# Patient Record
Sex: FEMALE | Race: ASIAN | NOT HISPANIC OR LATINO
[De-identification: names, ages, dates, MRNs, and addresses within clinical notes are randomized per-mention and may not be internally consistent; named-entity substitution may affect disease eponyms.]

---

## 2017-03-09 ENCOUNTER — APPOINTMENT (OUTPATIENT)
Dept: ELECTROPHYSIOLOGY | Facility: CLINIC | Age: 82
End: 2017-03-09

## 2017-09-21 ENCOUNTER — APPOINTMENT (OUTPATIENT)
Dept: ELECTROPHYSIOLOGY | Facility: CLINIC | Age: 82
End: 2017-09-21
Payer: MEDICAID

## 2017-09-21 PROCEDURE — 93280 PM DEVICE PROGR EVAL DUAL: CPT

## 2017-09-21 RX ORDER — LEVOTHYROXINE SODIUM 0.03 MG/1
25 TABLET ORAL
Refills: 0 | Status: ACTIVE | COMMUNITY

## 2017-09-21 RX ORDER — DOCUSATE SODIUM 100 MG/1
100 CAPSULE, LIQUID FILLED ORAL 3 TIMES DAILY
Refills: 0 | Status: ACTIVE | COMMUNITY

## 2018-02-16 ENCOUNTER — INPATIENT (INPATIENT)
Facility: HOSPITAL | Age: 83
LOS: 2 days | Discharge: HOME | End: 2018-02-19
Attending: HOSPITALIST | Admitting: HOSPITALIST
Payer: MEDICAID

## 2018-02-16 VITALS
OXYGEN SATURATION: 100 % | RESPIRATION RATE: 16 BRPM | SYSTOLIC BLOOD PRESSURE: 135 MMHG | DIASTOLIC BLOOD PRESSURE: 75 MMHG | TEMPERATURE: 98 F | HEART RATE: 82 BPM

## 2018-02-16 DIAGNOSIS — I10 ESSENTIAL (PRIMARY) HYPERTENSION: ICD-10-CM

## 2018-02-16 DIAGNOSIS — Z29.9 ENCOUNTER FOR PROPHYLACTIC MEASURES, UNSPECIFIED: ICD-10-CM

## 2018-02-16 DIAGNOSIS — J10.1 INFLUENZA DUE TO OTHER IDENTIFIED INFLUENZA VIRUS WITH OTHER RESPIRATORY MANIFESTATIONS: ICD-10-CM

## 2018-02-16 DIAGNOSIS — J45.901 UNSPECIFIED ASTHMA WITH (ACUTE) EXACERBATION: ICD-10-CM

## 2018-02-16 DIAGNOSIS — Z95.0 PRESENCE OF CARDIAC PACEMAKER: ICD-10-CM

## 2018-02-16 DIAGNOSIS — S72.91XA UNSPECIFIED FRACTURE OF RIGHT FEMUR, INITIAL ENCOUNTER FOR CLOSED FRACTURE: Chronic | ICD-10-CM

## 2018-02-16 DIAGNOSIS — E87.1 HYPO-OSMOLALITY AND HYPONATREMIA: ICD-10-CM

## 2018-02-16 DIAGNOSIS — E03.9 HYPOTHYROIDISM, UNSPECIFIED: ICD-10-CM

## 2018-02-16 LAB
ALBUMIN SERPL ELPH-MCNC: 3.7 G/DL — SIGNIFICANT CHANGE UP (ref 3.3–5)
ALP SERPL-CCNC: 89 U/L — SIGNIFICANT CHANGE UP (ref 40–120)
ALT FLD-CCNC: 16 U/L — SIGNIFICANT CHANGE UP (ref 4–33)
APPEARANCE UR: CLEAR — SIGNIFICANT CHANGE UP
AST SERPL-CCNC: 36 U/L — HIGH (ref 4–32)
B PERT DNA SPEC QL NAA+PROBE: SIGNIFICANT CHANGE UP
BASE EXCESS BLDV CALC-SCNC: 0.2 MMOL/L — SIGNIFICANT CHANGE UP
BASOPHILS # BLD AUTO: 0.04 K/UL — SIGNIFICANT CHANGE UP (ref 0–0.2)
BASOPHILS NFR BLD AUTO: 0.7 % — SIGNIFICANT CHANGE UP (ref 0–2)
BILIRUB SERPL-MCNC: 0.2 MG/DL — SIGNIFICANT CHANGE UP (ref 0.2–1.2)
BILIRUB UR-MCNC: NEGATIVE — SIGNIFICANT CHANGE UP
BLOOD GAS VENOUS - CREATININE: 0.86 MG/DL — SIGNIFICANT CHANGE UP (ref 0.5–1.3)
BLOOD UR QL VISUAL: HIGH
BUN SERPL-MCNC: 12 MG/DL — SIGNIFICANT CHANGE UP (ref 7–23)
BUN SERPL-MCNC: 13 MG/DL — SIGNIFICANT CHANGE UP (ref 7–23)
C PNEUM DNA SPEC QL NAA+PROBE: NOT DETECTED — SIGNIFICANT CHANGE UP
CALCIUM SERPL-MCNC: 8.1 MG/DL — LOW (ref 8.4–10.5)
CALCIUM SERPL-MCNC: 8.6 MG/DL — SIGNIFICANT CHANGE UP (ref 8.4–10.5)
CHLORIDE BLDV-SCNC: 98 MMOL/L — SIGNIFICANT CHANGE UP (ref 96–108)
CHLORIDE SERPL-SCNC: 92 MMOL/L — LOW (ref 98–107)
CHLORIDE SERPL-SCNC: 92 MMOL/L — LOW (ref 98–107)
CK MB BLD-MCNC: 1.8 NG/ML — SIGNIFICANT CHANGE UP (ref 1–4.7)
CK MB BLD-MCNC: SIGNIFICANT CHANGE UP (ref 0–2.5)
CK SERPL-CCNC: 110 U/L — SIGNIFICANT CHANGE UP (ref 25–170)
CO2 SERPL-SCNC: 21 MMOL/L — LOW (ref 22–31)
CO2 SERPL-SCNC: 22 MMOL/L — SIGNIFICANT CHANGE UP (ref 22–31)
COLOR SPEC: YELLOW — SIGNIFICANT CHANGE UP
CREAT SERPL-MCNC: 0.98 MG/DL — SIGNIFICANT CHANGE UP (ref 0.5–1.3)
CREAT SERPL-MCNC: 0.99 MG/DL — SIGNIFICANT CHANGE UP (ref 0.5–1.3)
EOSINOPHIL # BLD AUTO: 0.01 K/UL — SIGNIFICANT CHANGE UP (ref 0–0.5)
EOSINOPHIL NFR BLD AUTO: 0.2 % — SIGNIFICANT CHANGE UP (ref 0–6)
FLUAV H1 2009 PAND RNA SPEC QL NAA+PROBE: POSITIVE — HIGH
FLUAV H1 RNA SPEC QL NAA+PROBE: NOT DETECTED — SIGNIFICANT CHANGE UP
FLUAV H3 RNA SPEC QL NAA+PROBE: NOT DETECTED — SIGNIFICANT CHANGE UP
FLUBV RNA SPEC QL NAA+PROBE: NOT DETECTED — SIGNIFICANT CHANGE UP
GAS PNL BLDV: 122 MMOL/L — LOW (ref 136–146)
GLUCOSE BLDV-MCNC: 100 — HIGH (ref 70–99)
GLUCOSE SERPL-MCNC: 96 MG/DL — SIGNIFICANT CHANGE UP (ref 70–99)
GLUCOSE SERPL-MCNC: 96 MG/DL — SIGNIFICANT CHANGE UP (ref 70–99)
GLUCOSE UR-MCNC: NEGATIVE — SIGNIFICANT CHANGE UP
HADV DNA SPEC QL NAA+PROBE: NOT DETECTED — SIGNIFICANT CHANGE UP
HCO3 BLDV-SCNC: 23 MMOL/L — SIGNIFICANT CHANGE UP (ref 20–27)
HCOV 229E RNA SPEC QL NAA+PROBE: NOT DETECTED — SIGNIFICANT CHANGE UP
HCOV HKU1 RNA SPEC QL NAA+PROBE: NOT DETECTED — SIGNIFICANT CHANGE UP
HCOV NL63 RNA SPEC QL NAA+PROBE: NOT DETECTED — SIGNIFICANT CHANGE UP
HCOV OC43 RNA SPEC QL NAA+PROBE: NOT DETECTED — SIGNIFICANT CHANGE UP
HCT VFR BLD CALC: 39 % — SIGNIFICANT CHANGE UP (ref 34.5–45)
HCT VFR BLDV CALC: 41.3 % — SIGNIFICANT CHANGE UP (ref 34.5–45)
HGB BLD-MCNC: 12.6 G/DL — SIGNIFICANT CHANGE UP (ref 11.5–15.5)
HGB BLDV-MCNC: 13.5 G/DL — SIGNIFICANT CHANGE UP (ref 11.5–15.5)
HMPV RNA SPEC QL NAA+PROBE: NOT DETECTED — SIGNIFICANT CHANGE UP
HPIV1 RNA SPEC QL NAA+PROBE: NOT DETECTED — SIGNIFICANT CHANGE UP
HPIV2 RNA SPEC QL NAA+PROBE: NOT DETECTED — SIGNIFICANT CHANGE UP
HPIV3 RNA SPEC QL NAA+PROBE: NOT DETECTED — SIGNIFICANT CHANGE UP
HPIV4 RNA SPEC QL NAA+PROBE: NOT DETECTED — SIGNIFICANT CHANGE UP
IMM GRANULOCYTES # BLD AUTO: 0.02 # — SIGNIFICANT CHANGE UP
IMM GRANULOCYTES NFR BLD AUTO: 0.4 % — SIGNIFICANT CHANGE UP (ref 0–1.5)
KETONES UR-MCNC: NEGATIVE — SIGNIFICANT CHANGE UP
LACTATE BLDV-MCNC: 1.6 MMOL/L — SIGNIFICANT CHANGE UP (ref 0.5–2)
LEUKOCYTE ESTERASE UR-ACNC: NEGATIVE — SIGNIFICANT CHANGE UP
LIDOCAIN IGE QN: 39.3 U/L — SIGNIFICANT CHANGE UP (ref 7–60)
LYMPHOCYTES # BLD AUTO: 1.91 K/UL — SIGNIFICANT CHANGE UP (ref 1–3.3)
LYMPHOCYTES # BLD AUTO: 33.7 % — SIGNIFICANT CHANGE UP (ref 13–44)
M PNEUMO DNA SPEC QL NAA+PROBE: NOT DETECTED — SIGNIFICANT CHANGE UP
MCHC RBC-ENTMCNC: 27.8 PG — SIGNIFICANT CHANGE UP (ref 27–34)
MCHC RBC-ENTMCNC: 32.3 % — SIGNIFICANT CHANGE UP (ref 32–36)
MCV RBC AUTO: 85.9 FL — SIGNIFICANT CHANGE UP (ref 80–100)
MONOCYTES # BLD AUTO: 0.83 K/UL — SIGNIFICANT CHANGE UP (ref 0–0.9)
MONOCYTES NFR BLD AUTO: 14.6 % — HIGH (ref 2–14)
MUCOUS THREADS # UR AUTO: SIGNIFICANT CHANGE UP
NEUTROPHILS # BLD AUTO: 2.86 K/UL — SIGNIFICANT CHANGE UP (ref 1.8–7.4)
NEUTROPHILS NFR BLD AUTO: 50.4 % — SIGNIFICANT CHANGE UP (ref 43–77)
NITRITE UR-MCNC: NEGATIVE — SIGNIFICANT CHANGE UP
NON-SQ EPI CELLS # UR AUTO: <1 — SIGNIFICANT CHANGE UP
NRBC # FLD: 0 — SIGNIFICANT CHANGE UP
OSMOLALITY SERPL: 271 MOSMO/KG — LOW (ref 275–295)
PCO2 BLDV: 45 MMHG — SIGNIFICANT CHANGE UP (ref 41–51)
PH BLDV: 7.36 PH — SIGNIFICANT CHANGE UP (ref 7.32–7.43)
PH UR: 6 — SIGNIFICANT CHANGE UP (ref 4.6–8)
PLATELET # BLD AUTO: 187 K/UL — SIGNIFICANT CHANGE UP (ref 150–400)
PMV BLD: 9.3 FL — SIGNIFICANT CHANGE UP (ref 7–13)
PO2 BLDV: 28 MMHG — LOW (ref 35–40)
POTASSIUM BLDV-SCNC: SIGNIFICANT CHANGE UP MMOL/L (ref 3.4–4.5)
POTASSIUM SERPL-MCNC: 4.3 MMOL/L — SIGNIFICANT CHANGE UP (ref 3.5–5.3)
POTASSIUM SERPL-MCNC: 5.4 MMOL/L — HIGH (ref 3.5–5.3)
POTASSIUM SERPL-SCNC: 4.3 MMOL/L — SIGNIFICANT CHANGE UP (ref 3.5–5.3)
POTASSIUM SERPL-SCNC: 5.4 MMOL/L — HIGH (ref 3.5–5.3)
PROT SERPL-MCNC: 7.7 G/DL — SIGNIFICANT CHANGE UP (ref 6–8.3)
PROT UR-MCNC: 30 MG/DL — HIGH
RBC # BLD: 4.54 M/UL — SIGNIFICANT CHANGE UP (ref 3.8–5.2)
RBC # FLD: 13.5 % — SIGNIFICANT CHANGE UP (ref 10.3–14.5)
RBC CASTS # UR COMP ASSIST: SIGNIFICANT CHANGE UP (ref 0–?)
RSV RNA SPEC QL NAA+PROBE: NOT DETECTED — SIGNIFICANT CHANGE UP
RV+EV RNA SPEC QL NAA+PROBE: NOT DETECTED — SIGNIFICANT CHANGE UP
SAO2 % BLDV: 55.4 % — LOW (ref 60–85)
SODIUM SERPL-SCNC: 127 MMOL/L — LOW (ref 135–145)
SODIUM SERPL-SCNC: 128 MMOL/L — LOW (ref 135–145)
SP GR SPEC: 1.02 — SIGNIFICANT CHANGE UP (ref 1–1.04)
SQUAMOUS # UR AUTO: SIGNIFICANT CHANGE UP
TROPONIN T SERPL-MCNC: < 0.06 NG/ML — SIGNIFICANT CHANGE UP (ref 0–0.06)
UROBILINOGEN FLD QL: NORMAL MG/DL — SIGNIFICANT CHANGE UP
WBC # BLD: 5.67 K/UL — SIGNIFICANT CHANGE UP (ref 3.8–10.5)
WBC # FLD AUTO: 5.67 K/UL — SIGNIFICANT CHANGE UP (ref 3.8–10.5)
WBC UR QL: SIGNIFICANT CHANGE UP (ref 0–?)

## 2018-02-16 PROCEDURE — 71045 X-RAY EXAM CHEST 1 VIEW: CPT | Mod: 26

## 2018-02-16 PROCEDURE — 99223 1ST HOSP IP/OBS HIGH 75: CPT

## 2018-02-16 RX ORDER — TIOTROPIUM BROMIDE 18 UG/1
1 CAPSULE ORAL; RESPIRATORY (INHALATION) DAILY
Qty: 0 | Refills: 0 | Status: DISCONTINUED | OUTPATIENT
Start: 2018-02-16 | End: 2018-02-19

## 2018-02-16 RX ORDER — IPRATROPIUM/ALBUTEROL SULFATE 18-103MCG
3 AEROSOL WITH ADAPTER (GRAM) INHALATION ONCE
Qty: 0 | Refills: 0 | Status: COMPLETED | OUTPATIENT
Start: 2018-02-16 | End: 2018-02-16

## 2018-02-16 RX ORDER — LEVOTHYROXINE SODIUM 125 MCG
25 TABLET ORAL DAILY
Qty: 0 | Refills: 0 | Status: DISCONTINUED | OUTPATIENT
Start: 2018-02-16 | End: 2018-02-19

## 2018-02-16 RX ORDER — IPRATROPIUM/ALBUTEROL SULFATE 18-103MCG
3 AEROSOL WITH ADAPTER (GRAM) INHALATION EVERY 6 HOURS
Qty: 0 | Refills: 0 | Status: DISCONTINUED | OUTPATIENT
Start: 2018-02-16 | End: 2018-02-19

## 2018-02-16 RX ORDER — ALBUTEROL 90 UG/1
1 AEROSOL, METERED ORAL EVERY 4 HOURS
Qty: 0 | Refills: 0 | Status: DISCONTINUED | OUTPATIENT
Start: 2018-02-16 | End: 2018-02-19

## 2018-02-16 RX ORDER — BUDESONIDE AND FORMOTEROL FUMARATE DIHYDRATE 160; 4.5 UG/1; UG/1
2 AEROSOL RESPIRATORY (INHALATION)
Qty: 0 | Refills: 0 | Status: DISCONTINUED | OUTPATIENT
Start: 2018-02-16 | End: 2018-02-19

## 2018-02-16 RX ORDER — SODIUM CHLORIDE 9 MG/ML
500 INJECTION INTRAMUSCULAR; INTRAVENOUS; SUBCUTANEOUS ONCE
Qty: 0 | Refills: 0 | Status: COMPLETED | OUTPATIENT
Start: 2018-02-16 | End: 2018-02-16

## 2018-02-16 RX ORDER — HEPARIN SODIUM 5000 [USP'U]/ML
5000 INJECTION INTRAVENOUS; SUBCUTANEOUS EVERY 8 HOURS
Qty: 0 | Refills: 0 | Status: DISCONTINUED | OUTPATIENT
Start: 2018-02-16 | End: 2018-02-19

## 2018-02-16 RX ORDER — FLUTICASONE PROPIONATE 50 MCG
1 SPRAY, SUSPENSION NASAL DAILY
Qty: 0 | Refills: 0 | Status: DISCONTINUED | OUTPATIENT
Start: 2018-02-16 | End: 2018-02-19

## 2018-02-16 RX ORDER — AMLODIPINE BESYLATE 2.5 MG/1
5 TABLET ORAL DAILY
Qty: 0 | Refills: 0 | Status: DISCONTINUED | OUTPATIENT
Start: 2018-02-16 | End: 2018-02-19

## 2018-02-16 RX ORDER — METOPROLOL TARTRATE 50 MG
50 TABLET ORAL DAILY
Qty: 0 | Refills: 0 | Status: DISCONTINUED | OUTPATIENT
Start: 2018-02-16 | End: 2018-02-19

## 2018-02-16 RX ADMIN — Medication 3 MILLILITER(S): at 19:06

## 2018-02-16 RX ADMIN — Medication 3 MILLILITER(S): at 18:48

## 2018-02-16 RX ADMIN — SODIUM CHLORIDE 500 MILLILITER(S): 9 INJECTION INTRAMUSCULAR; INTRAVENOUS; SUBCUTANEOUS at 18:48

## 2018-02-16 RX ADMIN — HEPARIN SODIUM 5000 UNIT(S): 5000 INJECTION INTRAVENOUS; SUBCUTANEOUS at 23:21

## 2018-02-16 RX ADMIN — Medication 50 MILLIGRAM(S): at 18:48

## 2018-02-16 RX ADMIN — Medication 5 MILLIGRAM(S): at 23:21

## 2018-02-16 NOTE — H&P ADULT - NSHPPHYSICALEXAM_GEN_ALL_CORE
Vital Signs Last 24 Hrs  T(C): 36.8 (16 Feb 2018 20:06), Max: 37.5 (16 Feb 2018 18:48)  T(F): 98.2 (16 Feb 2018 20:06), Max: 99.5 (16 Feb 2018 18:48)  HR: 90 (16 Feb 2018 20:06) (73 - 90)  BP: 151/80 (16 Feb 2018 20:06) (135/75 - 188/88)  BP(mean): --  RR: 18 (16 Feb 2018 20:06) (16 - 24)  SpO2: 96% (16 Feb 2018 20:06) (96% - 100%)    GENERAL: No acute distress, well-developed  HEAD:  Atraumatic, Normocephalic  ENT: EOMI, PERRLA, conjunctiva and sclera clear, Neck supple, No JVD, moist mucosa  CHEST/LUNG: Clear to auscultation bilaterally; No wheeze, equal breath sounds bilaterally   BACK: No spinal tenderness  HEART: Regular rate and rhythm; No murmurs, rubs, or gallops  ABDOMEN: Soft, Nontender, Nondistended; Bowel sounds present  EXTREMITIES:  No clubbing, cyanosis, or edema  PSYCH: Nl behavior, nl affect  NEUROLOGY: AAOx3, non-focal  SKIN: Normal color, No rashes or lesions

## 2018-02-16 NOTE — H&P ADULT - PROBLEM SELECTOR PLAN 1
- Given acute onset of her symptoms, would start her on tamiflu and observe response  - c/w supportive care as needed - Given acute onset of her symptoms, would start her on tamiflu and observe response (tamiflu 30mg BID dosed for creatinine clearance of 37)  - c/w supportive care as needed

## 2018-02-16 NOTE — ED PROVIDER NOTE - MEDICAL DECISION MAKING DETAILS
Pt is an 86 y/o F nonsmoker PMHx Asthma, HTN, Pacemaker ([employer]) p/w cough, wheezing, chills, n/v x 2 days -- pneumonia vs. viral syndrome, vs. asthma exacerbation; alarming pacemaker; no risk factors for PE, abdomen nontender; only abdominal pain with coughing, not likely emergent intra-abdominal pathology -- labs, lipase, ua, ucx, cxr, nebs, iv fluids, EP consult, possible admission

## 2018-02-16 NOTE — H&P ADULT - HISTORY OF PRESENT ILLNESS
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 As per the daughter, the patient started to initially have a severe cough last night with production of some whitish sputum, said that the patient then started to have shortness of breath that was minimally improved with her inhalers. This morning the patient was still having significant SOB and the daughter therefore brought the patient to the hospital for evaluation. The patient denies any nasal congestion, sore throat, or myalgias. Said that she is having some n/v (threw up her dinner last night) but otherwise denies any other GI complaints. The daughter said that she had noted the patient's PPM would beep when ever she had a coughing fit but that has since resolved. No f/c at home, no known sick contacts and no recent travel. No other complaints.     In the ED, her vitals were T 98.4, P 82, /75, R 16, O2 sat 100% RA. Lab work was significant for mild hyponatremia and RVP positive for influenza AH3. Her CXR did not show any acute abnormalities. She was given NS 500cc x1, duonebs x2, and prednisone 50mg PO x1. She was admitted to medicine. 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 As per the daughter, the patient started to initially have a severe cough last night with production of some whitish sputum, said that the patient then started to have shortness of breath that was minimally improved with her inhalers. This morning the patient was still having significant SOB and the daughter therefore brought the patient to the hospital for evaluation. The patient denies any nasal congestion, sore throat, or myalgias. Said that she is having some n/v (threw up her dinner last night) but otherwise denies any other GI complaints. Pts daughter does state that her appetite has been poor since her coughing start and she has not taken any PO intake since last night. The daughter said that she had noted the patient's PPM would beep when ever she had a coughing fit but that has since resolved. No f/c at home, no known sick contacts and no recent travel. No other complaints.     In the ED, her vitals were T 98.4, P 82, /75, R 16, O2 sat 100% RA. Lab work was significant for mild hyponatremia and RVP positive for influenza AH3. Her CXR did not show any acute abnormalities. She was given NS 500cc x1, duonebs x2, and prednisone 50mg PO x1. She was admitted to medicine. 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 As per the daughter, the patient started to initially have a severe cough last night with production of some whitish sputum, said that the patient then started to have shortness of breath that was minimally improved with her inhalers. This morning the patient was still having significant SOB and the daughter therefore brought the patient to the hospital for evaluation. The patient denies any nasal congestion, sore throat, or myalgias. Said that she is having some n/v (threw up her dinner last night) but otherwise denies any other GI complaints. Pts daughter does state that her appetite has been poor since her coughing start and she has not taken any PO intake since last night. The daughter said that she had noted the patient's PPM would beep when ever she had a coughing fit but that has since resolved. No f/c at home, no known sick contacts and no recent travel. No other complaints.     In the ED, her vitals were T 98.4, P 82, /75, R 16, O2 sat 100% RA. Lab work was significant for mild hyponatremia and RVP positive for influenza AH3. Her CXR did not show any acute abnormalities. She was given NS 500cc x1, duonebs x2, and prednisone 50mg PO x1. She was  seen by EP in the ED and her PPM was interrogated. She was admitted to medicine.

## 2018-02-16 NOTE — H&P ADULT - ASSESSMENT
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 Also with likely asthma exacerbation.

## 2018-02-16 NOTE — H&P ADULT - PROBLEM SELECTOR PROBLEM 5
Pt resting on cart. No distress noted. No requests at this time. Pt updated on plan. Hypothyroidism, unspecified type Essential hypertension

## 2018-02-16 NOTE — H&P ADULT - NSHPREVIEWOFSYSTEMS_GEN_ALL_CORE
REVIEW OF SYSTEMS:    CONSTITUTIONAL: No fevers or chills; +generalized weakness  EYES/ENT: No visual changes;  No dysphagia  NECK: No pain or stiffness  RESPIRATORY: +Cough with some white sputum, +SOB  CARDIOVASCULAR: No chest pain or palpitations; No lower extremity edema; +Beeping of PPM  GASTROINTESTINAL: +N/V; No abdominal or epigastric pain. No diarrhea or constipation. No melena or hematochezia.  BACK: No back pain  GENITOURINARY: No dysuria, frequency or hematuria  NEUROLOGICAL: No numbness or weakness  SKIN: No itching, burning, rashes, or lesions   All other review of systems is negative unless indicated above.

## 2018-02-16 NOTE — ED ADULT TRIAGE NOTE - CHIEF COMPLAINT QUOTE
Pt. c/o cough, wheezing, sob, and abdominal pain x 2 days. 1 episode of vomiting today. Denies diarrhea or fever. pmhx asthma, pacemaker, HTN       pmhx asthma, pacemaker

## 2018-02-16 NOTE — ED PROVIDER NOTE - ATTENDING CONTRIBUTION TO CARE
87f, pmhx asthma, htn, ppm, family translating per pt request. p/w 2 days of cough, sob and wheezing. no f/c, +n/v and +dysuria, no sore throat, body aches, h/a or rhinorrhea. no change in stools. has epigastric pain only with cough, but not when not coughing. exam, vs wnl, mildly tachypneic and wheezing, no resp distress. abdo benign. HEENT exam normal. will do rectal temp, nebs, steroids, rvp labs, cxr. likely admit.

## 2018-02-16 NOTE — H&P ADULT - NSHPSOCIALHISTORY_GEN_ALL_CORE
Lives with her daughter and daughter's family  Ambulates with cane/walker but only room to room  Denies any tobacco, EtOH, and illicit drug use

## 2018-02-16 NOTE — ED ADULT NURSE REASSESSMENT NOTE - NS ED NURSE REASSESS COMMENT FT1
pt aox3; resting comfortable in no acute distress. Family at bedside; safety maintained. Pt given mask. Will continue to monitor/assess

## 2018-02-16 NOTE — ED PROVIDER NOTE - OBJECTIVE STATEMENT
Pt is an 88 y/o F nonsmoker PMHx Asthma, HTN, Pacemaker ([employer]) p/w cough, wheezing, chills, n/v x 2 days.  Pt speaks Demario; translation and history provided by pt's daughter and grandson; pacific  phone declined.  Pt states she has had gradual onset nonproductive cough for past two days associated with wheezing and chills.  Wheezing mildly improved with inhaler.  Pt's grandson states pt felt very hot yesterday.  Pt also had one episode of nausea and NBNB vomitus.  Pt states she has bilateral lower abdominal pain which occurs only when she coughs.  Pt states she has some burning dysuria.  Pt denies any chest pain, palpitations, diarrhea, constipation, melena, brbpr, hemoptysis, recent travel history, recent hospitalizations.  Pt's daughter states that yesterday pt's pacemaker was beeping, which has resolved today.  Today, pt's daughter and grandson states pt appears more tired and lethargic than usual.  No head trauma, headaches, or use of blood thinners.

## 2018-02-16 NOTE — H&P ADULT - PROBLEM SELECTOR PLAN 2
- Currently with clear lungs and good respiratory effort  - Will place on prednisone 40 daily and duonebs  - c/w home advair

## 2018-02-16 NOTE — ED ADULT NURSE NOTE - OBJECTIVE STATEMENT
Alert and oriented x 4. Pt speaks Demario. Grandson and niece translated. Pt states: " I have cough and belly pain." As per family she has cough and belly pain for 2 days. She has lower abdominal pain with nausea and vomiting yesterday and her pacemaker went off yesterday more then twice. Pt pain to belly is 5/10, non radiating. Pt denies chest pain, dizziness, diarrhea or painful urination. IV 20g to right AC. Labs drawn. VSS. Family at bedside. Will continue to monitor. RN Facilitator.

## 2018-02-16 NOTE — H&P ADULT - PROBLEM SELECTOR PLAN 3
- Possibly 2/2 poor PO intake given her acute illness, s/p  cc in ED, will encourage PO intake  - Will check urine sodium, urine osm

## 2018-02-16 NOTE — CHART NOTE - NSCHARTNOTEFT_GEN_A_CORE
Division of Electrophysiology  Device Interrogation Report    Interrogation of: [x] Pacemaker [ ] Defibrillator    Indication for Interrogation: beeping noise    Report: normal function    : [ ] [employer] [x] [place] [ ] [employer]    Model: Pa [id number]  Dual chamber    Battery Status: 9.2 y    Lead:               Amplitude (Sense)     Threshold      Impedance   Atrial:                 2.1                           0.25 @ 0.4       430  RV:                     5.6                           0.5 @ 0.4        450  LV:    Comments / Events: normal function    Changes Made: none

## 2018-02-16 NOTE — H&P ADULT - PROBLEM SELECTOR PLAN 4
- c/w home meds with hold parameters - Pt complaining fo PPM beeping while coughing, seen by EP and PPM interrogated, normal functioning noted with no events

## 2018-02-16 NOTE — ED PROVIDER NOTE - NONTENDER LOCATION
left lower quadrant/umbilical/right costovertebral angle/suprapubic/periumbilical/left costovertebral angle/left upper quadrant/right upper quadrant/right lower quadrant

## 2018-02-17 PROBLEM — Z95.0 PRESENCE OF CARDIAC PACEMAKER: Chronic | Status: ACTIVE | Noted: 2018-02-16

## 2018-02-17 LAB
BUN SERPL-MCNC: 12 MG/DL — SIGNIFICANT CHANGE UP (ref 7–23)
CALCIUM SERPL-MCNC: 8.4 MG/DL — SIGNIFICANT CHANGE UP (ref 8.4–10.5)
CHLORIDE SERPL-SCNC: 94 MMOL/L — LOW (ref 98–107)
CO2 SERPL-SCNC: 20 MMOL/L — LOW (ref 22–31)
CREAT SERPL-MCNC: 0.83 MG/DL — SIGNIFICANT CHANGE UP (ref 0.5–1.3)
GLUCOSE SERPL-MCNC: 148 MG/DL — HIGH (ref 70–99)
HCT VFR BLD CALC: 35.4 % — SIGNIFICANT CHANGE UP (ref 34.5–45)
HGB BLD-MCNC: 11.8 G/DL — SIGNIFICANT CHANGE UP (ref 11.5–15.5)
MAGNESIUM SERPL-MCNC: 1.7 MG/DL — SIGNIFICANT CHANGE UP (ref 1.6–2.6)
MCHC RBC-ENTMCNC: 28 PG — SIGNIFICANT CHANGE UP (ref 27–34)
MCHC RBC-ENTMCNC: 33.3 % — SIGNIFICANT CHANGE UP (ref 32–36)
MCV RBC AUTO: 84.1 FL — SIGNIFICANT CHANGE UP (ref 80–100)
NRBC # FLD: 0 — SIGNIFICANT CHANGE UP
OSMOLALITY UR: 287 MOSMO/KG — SIGNIFICANT CHANGE UP (ref 50–1200)
PHOSPHATE SERPL-MCNC: 3.5 MG/DL — SIGNIFICANT CHANGE UP (ref 2.5–4.5)
PLATELET # BLD AUTO: 214 K/UL — SIGNIFICANT CHANGE UP (ref 150–400)
PMV BLD: 9.6 FL — SIGNIFICANT CHANGE UP (ref 7–13)
POTASSIUM SERPL-MCNC: 4.5 MMOL/L — SIGNIFICANT CHANGE UP (ref 3.5–5.3)
POTASSIUM SERPL-SCNC: 4.5 MMOL/L — SIGNIFICANT CHANGE UP (ref 3.5–5.3)
RBC # BLD: 4.21 M/UL — SIGNIFICANT CHANGE UP (ref 3.8–5.2)
RBC # FLD: 13.4 % — SIGNIFICANT CHANGE UP (ref 10.3–14.5)
SODIUM SERPL-SCNC: 130 MMOL/L — LOW (ref 135–145)
SODIUM UR-SCNC: 55 MMOL/L — SIGNIFICANT CHANGE UP
SPECIMEN SOURCE: SIGNIFICANT CHANGE UP
SPECIMEN SOURCE: SIGNIFICANT CHANGE UP
WBC # BLD: 4.01 K/UL — SIGNIFICANT CHANGE UP (ref 3.8–10.5)
WBC # FLD AUTO: 4.01 K/UL — SIGNIFICANT CHANGE UP (ref 3.8–10.5)

## 2018-02-17 PROCEDURE — 99233 SBSQ HOSP IP/OBS HIGH 50: CPT | Mod: GC

## 2018-02-17 RX ADMIN — HEPARIN SODIUM 5000 UNIT(S): 5000 INJECTION INTRAVENOUS; SUBCUTANEOUS at 14:49

## 2018-02-17 RX ADMIN — AMLODIPINE BESYLATE 5 MILLIGRAM(S): 2.5 TABLET ORAL at 05:50

## 2018-02-17 RX ADMIN — Medication 3 MILLILITER(S): at 03:20

## 2018-02-17 RX ADMIN — Medication 3 MILLILITER(S): at 16:31

## 2018-02-17 RX ADMIN — BUDESONIDE AND FORMOTEROL FUMARATE DIHYDRATE 2 PUFF(S): 160; 4.5 AEROSOL RESPIRATORY (INHALATION) at 22:41

## 2018-02-17 RX ADMIN — Medication 40 MILLIGRAM(S): at 05:51

## 2018-02-17 RX ADMIN — Medication 1 SPRAY(S): at 14:49

## 2018-02-17 RX ADMIN — Medication 30 MILLIGRAM(S): at 05:50

## 2018-02-17 RX ADMIN — BUDESONIDE AND FORMOTEROL FUMARATE DIHYDRATE 2 PUFF(S): 160; 4.5 AEROSOL RESPIRATORY (INHALATION) at 09:41

## 2018-02-17 RX ADMIN — HEPARIN SODIUM 5000 UNIT(S): 5000 INJECTION INTRAVENOUS; SUBCUTANEOUS at 05:51

## 2018-02-17 RX ADMIN — Medication 5 MILLIGRAM(S): at 22:40

## 2018-02-17 RX ADMIN — HEPARIN SODIUM 5000 UNIT(S): 5000 INJECTION INTRAVENOUS; SUBCUTANEOUS at 22:40

## 2018-02-17 RX ADMIN — Medication 25 MICROGRAM(S): at 05:51

## 2018-02-17 RX ADMIN — Medication 50 MILLIGRAM(S): at 05:51

## 2018-02-17 RX ADMIN — Medication 3 MILLILITER(S): at 10:33

## 2018-02-17 RX ADMIN — Medication 30 MILLIGRAM(S): at 18:25

## 2018-02-17 RX ADMIN — Medication 3 MILLILITER(S): at 21:45

## 2018-02-17 NOTE — PROGRESS NOTE ADULT - PROBLEM SELECTOR PLAN 3
- Possibly 2/2 poor PO intake given her acute illness, s/p  cc in ED, will encourage PO intake  - Patient improved without further IVF. 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

## 2018-02-17 NOTE — PROGRESS NOTE ADULT - SUBJECTIVE AND OBJECTIVE BOX
Patient is a 87y old  Female who presents with a chief complaint of SOB, chills, cough (2018 23:24)      SUBJECTIVE / OVERNIGHT EVENTS:  Patient seen and examined at bedside. No acute events overnight. Vern translated for patient, refused  phone. They report they don't know if he has changed. No other fevers, chills, SOB. They heard wheezing previously, sounds better now.     MEDICATIONS  (STANDING):  ALBUTerol    90 MICROgram(s) HFA Inhaler 1 Puff(s) Inhalation every 4 hours  ALBUTerol/ipratropium for Nebulization 3 milliLiter(s) Nebulizer every 6 hours  amLODIPine   Tablet 5 milliGRAM(s) Oral daily  bisacodyl 5 milliGRAM(s) Oral at bedtime  buDESOnide 160 MICROgram(s)/formoterol 4.5 MICROgram(s) Inhaler 2 Puff(s) Inhalation two times a day  fluticasone propionate 50 MICROgram(s)/spray Nasal Spray 1 Spray(s) Both Nostrils daily  heparin  Injectable 5000 Unit(s) SubCutaneous every 8 hours  levothyroxine 25 MICROGram(s) Oral daily  metoprolol succinate ER 50 milliGRAM(s) Oral daily  oseltamivir 30 milliGRAM(s) Oral two times a day  predniSONE   Tablet 40 milliGRAM(s) Oral daily  tiotropium 18 MICROgram(s) Capsule 1 Capsule(s) Inhalation daily    MEDICATIONS  (PRN):        CAPILLARY BLOOD GLUCOSE        I&O's Summary    Vital Signs Last 24 Hrs  T(C): 36.4 (2018 05:49), Max: 37.5 (2018 18:48)  T(F): 97.5 (2018 05:49), Max: 99.5 (2018 18:48)  HR: 84 (2018 10:33) (69 - 90)  BP: 149/90 (2018 05:49) (135/75 - 188/88)  BP(mean): --  RR: 18 (2018 05:49) (16 - 24)  SpO2: 97% (2018 10:33) (95% - 100%)    PHYSICAL EXAM:  GENERAL: NAD, well-developed  HEAD:  Atraumatic, Normocephalic  EYES: EOMI,  conjunctiva and sclera clear  NECK: Supple  CHEST/LUNG: Good air movement, minimal air wheezing  HEART: Regular rate and rhythm; No murmurs, rubs, or gallops  ABDOMEN: Soft, Nontender, Nondistended; Bowel sounds present  EXTREMITIES:  No clubbing, cyanosis, or edema  PSYCH: AAOx3  NEUROLOGY: non-focal  SKIN: No rashes or lesions    LABS:                        11.8   4.01  )-----------( 214      ( 2018 05:17 )             35.4     WBC Trend: 4.01<--, 5.67<--  02-17    130<L>  |  94<L>  |  12  ----------------------------<  148<H>  4.5   |  20<L>  |  0.83    Ca    8.4      2018 05:17  Phos  3.5       Mg     1.7         TPro  7.7  /  Alb  3.7  /  TBili  0.2  /  DBili  x   /  AST  36<H>  /  ALT  16  /  AlkPhos  89  02-16    Creatinine Trend: 0.83<--, 0.99<--, 0.98<--    CARDIAC MARKERS ( 2018 17:54 )  x     / < 0.06 ng/mL / 110 u/L / 1.80 ng/mL / x          Urinalysis Basic - ( 2018 18:30 )    Color: YELLOW / Appearance: CLEAR / S.021 / pH: 6.0  Gluc: NEGATIVE / Ketone: NEGATIVE  / Bili: NEGATIVE / Urobili: NORMAL mg/dL   Blood: TRACE / Protein: 30 mg/dL / Nitrite: NEGATIVE   Leuk Esterase: NEGATIVE / RBC: 0-2 / WBC 2-5   Sq Epi: OCC / Non Sq Epi: x / Bacteria: x          RADIOLOGY & ADDITIONAL TESTS:    Imaging Personally Reviewed:    Consultant(s) Notes Reviewed:      Care Discussed with Consultants/Other Providers: Patient is a 87y old  Female who presents with a chief complaint of SOB, chills, cough (2018 23:24)      SUBJECTIVE / OVERNIGHT EVENTS:  Patient seen and examined at bedside. No acute events overnight. Vern translated for patient, refused  phone. They report they don't know if he has changed. No other fevers, chills, SOB. They heard wheezing previously, sounds better now.     MEDICATIONS  (STANDING):  ALBUTerol    90 MICROgram(s) HFA Inhaler 1 Puff(s) Inhalation every 4 hours  ALBUTerol/ipratropium for Nebulization 3 milliLiter(s) Nebulizer every 6 hours  amLODIPine   Tablet 5 milliGRAM(s) Oral daily  bisacodyl 5 milliGRAM(s) Oral at bedtime  buDESOnide 160 MICROgram(s)/formoterol 4.5 MICROgram(s) Inhaler 2 Puff(s) Inhalation two times a day  fluticasone propionate 50 MICROgram(s)/spray Nasal Spray 1 Spray(s) Both Nostrils daily  heparin  Injectable 5000 Unit(s) SubCutaneous every 8 hours  levothyroxine 25 MICROGram(s) Oral daily  metoprolol succinate ER 50 milliGRAM(s) Oral daily  oseltamivir 30 milliGRAM(s) Oral two times a day  predniSONE   Tablet 40 milliGRAM(s) Oral daily  tiotropium 18 MICROgram(s) Capsule 1 Capsule(s) Inhalation daily    MEDICATIONS  (PRN):        CAPILLARY BLOOD GLUCOSE        I&O's Summary    Vital Signs Last 24 Hrs  T(C): 36.4 (2018 05:49), Max: 37.5 (2018 18:48)  T(F): 97.5 (2018 05:49), Max: 99.5 (2018 18:48)  HR: 84 (2018 10:33) (69 - 90)  BP: 149/90 (2018 05:49) (135/75 - 188/88)  BP(mean): --  RR: 18 (2018 05:49) (16 - 24)  SpO2: 97% (2018 10:33) (95% - 100%)    PHYSICAL EXAM:  GENERAL: NAD, well-developed  HEAD:  Atraumatic, Normocephalic  EYES: EOMI,  conjunctiva and sclera clear  NECK: Supple  CHEST/LUNG: Good air movement, minimal air wheezing  HEART: Regular rate and rhythm; No murmurs, rubs, or gallops  ABDOMEN: Soft, Nontender, Nondistended; Bowel sounds present  EXTREMITIES:  No clubbing, cyanosis, or edema  PSYCH: AAOx3  NEUROLOGY: non-focal  SKIN: No rashes or lesions    LABS:                        11.8   4.01  )-----------( 214      ( 2018 05:17 )             35.4     WBC Trend: 4.01<--, 5.67<--  02-17    130<L>  |  94<L>  |  12  ----------------------------<  148<H>  4.5   |  20<L>  |  0.83    Ca    8.4      2018 05:17  Phos  3.5       Mg     1.7         TPro  7.7  /  Alb  3.7  /  TBili  0.2  /  DBili  x   /  AST  36<H>  /  ALT  16  /  AlkPhos  89  02-16    Creatinine Trend: 0.83<--, 0.99<--, 0.98<--    CARDIAC MARKERS ( 2018 17:54 )  x     / < 0.06 ng/mL / 110 u/L / 1.80 ng/mL / x          Urinalysis Basic - ( 2018 18:30 )    Color: YELLOW / Appearance: CLEAR / S.021 / pH: 6.0  Gluc: NEGATIVE / Ketone: NEGATIVE  / Bili: NEGATIVE / Urobili: NORMAL mg/dL   Blood: TRACE / Protein: 30 mg/dL / Nitrite: NEGATIVE   Leuk Esterase: NEGATIVE / RBC: 0-2 / WBC 2-5   Sq Epi: OCC / Non Sq Epi: x / Bacteria: x          RADIOLOGY & ADDITIONAL TESTS:    Imaging Personally Reviewed: CXR read by me clear lungs

## 2018-02-18 ENCOUNTER — TRANSCRIPTION ENCOUNTER (OUTPATIENT)
Age: 83
End: 2018-02-18

## 2018-02-18 LAB
BACTERIA UR CULT: SIGNIFICANT CHANGE UP
BASOPHILS # BLD AUTO: 0.02 K/UL — SIGNIFICANT CHANGE UP (ref 0–0.2)
BASOPHILS NFR BLD AUTO: 0.3 % — SIGNIFICANT CHANGE UP (ref 0–2)
BUN SERPL-MCNC: 19 MG/DL — SIGNIFICANT CHANGE UP (ref 7–23)
CALCIUM SERPL-MCNC: 8 MG/DL — LOW (ref 8.4–10.5)
CHLORIDE SERPL-SCNC: 96 MMOL/L — LOW (ref 98–107)
CO2 SERPL-SCNC: 18 MMOL/L — LOW (ref 22–31)
CREAT SERPL-MCNC: 0.91 MG/DL — SIGNIFICANT CHANGE UP (ref 0.5–1.3)
EOSINOPHIL # BLD AUTO: 0.01 K/UL — SIGNIFICANT CHANGE UP (ref 0–0.5)
EOSINOPHIL NFR BLD AUTO: 0.1 % — SIGNIFICANT CHANGE UP (ref 0–6)
GLUCOSE SERPL-MCNC: 87 MG/DL — SIGNIFICANT CHANGE UP (ref 70–99)
HCT VFR BLD CALC: 39.9 % — SIGNIFICANT CHANGE UP (ref 34.5–45)
HGB BLD-MCNC: 12.6 G/DL — SIGNIFICANT CHANGE UP (ref 11.5–15.5)
IMM GRANULOCYTES # BLD AUTO: 0.02 # — SIGNIFICANT CHANGE UP
IMM GRANULOCYTES NFR BLD AUTO: 0.3 % — SIGNIFICANT CHANGE UP (ref 0–1.5)
LYMPHOCYTES # BLD AUTO: 2.68 K/UL — SIGNIFICANT CHANGE UP (ref 1–3.3)
LYMPHOCYTES # BLD AUTO: 39 % — SIGNIFICANT CHANGE UP (ref 13–44)
MAGNESIUM SERPL-MCNC: 1.6 MG/DL — SIGNIFICANT CHANGE UP (ref 1.6–2.6)
MCHC RBC-ENTMCNC: 28.3 PG — SIGNIFICANT CHANGE UP (ref 27–34)
MCHC RBC-ENTMCNC: 31.6 % — LOW (ref 32–36)
MCV RBC AUTO: 89.5 FL — SIGNIFICANT CHANGE UP (ref 80–100)
MONOCYTES # BLD AUTO: 0.79 K/UL — SIGNIFICANT CHANGE UP (ref 0–0.9)
MONOCYTES NFR BLD AUTO: 11.5 % — SIGNIFICANT CHANGE UP (ref 2–14)
NEUTROPHILS # BLD AUTO: 3.35 K/UL — SIGNIFICANT CHANGE UP (ref 1.8–7.4)
NEUTROPHILS NFR BLD AUTO: 48.8 % — SIGNIFICANT CHANGE UP (ref 43–77)
NRBC # FLD: 0 — SIGNIFICANT CHANGE UP
PHOSPHATE SERPL-MCNC: 3.5 MG/DL — SIGNIFICANT CHANGE UP (ref 2.5–4.5)
PLATELET # BLD AUTO: 226 K/UL — SIGNIFICANT CHANGE UP (ref 150–400)
PMV BLD: 9.9 FL — SIGNIFICANT CHANGE UP (ref 7–13)
POTASSIUM SERPL-MCNC: 3.8 MMOL/L — SIGNIFICANT CHANGE UP (ref 3.5–5.3)
POTASSIUM SERPL-SCNC: 3.8 MMOL/L — SIGNIFICANT CHANGE UP (ref 3.5–5.3)
RBC # BLD: 4.46 M/UL — SIGNIFICANT CHANGE UP (ref 3.8–5.2)
RBC # FLD: 13.9 % — SIGNIFICANT CHANGE UP (ref 10.3–14.5)
SODIUM SERPL-SCNC: 132 MMOL/L — LOW (ref 135–145)
SPECIMEN SOURCE: SIGNIFICANT CHANGE UP
URATE SERPL-MCNC: 3.8 MG/DL — SIGNIFICANT CHANGE UP (ref 2.5–7)
WBC # BLD: 6.87 K/UL — SIGNIFICANT CHANGE UP (ref 3.8–10.5)
WBC # FLD AUTO: 6.87 K/UL — SIGNIFICANT CHANGE UP (ref 3.8–10.5)

## 2018-02-18 PROCEDURE — 99232 SBSQ HOSP IP/OBS MODERATE 35: CPT | Mod: GC

## 2018-02-18 RX ADMIN — Medication 25 MICROGRAM(S): at 06:40

## 2018-02-18 RX ADMIN — HEPARIN SODIUM 5000 UNIT(S): 5000 INJECTION INTRAVENOUS; SUBCUTANEOUS at 15:13

## 2018-02-18 RX ADMIN — Medication 3 MILLILITER(S): at 16:14

## 2018-02-18 RX ADMIN — Medication 50 MILLIGRAM(S): at 06:40

## 2018-02-18 RX ADMIN — Medication 1 SPRAY(S): at 13:13

## 2018-02-18 RX ADMIN — Medication 3 MILLILITER(S): at 21:41

## 2018-02-18 RX ADMIN — AMLODIPINE BESYLATE 5 MILLIGRAM(S): 2.5 TABLET ORAL at 06:41

## 2018-02-18 RX ADMIN — Medication 3 MILLILITER(S): at 10:02

## 2018-02-18 RX ADMIN — Medication 5 MILLIGRAM(S): at 22:04

## 2018-02-18 RX ADMIN — BUDESONIDE AND FORMOTEROL FUMARATE DIHYDRATE 2 PUFF(S): 160; 4.5 AEROSOL RESPIRATORY (INHALATION) at 22:02

## 2018-02-18 RX ADMIN — Medication 40 MILLIGRAM(S): at 06:40

## 2018-02-18 RX ADMIN — HEPARIN SODIUM 5000 UNIT(S): 5000 INJECTION INTRAVENOUS; SUBCUTANEOUS at 06:41

## 2018-02-18 RX ADMIN — HEPARIN SODIUM 5000 UNIT(S): 5000 INJECTION INTRAVENOUS; SUBCUTANEOUS at 22:04

## 2018-02-18 RX ADMIN — BUDESONIDE AND FORMOTEROL FUMARATE DIHYDRATE 2 PUFF(S): 160; 4.5 AEROSOL RESPIRATORY (INHALATION) at 09:52

## 2018-02-18 RX ADMIN — Medication 30 MILLIGRAM(S): at 06:40

## 2018-02-18 RX ADMIN — Medication 3 MILLILITER(S): at 04:17

## 2018-02-18 RX ADMIN — Medication 30 MILLIGRAM(S): at 18:04

## 2018-02-18 NOTE — DISCHARGE NOTE ADULT - CARE PROVIDER_API CALL
Judie Hernandez ([email or web address]), Medicine  [address] [place] [address]  Phone: [phone]  Fax: [phone]

## 2018-02-18 NOTE — PHYSICAL THERAPY INITIAL EVALUATION ADULT - PERTINENT HX OF CURRENT PROBLEM, REHAB EVAL
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

## 2018-02-18 NOTE — DISCHARGE NOTE ADULT - PLAN OF CARE
Follow up with primary care doctor within 1 week of discharge You were admitted to the hospital for worsening cough and shortness of breath and found to have influenza. You were started on tamiflu for this as well as prednisone given the asthma exacerbation from the respiratory virus. Please continue to take the tamiflu to complete the course of 5 days as well as the prednisone and your home inhaler. Please follow up with your primary care doctor within 1 week of discharge. If you experience any increased shortness of breath, cough, sputum production, fever or chills please contact your doctor immediately. You were admitted to the hospital for worsening cough and shortness of breath and found to have influenza. This caused an asthma exacerbation requiring inhalers and oral prednisone. Please continue to take the tamiflu to complete the course of 5 days as well as the prednisone and your home inhalers. Please follow up with your primary care doctor within 1 week of discharge. If you experience any increased shortness of breath, cough, sputum production, fever or chills please contact your doctor immediately. Please continue to take your synthroid as prescribed and follow up with your primary care doctor. Please continue to take your amlodipine and metoprolol as prescribed and follow up with your primary care doctor. You were admitted to the hospital for worsening cough and shortness of breath and found to have influenza. You were started on tamiflu for this as well as prednisone given the asthma exacerbation from the respiratory virus. Please continue to take the tamiflu to complete the course of 5 days as well as the prednisone and your home inhalers. Please follow up with your primary care doctor within 1 week of discharge. If you experience any increased shortness of breath, cough, sputum production, fever or chills please contact your doctor immediately. Please continue to take your amlodipine and metoprolol as prescribed and check your blood pressure at home. Please follow up with your primary care doctor for further management.

## 2018-02-18 NOTE — PROGRESS NOTE ADULT - PROBLEM SELECTOR PLAN 3
- Possibly 2/2 poor PO intake given her acute illness, s/p  cc in ED, will encourage PO intake  - Patient improved without further IVF. 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

## 2018-02-18 NOTE — PROGRESS NOTE ADULT - SUBJECTIVE AND OBJECTIVE BOX
Patient is a 87y old  Female who presents with a chief complaint of SOB, chills, cough (2018 23:24)      SUBJECTIVE / OVERNIGHT EVENTS:    MEDICATIONS  (STANDING):  ALBUTerol    90 MICROgram(s) HFA Inhaler 1 Puff(s) Inhalation every 4 hours  ALBUTerol/ipratropium for Nebulization 3 milliLiter(s) Nebulizer every 6 hours  amLODIPine   Tablet 5 milliGRAM(s) Oral daily  bisacodyl 5 milliGRAM(s) Oral at bedtime  buDESOnide 160 MICROgram(s)/formoterol 4.5 MICROgram(s) Inhaler 2 Puff(s) Inhalation two times a day  fluticasone propionate 50 MICROgram(s)/spray Nasal Spray 1 Spray(s) Both Nostrils daily  heparin  Injectable 5000 Unit(s) SubCutaneous every 8 hours  levothyroxine 25 MICROGram(s) Oral daily  metoprolol succinate ER 50 milliGRAM(s) Oral daily  oseltamivir 30 milliGRAM(s) Oral two times a day  predniSONE   Tablet 40 milliGRAM(s) Oral daily  tiotropium 18 MICROgram(s) Capsule 1 Capsule(s) Inhalation daily    MEDICATIONS  (PRN):        CAPILLARY BLOOD GLUCOSE        I&O's Summary      PHYSICAL EXAM:  GENERAL: NAD, well-developed  HEAD:  Atraumatic, Normocephalic  EYES: EOMI, PERRLA, conjunctiva and sclera clear  NECK: Supple, No JVD  CHEST/LUNG: Clear to auscultation bilaterally; No wheeze  HEART: Regular rate and rhythm; No murmurs, rubs, or gallops  ABDOMEN: Soft, Nontender, Nondistended; Bowel sounds present  EXTREMITIES:  2+ Peripheral Pulses, No clubbing, cyanosis, or edema  PSYCH: AAOx3  NEUROLOGY: non-focal  SKIN: No rashes or lesions    LABS:  ( @ 05:45)                        12.6  6.87 )-----------( 226                 39.9    Neutrophils = 3.35 (48.8%)  Lymphocytes = 2.68 (39.0%)  Eosinophils = 0.01 (0.1%)  Basophils = 0.02 (0.3%)  Monocytes = 0.79 (11.5%)  Bands = --%    WBC Trend: 6.87<--, 4.01<--, 5.67<--  Hb Trend: 12.6<--, 11.8<--, 12.6<--  Plt Trend: 226<--, 214<--, 187<--  02-17    130<L>  |  94<L>  |  12  ----------------------------<  148<H>  4.5   |  20<L>  |  0.83    Ca    8.4      2018 05:17  Phos  3.5       Mg     1.7         TPro  7.7  /  Alb  3.7  /  TBili  0.2  /  DBili  x   /  AST  36<H>  /  ALT  16  /  AlkPhos  89      Creatinine Trend: 0.83<--, 0.99<--, 0.98<--    CARDIAC MARKERS ( 2018 17:54 )  Trop < 0.06 ng/mL /  u/L / CKMB 1.80 ng/mL       Urinalysis Basic - ( 2018 18:30 )    Color: YELLOW / Appearance: CLEAR / S.021 / pH: 6.0  Gluc: NEGATIVE / Ketone: NEGATIVE  / Bili: NEGATIVE / Urobili: NORMAL mg/dL   Blood: TRACE / Protein: 30 mg/dL / Nitrite: NEGATIVE   Leuk Esterase: NEGATIVE / RBC: 0-2 / WBC 2-5   Sq Epi: OCC / Non Sq Epi: x / Bacteria: x        Microbiology:  Urine Cx:  Blood Cx:    RADIOLOGY & ADDITIONAL TESTS:  X- Ray:  CT:  Ultrasound:  [ ] imaging personally reviewed and interpreted by me    Consultant(s) Notes Reviewed:      Care Discussed with Consultants/Other Providers: Patient is a 87y old  Female who presents with a chief complaint of SOB, chills, cough (2018 23:24)      SUBJECTIVE / OVERNIGHT EVENTS: No acute events overnight. Patient seen this morning awake laying in bed. Family at bedside translating. Patient states she is hungry. Had BM early this morning with no problems. Ambulated to bathroom with assistance. No complaints of SOB, CP, dizziness, abdominal pain, N/V. Family reports breathing sounds and cough has improved.     MEDICATIONS  (STANDING):  ALBUTerol    90 MICROgram(s) HFA Inhaler 1 Puff(s) Inhalation every 4 hours  ALBUTerol/ipratropium for Nebulization 3 milliLiter(s) Nebulizer every 6 hours  amLODIPine   Tablet 5 milliGRAM(s) Oral daily  bisacodyl 5 milliGRAM(s) Oral at bedtime  buDESOnide 160 MICROgram(s)/formoterol 4.5 MICROgram(s) Inhaler 2 Puff(s) Inhalation two times a day  fluticasone propionate 50 MICROgram(s)/spray Nasal Spray 1 Spray(s) Both Nostrils daily  heparin  Injectable 5000 Unit(s) SubCutaneous every 8 hours  levothyroxine 25 MICROGram(s) Oral daily  metoprolol succinate ER 50 milliGRAM(s) Oral daily  oseltamivir 30 milliGRAM(s) Oral two times a day  predniSONE   Tablet 40 milliGRAM(s) Oral daily  tiotropium 18 MICROgram(s) Capsule 1 Capsule(s) Inhalation daily      PHYSICAL EXAM:  Vital Signs Last 24 Hrs  T(C): 36.3 (2018 06:36), Max: 36.6 (2018 15:02)  T(F): 97.3 (2018 06:36), Max: 97.9 (2018 15:02)  HR: 87 (2018 10:02) (80 - 90)  BP: 139/86 (2018 06:36) (138/72 - 154/91)  BP(mean): --  RR: 20 (2018 06:36) (20 - 20)  SpO2: 97% (2018 10:02) (97% - 99%)    GENERAL: NAD, well-developed, elderly female  HEAD:  Atraumatic, Normocephalic  EYES: EOMI,  conjunctiva and sclera clear  NECK: Supple  CHEST/LUNG: Good air movement, no wheezing or rales on auscultation  HEART: Regular rate and rhythm; No murmurs, rubs, or gallops  ABDOMEN: Soft, Nontender, Nondistended; Bowel sounds present  EXTREMITIES:  No clubbing, cyanosis, or edema  PSYCH: AAOx3  NEUROLOGY: non-focal  SKIN: No rashes or lesions    LABS:  ( @ 05:45)                        12.6  6.87 )-----------( 226                 39.9    Neutrophils = 3.35 (48.8%)  Lymphocytes = 2.68 (39.0%)  Eosinophils = 0.01 (0.1%)  Basophils = 0.02 (0.3%)  Monocytes = 0.79 (11.5%)  Bands = --%    WBC Trend: 6.87<--, 4.01<--, 5.67<--  Hb Trend: 12.6<--, 11.8<--, 12.6<--  Plt Trend: 226<--, 214<--, 187<--  02-17    130<L>  |  94<L>  |  12  ----------------------------<  148<H>  4.5   |  20<L>  |  0.83    Ca    8.4      2018 05:17  Phos  3.5     02-  Mg     1.7         TPro  7.7  /  Alb  3.7  /  TBili  0.2  /  DBili  x   /  AST  36<H>  /  ALT  16  /  AlkPhos  89      Creatinine Trend: 0.83<--, 0.99<--, 0.98<--    CARDIAC MARKERS ( 2018 17:54 )  Trop < 0.06 ng/mL /  u/L / CKMB 1.80 ng/mL       Urinalysis Basic - ( 2018 18:30 )    Color: YELLOW / Appearance: CLEAR / S.021 / pH: 6.0  Gluc: NEGATIVE / Ketone: NEGATIVE  / Bili: NEGATIVE / Urobili: NORMAL mg/dL   Blood: TRACE / Protein: 30 mg/dL / Nitrite: NEGATIVE   Leuk Esterase: NEGATIVE / RBC: 0-2 / WBC 2-5   Sq Epi: OCC / Non Sq Epi: x / Bacteria: x        Microbiology:  Urine Cx: NGTD  Blood Cx: NGTD    RADIOLOGY & ADDITIONAL TESTS:  X- Ray: < from: Xray Chest 1 View AP/PA (18 @ 18:59) >  IMPRESSION:  Clear lungs. No pleural effusions or pneumothorax.    Stable left chest wall dual-lead pacemaker and cardiac and mediastinal   silhouettes.    Trachea midline.    Stable osteopenic osseous structures. Patient is a 87y old  Female who presents with a chief complaint of SOB, chills, cough (2018 23:24)      SUBJECTIVE / OVERNIGHT EVENTS: No acute events overnight. Patient seen this morning awake laying in bed. Family at bedside translating. Patient states she is hungry. Had BM early this morning with no problems. Ambulated to bathroom with assistance. No complaints of SOB, CP, dizziness, abdominal pain, N/V. Family reports breathing sounds and cough has improved.     MEDICATIONS  (STANDING):  ALBUTerol    90 MICROgram(s) HFA Inhaler 1 Puff(s) Inhalation every 4 hours  ALBUTerol/ipratropium for Nebulization 3 milliLiter(s) Nebulizer every 6 hours  amLODIPine   Tablet 5 milliGRAM(s) Oral daily  bisacodyl 5 milliGRAM(s) Oral at bedtime  buDESOnide 160 MICROgram(s)/formoterol 4.5 MICROgram(s) Inhaler 2 Puff(s) Inhalation two times a day  fluticasone propionate 50 MICROgram(s)/spray Nasal Spray 1 Spray(s) Both Nostrils daily  heparin  Injectable 5000 Unit(s) SubCutaneous every 8 hours  levothyroxine 25 MICROGram(s) Oral daily  metoprolol succinate ER 50 milliGRAM(s) Oral daily  oseltamivir 30 milliGRAM(s) Oral two times a day  predniSONE   Tablet 40 milliGRAM(s) Oral daily  tiotropium 18 MICROgram(s) Capsule 1 Capsule(s) Inhalation daily      PHYSICAL EXAM:  Vital Signs Last 24 Hrs  T(C): 36.3 (2018 06:36), Max: 36.6 (2018 15:02)  T(F): 97.3 (2018 06:36), Max: 97.9 (2018 15:02)  HR: 87 (2018 10:02) (80 - 90)  BP: 139/86 (2018 06:36) (138/72 - 154/91)  BP(mean): --  RR: 20 (2018 06:36) (20 - 20)  SpO2: 97% (2018 10:02) (97% - 99%)    GENERAL: NAD, well-developed, elderly female  HEAD:  Atraumatic, Normocephalic  EYES: EOMI,  conjunctiva and sclera clear  NECK: Supple  CHEST/LUNG: Good air movement, faint wheeze.  improved from yesterday  HEART: Regular rate and rhythm; No murmurs, rubs, or gallops  ABDOMEN: Soft, Nontender, Nondistended; Bowel sounds present  EXTREMITIES:  No clubbing, cyanosis, or edema  PSYCH: AAOx3  NEUROLOGY: non-focal  SKIN: No rashes or lesions    LABS:  ( @ 05:45)                        12.6  6.87 )-----------( 226                 39.9    Neutrophils = 3.35 (48.8%)  Lymphocytes = 2.68 (39.0%)  Eosinophils = 0.01 (0.1%)  Basophils = 0.02 (0.3%)  Monocytes = 0.79 (11.5%)  Bands = --%    WBC Trend: 6.87<--, 4.01<--, 5.67<--  Hb Trend: 12.6<--, 11.8<--, 12.6<--  Plt Trend: 226<--, 214<--, 187<--  -    130<L>  |  94<L>  |  12  ----------------------------<  148<H>  4.5   |  20<L>  |  0.83    Ca    8.4      2018 05:17  Phos  3.5     -  Mg     1.7         TPro  7.7  /  Alb  3.7  /  TBili  0.2  /  DBili  x   /  AST  36<H>  /  ALT  16  /  AlkPhos  89      Creatinine Trend: 0.83<--, 0.99<--, 0.98<--    CARDIAC MARKERS ( 2018 17:54 )  Trop < 0.06 ng/mL /  u/L / CKMB 1.80 ng/mL       Urinalysis Basic - ( 2018 18:30 )    Color: YELLOW / Appearance: CLEAR / S.021 / pH: 6.0  Gluc: NEGATIVE / Ketone: NEGATIVE  / Bili: NEGATIVE / Urobili: NORMAL mg/dL   Blood: TRACE / Protein: 30 mg/dL / Nitrite: NEGATIVE   Leuk Esterase: NEGATIVE / RBC: 0-2 / WBC 2-5   Sq Epi: OCC / Non Sq Epi: x / Bacteria: x        Microbiology:  Urine Cx: NGTD  Blood Cx: NGTD    RADIOLOGY & ADDITIONAL TESTS:  X- Ray: < from: Xray Chest 1 View AP/PA (18 @ 18:59) >  IMPRESSION:  Clear lungs. No pleural effusions or pneumothorax.    Stable left chest wall dual-lead pacemaker and cardiac and mediastinal   silhouettes.    Trachea midline.    Stable osteopenic osseous structures.

## 2018-02-18 NOTE — PROGRESS NOTE ADULT - PROBLEM SELECTOR PLAN 1
- Given acute onset of her symptoms, would start her on tamiflu and observe response (tamiflu 30mg BID dosed for creatinine clearance of 37)  - c/w supportive care as needed - c/w tamiflu 30mg BID dosed for creatinine clearance of 37, improvement in symptoms  - c/w supportive care as needed

## 2018-02-18 NOTE — DISCHARGE NOTE ADULT - SECONDARY DIAGNOSIS.
Exacerbation of asthma, unspecified asthma severity, unspecified whether persistent Hypothyroidism, unspecified type Essential hypertension

## 2018-02-18 NOTE — DISCHARGE NOTE ADULT - CARE PLAN
Principal Discharge DX:	Influenza A  Goal:	Follow up with primary care doctor within 1 week of discharge  Assessment and plan of treatment:	You were admitted to the hospital for worsening cough and shortness of breath and found to have influenza. You were started on tamiflu for this as well as prednisone given the asthma exacerbation from the respiratory virus. Please continue to take the tamiflu to complete the course of 5 days as well as the prednisone and your home inhaler. Please follow up with your primary care doctor within 1 week of discharge. If you experience any increased shortness of breath, cough, sputum production, fever or chills please contact your doctor immediately.  Secondary Diagnosis:	Exacerbation of asthma, unspecified asthma severity, unspecified whether persistent  Assessment and plan of treatment:	You were admitted to the hospital for worsening cough and shortness of breath and found to have influenza. This caused an asthma exacerbation requiring inhalers and oral prednisone. Please continue to take the tamiflu to complete the course of 5 days as well as the prednisone and your home inhalers. Please follow up with your primary care doctor within 1 week of discharge. If you experience any increased shortness of breath, cough, sputum production, fever or chills please contact your doctor immediately.  Secondary Diagnosis:	Hypothyroidism, unspecified type  Assessment and plan of treatment:	Please continue to take your synthroid as prescribed and follow up with your primary care doctor.  Secondary Diagnosis:	Essential hypertension  Assessment and plan of treatment:	Please continue to take your amlodipine and metoprolol as prescribed and follow up with your primary care doctor. Principal Discharge DX:	Influenza A  Goal:	Follow up with primary care doctor within 1 week of discharge  Assessment and plan of treatment:	You were admitted to the hospital for worsening cough and shortness of breath and found to have influenza. You were started on tamiflu for this as well as prednisone given the asthma exacerbation from the respiratory virus. Please continue to take the tamiflu to complete the course of 5 days as well as the prednisone and your home inhalers. Please follow up with your primary care doctor within 1 week of discharge. If you experience any increased shortness of breath, cough, sputum production, fever or chills please contact your doctor immediately.  Secondary Diagnosis:	Exacerbation of asthma, unspecified asthma severity, unspecified whether persistent  Assessment and plan of treatment:	You were admitted to the hospital for worsening cough and shortness of breath and found to have influenza. This caused an asthma exacerbation requiring inhalers and oral prednisone. Please continue to take the tamiflu to complete the course of 5 days as well as the prednisone and your home inhalers. Please follow up with your primary care doctor within 1 week of discharge. If you experience any increased shortness of breath, cough, sputum production, fever or chills please contact your doctor immediately.  Secondary Diagnosis:	Hypothyroidism, unspecified type  Assessment and plan of treatment:	Please continue to take your synthroid as prescribed and follow up with your primary care doctor.  Secondary Diagnosis:	Essential hypertension  Assessment and plan of treatment:	Please continue to take your amlodipine and metoprolol as prescribed and check your blood pressure at home. Please follow up with your primary care doctor for further management.

## 2018-02-18 NOTE — DISCHARGE NOTE ADULT - MEDICATION SUMMARY - MEDICATIONS TO TAKE
I will START or STAY ON the medications listed below when I get home from the hospital:    nebulizer machine  -- 1 application by mouth every 6 hours, As Needed -for shortness of breath and/or wheezing   -- Indication: For Asthma    blood pressure machine  -- Use machine once a day for  blood pressure monitoring  -- Indication: For Hypertension    predniSONE 20 mg oral tablet  -- 2 tab(s) by mouth once a day  -- Indication: For Asthma    oseltamivir 30 mg oral capsule  -- 1 cap(s) by mouth 2 times a day until finished  -- Indication: For Influenza A    metoprolol succinate 50 mg oral tablet, extended release  -- 1 tab(s) by mouth once a day  -- Indication: For Hypertension    albuterol 2.5 mg/3 mL (0.083%) inhalation solution  -- 3 milliliter(s) by nebulizer every 6 hours, As Needed -for shortness of breath and/or wheezing   -- For inhalation only.  It is very important that you take or use this exactly as directed.  Do not skip doses or discontinue unless directed by your doctor.  Obtain medical advice before taking any non-prescription drugs as some may affect the action of this medication.    -- Indication: For Asthma    ProAir HFA 90 mcg/inh inhalation aerosol  -- 2 puff(s) inhaled 4 times a day, As Needed  -- Indication: For Asthma    Advair Diskus 250 mcg-50 mcg inhalation powder  -- 1 puff(s) inhaled 2 times a day  -- Indication: For Asthma    amLODIPine 5 mg oral tablet  -- 1 tab(s) by mouth once a day  -- It is very important that you take or use this exactly as directed.  Do not skip doses or discontinue unless directed by your doctor.  Some non-prescription drugs may aggravate your condition.  Read all labels carefully.  If a warning appears, check with your doctor before taking.    -- Indication: For Hypertension    bisacodyl 5 mg oral delayed release tablet  -- 1 tab(s) by mouth once a day (at bedtime)  -- Indication: For Constipation    Flonase 50 mcg/inh nasal spray  -- 1 spray(s) into nose once a day  -- Indication: For Nasal Congestion    levothyroxine 25 mcg (0.025 mg) oral tablet  -- 1 tab(s) by mouth once a day  -- Indication: For Hypothyroidism, unspecified type

## 2018-02-18 NOTE — PROGRESS NOTE ADULT - PROBLEM SELECTOR PLAN 2
- Currently with clear lungs and good respiratory effort  - Will place on prednisone 40 daily and duonebs  - c/w home advair - Currently with clear lungs and good respiratory effort  - c/w prednisone 40 daily (for 5 days) and duonebs  - c/w home advair

## 2018-02-18 NOTE — DISCHARGE NOTE ADULT - PATIENT PORTAL LINK FT
You can access the [facility] Patient Portal, offered by [facility], by registering with the following website: [email or web address]

## 2018-02-19 VITALS — OXYGEN SATURATION: 99 %

## 2018-02-19 PROCEDURE — 99239 HOSP IP/OBS DSCHRG MGMT >30: CPT

## 2018-02-19 RX ORDER — METOPROLOL TARTRATE 50 MG
1 TABLET ORAL
Qty: 0 | Refills: 0 | COMMUNITY

## 2018-02-19 RX ORDER — ALBUTEROL 90 UG/1
3 AEROSOL, METERED ORAL
Qty: 42 | Refills: 0
Start: 2018-02-19 | End: 2018-03-04

## 2018-02-19 RX ORDER — METOPROLOL TARTRATE 50 MG
1 TABLET ORAL
Qty: 14 | Refills: 0
Start: 2018-02-19 | End: 2018-03-04

## 2018-02-19 RX ORDER — ACETAMINOPHEN 500 MG
2 TABLET ORAL
Qty: 0 | Refills: 0 | COMMUNITY

## 2018-02-19 RX ADMIN — HEPARIN SODIUM 5000 UNIT(S): 5000 INJECTION INTRAVENOUS; SUBCUTANEOUS at 06:44

## 2018-02-19 RX ADMIN — Medication 1 SPRAY(S): at 12:29

## 2018-02-19 RX ADMIN — Medication 30 MILLIGRAM(S): at 06:44

## 2018-02-19 RX ADMIN — Medication 25 MICROGRAM(S): at 06:44

## 2018-02-19 RX ADMIN — Medication 50 MILLIGRAM(S): at 06:44

## 2018-02-19 RX ADMIN — AMLODIPINE BESYLATE 5 MILLIGRAM(S): 2.5 TABLET ORAL at 06:44

## 2018-02-19 RX ADMIN — BUDESONIDE AND FORMOTEROL FUMARATE DIHYDRATE 2 PUFF(S): 160; 4.5 AEROSOL RESPIRATORY (INHALATION) at 10:20

## 2018-02-19 RX ADMIN — Medication 40 MILLIGRAM(S): at 06:44

## 2018-02-19 RX ADMIN — Medication 3 MILLILITER(S): at 09:11

## 2018-02-19 RX ADMIN — Medication 3 MILLILITER(S): at 04:14

## 2018-02-19 NOTE — PROGRESS NOTE ADULT - PROBLEM SELECTOR PLAN 3
- Possibly 2/2 poor PO intake given her acute illness, s/p  cc in ED, will encourage PO intake  - Continuing to improve. Patient with Ramesh of 55, may have likely component of SIADH - Possibly 2/2 poor PO intake given her acute illness, s/p  cc in ED, good PO intake at this time  - Continuing to improve. 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

## 2018-02-19 NOTE — PROGRESS NOTE ADULT - PROBLEM SELECTOR PLAN 2
- Currently with clear lungs and good respiratory effort  - c/w prednisone 40 daily (for 5 days) and duonebs  - c/w home advair

## 2018-02-19 NOTE — PROGRESS NOTE ADULT - PROBLEM SELECTOR PLAN 5
- c/w home meds with hold parameters

## 2018-02-19 NOTE — PROGRESS NOTE ADULT - ASSESSMENT
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

## 2018-02-19 NOTE — PROGRESS NOTE ADULT - ATTENDING COMMENTS
Pt seen and examined with HS on above date.  Agree with above HS note.  Plan discussed with House staff.    87 F with asthma a/w flu and asthma exacerbation.  on tamiflu, steroids, nebs.  improving.  hyponatremia improved, ? SIADH
stable for dc home. o/p pmd follow-up.  dc time 42 minutes
Pt seen and examined with HS on above date.  Agree with above HS note.  Plan discussed with House staff.    87 F with asthma a/w flu and asthma exacerbation.  on tamiflu, steroids, nebs.  improving.  hyponatremia improved, ? SIADH

## 2018-02-19 NOTE — PROGRESS NOTE ADULT - PROBLEM SELECTOR PLAN 4
- Pt complaining fo PPM beeping while coughing, seen by EP and PPM interrogated, normal functioning noted with no events

## 2018-02-19 NOTE — PROGRESS NOTE ADULT - PROBLEM SELECTOR PROBLEM 2
Moderate asthma with exacerbation, unspecified whether persistent

## 2018-02-19 NOTE — PROGRESS NOTE ADULT - PROBLEM SELECTOR PLAN 1
- c/w tamiflu 30mg BID dosed for creatinine clearance of 37, improvement in symptoms  - c/w supportive care as needed - c/w tamiflu 30mg BID dosed for creatinine clearance of 37, improvement in symptoms (end 2/21)  - c/w supportive care as needed

## 2018-02-19 NOTE — PROGRESS NOTE ADULT - PROBLEM SELECTOR PLAN 7
- HSQ for DVT ppx  - dispo: PT eval, walks with cane/walker at home - HSQ for DVT ppx  - dispo: PT eval-home with home PT

## 2018-02-19 NOTE — PROGRESS NOTE ADULT - SUBJECTIVE AND OBJECTIVE BOX
Patient is a 87y old  Female who presents with a chief complaint of SOB, chills, cough (18 Feb 2018 11:36)      SUBJECTIVE / OVERNIGHT EVENTS:    MEDICATIONS  (STANDING):  ALBUTerol    90 MICROgram(s) HFA Inhaler 1 Puff(s) Inhalation every 4 hours  ALBUTerol/ipratropium for Nebulization 3 milliLiter(s) Nebulizer every 6 hours  amLODIPine   Tablet 5 milliGRAM(s) Oral daily  bisacodyl 5 milliGRAM(s) Oral at bedtime  buDESOnide 160 MICROgram(s)/formoterol 4.5 MICROgram(s) Inhaler 2 Puff(s) Inhalation two times a day  fluticasone propionate 50 MICROgram(s)/spray Nasal Spray 1 Spray(s) Both Nostrils daily  heparin  Injectable 5000 Unit(s) SubCutaneous every 8 hours  levothyroxine 25 MICROGram(s) Oral daily  metoprolol succinate ER 50 milliGRAM(s) Oral daily  oseltamivir 30 milliGRAM(s) Oral two times a day  predniSONE   Tablet 40 milliGRAM(s) Oral daily  tiotropium 18 MICROgram(s) Capsule 1 Capsule(s) Inhalation daily    MEDICATIONS  (PRN):        CAPILLARY BLOOD GLUCOSE        I&O's Summary      PHYSICAL EXAM:  Vital Signs Last 24 Hrs  T(C): 36.7 (19 Feb 2018 06:41), Max: 36.8 (18 Feb 2018 21:50)  T(F): 98.1 (19 Feb 2018 06:41), Max: 98.2 (18 Feb 2018 21:50)  HR: 79 (19 Feb 2018 06:41) (79 - 90)  BP: 132/80 (19 Feb 2018 06:41) (132/80 - 142/82)  BP(mean): --  RR: 18 (19 Feb 2018 06:41) (18 - 20)  SpO2: 100% (19 Feb 2018 06:41) (97% - 100%)    GENERAL: NAD, well-developed, elderly female  HEAD:  Atraumatic, Normocephalic  EYES: EOMI,  conjunctiva and sclera clear  NECK: Supple  CHEST/LUNG: Good air movement, faint wheeze.  improved from yesterday  HEART: Regular rate and rhythm; No murmurs, rubs, or gallops  ABDOMEN: Soft, Nontender, Nondistended; Bowel sounds present  EXTREMITIES:  No clubbing, cyanosis, or edema  PSYCH: AAOx3  NEUROLOGY: non-focal  SKIN: No rashes or lesions    LABS:    WBC Trend: 6.87<--, 4.01<--, 5.67<--  Hb Trend: 12.6<--, 11.8<--, 12.6<--  Plt Trend: 226<--, 214<--, 187<--  02-18    132<L>  |  96<L>  |  19  ----------------------------<  87  3.8   |  18<L>  |  0.91    Ca    8.0<L>      18 Feb 2018 05:40  Phos  3.5     02-18  Mg     1.6     02-18      Creatinine Trend: 0.91<--, 0.83<--, 0.99<--, 0.98<--            Microbiology:  Urine Cx:  Blood Cx:    RADIOLOGY & ADDITIONAL TESTS:  X- Ray:  CT:  Ultrasound:  [ ] imaging personally reviewed and interpreted by me    Consultant(s) Notes Reviewed:      Care Discussed with Consultants/Other Providers: Patient is a 87y old  Female who presents with a chief complaint of SOB, chills, cough (18 Feb 2018 11:36)      SUBJECTIVE / OVERNIGHT EVENTS: No acute events overnight. Patient continuing to have cough however improved. Family at bedside, report improved breathing, wheezing and cough since admission. Denies CP, SOB, N/V. Report good PO intake.     MEDICATIONS  (STANDING):  ALBUTerol    90 MICROgram(s) HFA Inhaler 1 Puff(s) Inhalation every 4 hours  ALBUTerol/ipratropium for Nebulization 3 milliLiter(s) Nebulizer every 6 hours  amLODIPine   Tablet 5 milliGRAM(s) Oral daily  bisacodyl 5 milliGRAM(s) Oral at bedtime  buDESOnide 160 MICROgram(s)/formoterol 4.5 MICROgram(s) Inhaler 2 Puff(s) Inhalation two times a day  fluticasone propionate 50 MICROgram(s)/spray Nasal Spray 1 Spray(s) Both Nostrils daily  heparin  Injectable 5000 Unit(s) SubCutaneous every 8 hours  levothyroxine 25 MICROGram(s) Oral daily  metoprolol succinate ER 50 milliGRAM(s) Oral daily  oseltamivir 30 milliGRAM(s) Oral two times a day  predniSONE   Tablet 40 milliGRAM(s) Oral daily  tiotropium 18 MICROgram(s) Capsule 1 Capsule(s) Inhalation daily      PHYSICAL EXAM:  Vital Signs Last 24 Hrs  T(C): 36.7 (19 Feb 2018 06:41), Max: 36.8 (18 Feb 2018 21:50)  T(F): 98.1 (19 Feb 2018 06:41), Max: 98.2 (18 Feb 2018 21:50)  HR: 79 (19 Feb 2018 06:41) (79 - 90)  BP: 132/80 (19 Feb 2018 06:41) (132/80 - 142/82)  BP(mean): --  RR: 18 (19 Feb 2018 06:41) (18 - 20)  SpO2: 100% (19 Feb 2018 06:41) (97% - 100%)    GENERAL: NAD, well-developed, elderly female  HEAD:  Atraumatic, Normocephalic  EYES: EOMI,  conjunctiva and sclera clear  NECK: Supple  CHEST/LUNG: Good air movement, faint end-expiratory wheeze  HEART: Regular rate and rhythm; No murmurs, rubs, or gallops  ABDOMEN: Soft, Nontender, Nondistended; Bowel sounds present  EXTREMITIES:  No clubbing, cyanosis, or edema  PSYCH: AAOx3  NEUROLOGY: non-focal  SKIN: No rashes or lesions    LABS:    WBC Trend: 6.87<--, 4.01<--, 5.67<--  Hb Trend: 12.6<--, 11.8<--, 12.6<--  Plt Trend: 226<--, 214<--, 187<--  02-18    132<L>  |  96<L>  |  19  ----------------------------<  87  3.8   |  18<L>  |  0.91    Ca    8.0<L>      18 Feb 2018 05:40  Phos  3.5     02-18  Mg     1.6     02-18      Creatinine Trend: 0.91<--, 0.83<--, 0.99<--, 0.98<--            Microbiology:  Urine Cx: negative  Blood Cx: negative    RADIOLOGY & ADDITIONAL TESTS: no new imaging

## 2018-02-21 LAB
BACTERIA BLD CULT: SIGNIFICANT CHANGE UP
BACTERIA BLD CULT: SIGNIFICANT CHANGE UP

## 2018-03-29 ENCOUNTER — APPOINTMENT (OUTPATIENT)
Dept: ELECTROPHYSIOLOGY | Facility: CLINIC | Age: 83
End: 2018-03-29
Payer: MEDICAID

## 2018-03-29 PROBLEM — I10 ESSENTIAL (PRIMARY) HYPERTENSION: Chronic | Status: ACTIVE | Noted: 2018-02-16

## 2018-03-29 PROBLEM — J45.909 UNSPECIFIED ASTHMA, UNCOMPLICATED: Chronic | Status: ACTIVE | Noted: 2018-02-16

## 2018-03-29 PROCEDURE — 93280 PM DEVICE PROGR EVAL DUAL: CPT

## 2018-03-29 RX ORDER — HYDROCHLOROTHIAZIDE 25 MG/1
25 TABLET ORAL DAILY
Refills: 0 | Status: DISCONTINUED | COMMUNITY
End: 2018-03-29

## 2018-03-29 RX ORDER — BUDESONIDE AND FORMOTEROL FUMARATE DIHYDRATE 160; 4.5 UG/1; UG/1
160-4.5 AEROSOL RESPIRATORY (INHALATION)
Refills: 0 | Status: ACTIVE | COMMUNITY

## 2018-03-29 RX ORDER — FLUTICASONE PROPIONATE AND SALMETEROL 50; 250 UG/1; UG/1
250-50 POWDER RESPIRATORY (INHALATION)
Refills: 0 | Status: ACTIVE | COMMUNITY

## 2018-03-29 RX ORDER — FLUTICASONE PROPIONATE 50 UG/1
50 SPRAY, METERED NASAL
Refills: 0 | Status: ACTIVE | COMMUNITY

## 2018-09-27 ENCOUNTER — APPOINTMENT (OUTPATIENT)
Dept: ELECTROPHYSIOLOGY | Facility: CLINIC | Age: 83
End: 2018-09-27
Payer: MEDICAID

## 2018-09-27 PROCEDURE — 93280 PM DEVICE PROGR EVAL DUAL: CPT

## 2018-09-27 RX ORDER — LEVOTHYROXINE SODIUM 0.05 MG/1
50 TABLET ORAL
Refills: 0 | Status: ACTIVE | COMMUNITY

## 2019-01-07 ENCOUNTER — APPOINTMENT (OUTPATIENT)
Dept: ELECTROPHYSIOLOGY | Facility: CLINIC | Age: 84
End: 2019-01-07

## 2019-03-28 ENCOUNTER — APPOINTMENT (OUTPATIENT)
Dept: ELECTROPHYSIOLOGY | Facility: CLINIC | Age: 84
End: 2019-03-28
Payer: MEDICAID

## 2019-03-28 PROCEDURE — 93280 PM DEVICE PROGR EVAL DUAL: CPT

## 2019-03-28 RX ORDER — UBIDECARENONE/VIT E ACET 100MG-5
CAPSULE ORAL
Refills: 0 | Status: ACTIVE | COMMUNITY

## 2019-03-28 RX ORDER — BISACODYL 5 MG/1
5 TABLET, DELAYED RELEASE ORAL
Refills: 0 | Status: ACTIVE | COMMUNITY

## 2019-05-07 ENCOUNTER — APPOINTMENT (OUTPATIENT)
Dept: NEUROLOGY | Facility: CLINIC | Age: 84
End: 2019-05-07

## 2019-05-14 ENCOUNTER — APPOINTMENT (OUTPATIENT)
Dept: NEUROLOGY | Facility: CLINIC | Age: 84
End: 2019-05-14
Payer: MEDICAID

## 2019-05-14 VITALS — DIASTOLIC BLOOD PRESSURE: 77 MMHG | SYSTOLIC BLOOD PRESSURE: 146 MMHG | HEART RATE: 76 BPM

## 2019-05-14 DIAGNOSIS — G20 PARKINSON'S DISEASE: ICD-10-CM

## 2019-05-14 PROCEDURE — 99205 OFFICE O/P NEW HI 60 MIN: CPT

## 2019-05-14 NOTE — REASON FOR VISIT
[Initial Evaluation] : an initial evaluation [Family Member] : family member [FreeTextEntry1] : evaluate for Parkinson's disease

## 2019-05-14 NOTE — ASSESSMENT
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 Will order CTH to evaluate for size of ventricles and basal ganglia lesion. No MRI d/t pacemaker. Refer to movement clinic.\par \par \par More than 50% of time spent on counseling and educating patient on differential, workup, disease course, and treatment/management. All questions and concerns answered and addressed in detail to patient's complete satisfaction. Patient verbalized understanding and agreed to plan.\par

## 2019-05-14 NOTE — HISTORY OF PRESENT ILLNESS
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 Dez Bennett for evaluation of Parkinson's disease. Pt today is accompanied by daughter and granddaughter. History obtained from daughter and granddaughter. \par \par About two years ago, pt lost ability to walk unassisted. Family feels she has become increasingly stiff over the last 1-2 years, and this is the primary reason for impaired ability to walk. Family also noted increased bradykinesia over the last yr, along with increased confusion and decline in cognition. Although family denied gross tremor, moderate action tremor was noted in R>L hand during exam. No hallucination, language regression, or change in personality noted. Has had the need for adult diaper in the past 2 years.\par \par PSH:\par fractured RLE s/p korin placement ~1997 to 1998\par PPM implanted 3/2016

## 2019-05-14 NOTE — PHYSICAL EXAM
[FreeTextEntry1] : GENERAL PHYSICAL EXAM:\par GEN: well appearing, well nourished, no distress, normal affect, cooperative \par HEENT: NCAT, OP clear\par EYES: sclera white, conjunctiva clear, no nystagmus\par NECK: supple		\par GI: soft ABD, +BS, NT, ND\par SKIN: warm, dry, no rash or lesion on exposed skin \par \par NEUROLOGICAL EXAM:\par Mental Status\par Orientation: alert and oriented to herself only \par Language: clear and fluent in Demario, intact comprehension and repetition, intact naming and reading\par \par Cranial Nerves\par II: full visual fields intact \par III, IV, VI: PERRL, pt did not understand instruction to visual track\par V, VII: facial sensation and movement intact and symmetric \par VIII: hearing intact \par IX, X: uvula midline, soft palate elevates normally \par XI: BL shoulder shrug intact \par XII: tongue midline\par \par Motor\par 4/5 throughout\par action tremor in R>L hands\par difficult to assess for rigidity and tone because pt did not comprehend instruction to keep herself relaxed\par \par Sensation\par Intact to light touch in all 4 EXTs\par \par Reflex\par 2+ in BL biceps, triceps, brachioradiali, patella\par \par Coordination\par Normal FTN\par \par Gait\par wheelchair bound\par \par

## 2019-07-02 ENCOUNTER — APPOINTMENT (OUTPATIENT)
Dept: NEUROLOGY | Facility: CLINIC | Age: 84
End: 2019-07-02

## 2019-07-29 ENCOUNTER — APPOINTMENT (OUTPATIENT)
Dept: HEPATOLOGY | Facility: CLINIC | Age: 84
End: 2019-07-29
Payer: MEDICAID

## 2019-07-29 VITALS
RESPIRATION RATE: 16 BRPM | HEIGHT: 60 IN | DIASTOLIC BLOOD PRESSURE: 78 MMHG | BODY MASS INDEX: 26.5 KG/M2 | SYSTOLIC BLOOD PRESSURE: 135 MMHG | WEIGHT: 135 LBS | TEMPERATURE: 98.3 F | HEART RATE: 71 BPM

## 2019-07-29 DIAGNOSIS — E66.3 OVERWEIGHT: ICD-10-CM

## 2019-07-29 DIAGNOSIS — Z78.9 OTHER SPECIFIED HEALTH STATUS: ICD-10-CM

## 2019-07-29 DIAGNOSIS — N39.0 URINARY TRACT INFECTION, SITE NOT SPECIFIED: ICD-10-CM

## 2019-07-29 DIAGNOSIS — Z99.3 DEPENDENCE ON WHEELCHAIR: ICD-10-CM

## 2019-07-29 DIAGNOSIS — R74.8 ABNORMAL LEVELS OF OTHER SERUM ENZYMES: ICD-10-CM

## 2019-07-29 PROCEDURE — 99204 OFFICE O/P NEW MOD 45 MIN: CPT

## 2019-07-29 NOTE — ASSESSMENT
[FreeTextEntry1] : Ms. 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 She has UTIs frequently and took antibiotics in December.\par - Mildly overweight\par - wheelchair bound, possible Parkinson's disease (seeing neurologist)\par - daughter does not want colon cancer screening for her moths\par - old age\par \par Plan:\par - labs as below\par - return in 2 weeks with CARMINE Triplett. Pt. will bring records from her PCP, including medications taken in December. If LFTs abnormal, will order further workup.

## 2019-07-29 NOTE — HISTORY OF PRESENT ILLNESS
[Fatigue] : fatigue stable [Fever] : denies fever [Diffuse Joint Pain (Arthralgias)] : denies arthralgias [Muscle Aches, Generalized (Myalgias)] : denies myalgias [Yellow Skin Or Eyes (Jaundice)] : denies jaundice [Abdominal Pain] : abdominal pain stable [Urine Tests Nonspecific Abnormal Findings Biliuria] : denies dark urine [Light Colored Bowel Movement (Acholic Stools)] : denies pale stools [Insomnia] : denies insomnia [Skin: Rash] : denies rash [Itching (Pruritus)] : denies pruritus [Shortness Of Breath] : denies shortness of breath [Needlestick Exposure] : no needlestick exposure [Infected Sexual Partner] : no infected sexual partner [IV Drug Use] : no IV drug use [Body Piercing] : no body piercing [Tattoo] : no tattoos [Hemodialysis] : no hemodialysis [Transfusion before 1992] : no transfusion before 1992 [Transplant before 1992] : no transplant before 1992 [Incarceration] : no incarceration [Alcohol Abuse] : no alcohol abuse [Autoimmune Disorder] : no autoimmune disorder [Travel to Endemic Area] : no travel to an endemic area [Household Contact to HBV] : no household contact to HBV [Occupational Exposure] : no occupational exposure [Cocaine Use] : no cocaine use [Wt Gain ___ Lbs] : no recent weight gain [Wt Loss ___ Lbs] : no recent weight loss [de-identified] : Ms. JOSHI is a 88 year old woman, Demario speaking, with HTN, complete heart block with PPM, wheelchair bound undergoing evaluation for possible Parkinson's disease, asthma, referred because of elevated liver enzymes in December of 2018, with improvement in January.\par 12/23/18: 130/131, 152\par 1/08/19: 84/108, 184\par 1/24/19: 25/23, 118\par She had recurrent UTIs and was treated with antibiotics, including in December 2018, last time in May. Antibiotics unknown.\par She has yellowish eyes occasionally. Daughter denies itching.\par  \par \par Weight hx: 135 lbs, BMI 26 as of 7/29/19. No h/o obesity.\par Alcohol hx: does not drink.

## 2019-07-29 NOTE — PHYSICAL EXAM
[General Appearance - Alert] : alert [General Appearance - In No Acute Distress] : in no acute distress [Sclera] : the sclera and conjunctiva were normal [PERRL With Normal Accommodation] : pupils were equal in size, round, and reactive to light [Extraocular Movements] : extraocular movements were intact [Outer Ear] : the ears and nose were normal in appearance [Oropharynx] : the oropharynx was normal [Neck Appearance] : the appearance of the neck was normal [Jugular Venous Distention Increased] : there was no jugular-venous distention [Neck Cervical Mass (___cm)] : no neck mass was observed [Thyroid Diffuse Enlargement] : the thyroid was not enlarged [Thyroid Nodule] : there were no palpable thyroid nodules [Heart Rate And Rhythm] : heart rate was normal and rhythm regular [Heart Sounds] : normal S1 and S2 [Heart Sounds Gallop] : no gallops [Murmurs] : no murmurs [Heart Sounds Pericardial Friction Rub] : no pericardial rub [Full Pulse] : the pedal pulses are present [Edema] : there was no peripheral edema [Bowel Sounds] : normal bowel sounds [Abdomen Soft] : soft [Abdomen Tenderness] : non-tender [Abdomen Mass (___ Cm)] : no abdominal mass palpated [Cervical Lymph Nodes Enlarged Posterior Bilaterally] : posterior cervical [Supraclavicular Lymph Nodes Enlarged Bilaterally] : supraclavicular [Cervical Lymph Nodes Enlarged Anterior Bilaterally] : anterior cervical [Axillary Lymph Nodes Enlarged Bilaterally] : axillary [Femoral Lymph Nodes Enlarged Bilaterally] : femoral [Abnormal Walk] : normal gait [Inguinal Lymph Nodes Enlarged Bilaterally] : inguinal [Nail Clubbing] : no clubbing  or cyanosis of the fingernails [Musculoskeletal - Swelling] : no joint swelling seen [Motor Tone] : muscle strength and tone were normal [Skin Color & Pigmentation] : normal skin color and pigmentation [Skin Turgor] : normal skin turgor [] : no rash [Deep Tendon Reflexes (DTR)] : deep tendon reflexes were 2+ and symmetric [Sensation] : the sensory exam was normal to light touch and pinprick [No Focal Deficits] : no focal deficits [No CVA Tenderness] : no ~M costovertebral angle tenderness [No Spinal Tenderness] : no spinal tenderness [Scleral Icterus] : No Scleral Icterus [Abdominal  Ascites] : no ascites [Splenomegaly] : no splenomegaly [Ascites Shifting Dullness] : no shifting dullness of ascites [Liver Palpable ___ Finger Breadths Below Costal Margin] : was not palpable below costal margin [Palmar Erythema] : no Palmar Erythema [Jaundice] : No jaundice [Depression] : no depression [FreeTextEntry7] : does not answer questions [FreeTextEntry1] : unable to evaluate

## 2019-07-29 NOTE — CONSULT LETTER
[Dear  ___] : Dear  [unfilled], [Consult Letter:] : I had the pleasure of evaluating your patient, [unfilled]. [Please see my note below.] : Please see my note below. [Consult Closing:] : Thank you very much for allowing me to participate in the care of this patient.  If you have any questions, please do not hesitate to contact me. [Sincerely,] : Sincerely, [FreeTextEntry2] : DES LEAL (DANNIELLE)\par  [FreeTextEntry3] : Ankit Sanchez MD\par , [facility]\par General Hepatology and Gastroenterology\par [facility]\par [facility]\par [address] [place] [address]\[place] [address]\par [phone]\par

## 2019-07-30 LAB
ALBUMIN SERPL ELPH-MCNC: 3.9 G/DL
ALP BLD-CCNC: 123 U/L
ALT SERPL-CCNC: 9 U/L
ANION GAP SERPL CALC-SCNC: 11 MMOL/L
AST SERPL-CCNC: 15 U/L
BASOPHILS # BLD AUTO: 0.04 K/UL
BASOPHILS NFR BLD AUTO: 0.4 %
BILIRUB SERPL-MCNC: 0.4 MG/DL
BUN SERPL-MCNC: 14 MG/DL
CALCIUM SERPL-MCNC: 9.3 MG/DL
CHLORIDE SERPL-SCNC: 100 MMOL/L
CO2 SERPL-SCNC: 22 MMOL/L
CREAT SERPL-MCNC: 0.96 MG/DL
EOSINOPHIL # BLD AUTO: 0.09 K/UL
EOSINOPHIL NFR BLD AUTO: 0.9 %
GLUCOSE SERPL-MCNC: 105 MG/DL
HBV CORE IGG+IGM SER QL: NONREACTIVE
HBV SURFACE AB SER QL: REACTIVE
HBV SURFACE AG SER QL: NONREACTIVE
HCT VFR BLD CALC: 42.8 %
HCV AB SER QL: NONREACTIVE
HCV S/CO RATIO: 0.2 S/CO
HEPATITIS A IGG ANTIBODY: REACTIVE
HGB BLD-MCNC: 13.1 G/DL
IMM GRANULOCYTES NFR BLD AUTO: 0.5 %
LYMPHOCYTES # BLD AUTO: 2.32 K/UL
LYMPHOCYTES NFR BLD AUTO: 24 %
MAN DIFF?: NORMAL
MCHC RBC-ENTMCNC: 27.6 PG
MCHC RBC-ENTMCNC: 30.6 GM/DL
MCV RBC AUTO: 90.3 FL
MONOCYTES # BLD AUTO: 0.78 K/UL
MONOCYTES NFR BLD AUTO: 8.1 %
NEUTROPHILS # BLD AUTO: 6.39 K/UL
NEUTROPHILS NFR BLD AUTO: 66.1 %
PLATELET # BLD AUTO: 282 K/UL
POTASSIUM SERPL-SCNC: 5.3 MMOL/L
PROT SERPL-MCNC: 7.9 G/DL
RBC # BLD: 4.74 M/UL
RBC # FLD: 15.1 %
SODIUM SERPL-SCNC: 133 MMOL/L
WBC # FLD AUTO: 9.67 K/UL

## 2019-08-06 ENCOUNTER — APPOINTMENT (OUTPATIENT)
Dept: GASTROENTEROLOGY | Facility: CLINIC | Age: 84
End: 2019-08-06

## 2019-08-13 ENCOUNTER — APPOINTMENT (OUTPATIENT)
Dept: HEPATOLOGY | Facility: CLINIC | Age: 84
End: 2019-08-13

## 2019-09-03 ENCOUNTER — APPOINTMENT (OUTPATIENT)
Dept: UROLOGY | Facility: CLINIC | Age: 84
End: 2019-09-03
Payer: MEDICAID

## 2019-09-03 VITALS — DIASTOLIC BLOOD PRESSURE: 85 MMHG | HEART RATE: 75 BPM | SYSTOLIC BLOOD PRESSURE: 134 MMHG | RESPIRATION RATE: 16 BRPM

## 2019-09-03 PROCEDURE — 99203 OFFICE O/P NEW LOW 30 MIN: CPT

## 2019-09-03 NOTE — PHYSICAL EXAM
[General Appearance - Well Developed] : well developed [General Appearance - Well Nourished] : well nourished [Normal Appearance] : normal appearance [Well Groomed] : well groomed [General Appearance - In No Acute Distress] : no acute distress [Edema] : no peripheral edema [Respiration, Rhythm And Depth] : normal respiratory rhythm and effort [Exaggerated Use Of Accessory Muscles For Inspiration] : no accessory muscle use [Abdomen Soft] : soft [Abdomen Tenderness] : non-tender [Costovertebral Angle Tenderness] : no ~M costovertebral angle tenderness [] : no rash [No Focal Deficits] : no focal deficits [Affect] : the affect was normal [Not Anxious] : not anxious [Urethral Meatus] : the meatus of the urethra showed no abnormalities [Urinary Bladder Findings] : the bladder was normal on palpation [External Female Genitalia] : normal external genitalia [FreeTextEntry1] : alert

## 2019-09-03 NOTE — HISTORY OF PRESENT ILLNESS
[FreeTextEntry1] : Patient is a 89 yo F who presents for recurrent, chronic bacteriuria.  She is referred by her PCP for evaluation.  History obtained from family - for past 3 months she has experienced cloudy urine and mild burning sensation.  No true dysuria.  No fever/chills, no flank pain.\par She had chronic bacteruria previously at least 1 year ago with culture positive Ecoli ESBL.  Per results, last cx in May showed ESBL E coli sens to cefepime, amox/clav, carbapenem, gentamycin, tetracycline.  Resistant to all other testes abx.  Prior cxs with similar Ecoli results/sensitivities.\par She was treated with 1 wk of abx 1 month ago, helped with her symptoms but upon cessation of abx her symptoms immediately recurred.\par Reports ?US normal in Jan.\par She is wheelchair bound and unable to walk for past 2 yrs.  Per family she voids in diaper for past 2 years.  She is not incontinent - family states she usually is able to vocalize her need to void and she is dry at night time.\par

## 2019-09-03 NOTE — ASSESSMENT
[FreeTextEntry1] : Patient is a 87 yo F who presents for chronic bacteriuria, mildly symptomatic.\par \par She has mild dysuria and cloudy urine, without significant symptoms or systemic complaints.\par Counseled family that she has a multi-drug resistant bacteriuria - since at least 1 year ago from 9/2018 that appears to same bacteria from May\par Straight cathed - urine cloudy and will send for cx.  Only 60cc drained - therefore not in retention as random cath.\par D/w pt/family that would observe at this time as treating with more abx would likely lead to increased resistance.  D/w pt/family if develops systemic signs/fevers or lethargy or other concerning signs/symptoms\par Will obtain renal/bladder sono\par F/u for sono\par

## 2019-09-06 ENCOUNTER — APPOINTMENT (OUTPATIENT)
Age: 84
End: 2019-09-06

## 2019-09-06 LAB — BACTERIA UR CULT: ABNORMAL

## 2019-10-01 ENCOUNTER — APPOINTMENT (OUTPATIENT)
Dept: UROLOGY | Facility: CLINIC | Age: 84
End: 2019-10-01
Payer: MEDICAID

## 2019-10-01 ENCOUNTER — OUTPATIENT (OUTPATIENT)
Dept: OUTPATIENT SERVICES | Facility: HOSPITAL | Age: 84
LOS: 1 days | End: 2019-10-01
Payer: MEDICAID

## 2019-10-01 DIAGNOSIS — R82.71 BACTERIURIA: ICD-10-CM

## 2019-10-01 DIAGNOSIS — R35.0 FREQUENCY OF MICTURITION: ICD-10-CM

## 2019-10-01 DIAGNOSIS — S72.91XA UNSPECIFIED FRACTURE OF RIGHT FEMUR, INITIAL ENCOUNTER FOR CLOSED FRACTURE: Chronic | ICD-10-CM

## 2019-10-01 PROCEDURE — 99213 OFFICE O/P EST LOW 20 MIN: CPT | Mod: 25

## 2019-10-01 PROCEDURE — 76775 US EXAM ABDO BACK WALL LIM: CPT | Mod: 26

## 2019-10-01 PROCEDURE — 76775 US EXAM ABDO BACK WALL LIM: CPT

## 2019-10-01 NOTE — ASSESSMENT
[FreeTextEntry1] : Patient is a 89 yo F who presents for chronic bacteriuria, mildly symptomatic.\par \par She has mild dysuria and cloudy urine, without significant symptoms or systemic complaints.\par Counseled family that she has a multi-drug resistant bacteriuria - since at least 1 year ago from 9/2018 that appears to same bacteria from May\par D/w pt/family that would observe at this time as treating with more abx would likely lead to increased resistance.  D/w pt/family if develops systemic signs/fevers or lethargy or other concerning signs/symptoms would initiate immediately abx, instructed family to bring pt to ER given her urine cx shows MDRO ecoli only sensitive to IV abx\par Sono today unremarkable\par F/u 1 yr or earlier prn\par

## 2019-10-01 NOTE — HISTORY OF PRESENT ILLNESS
[FreeTextEntry1] : Patient is a 87 yo F who presents for recurrent, chronic bacteriuria.  She is referred by her PCP for evaluation.  History obtained from family - for past 3 months she has experienced cloudy urine and mild burning sensation.  No true dysuria.  No fever/chills, no flank pain.\par She had chronic bacteruria previously at least 1 year ago with culture positive Ecoli ESBL.  Per results, last cx in May showed ESBL E coli sens to cefepime, amox/clav, carbapenem, gentamycin, tetracycline.  Resistant to all other testes abx.  Prior cxs with similar Ecoli results/sensitivities.\par She was treated with 1 wk of abx 1 month ago, helped with her symptoms but upon cessation of abx her symptoms immediately recurred.\par Reports ?US normal in Jan.\par She is wheelchair bound and unable to walk for past 2 yrs.  Per family she voids in diaper for past 2 years.  She is not incontinent - family states she usually is able to vocalize her need to void and she is dry at night time.\par \par Interval hx:\par No dysuria, SP/flank pain.  No fever/chills.  Still with intermittent cloudy urine.  Urine cx previously showed MDRO Ecoli.\par Renal sono today showed no stones or hydro.

## 2019-10-01 NOTE — PHYSICAL EXAM
[General Appearance - Well Developed] : well developed [General Appearance - Well Nourished] : well nourished [Normal Appearance] : normal appearance [Well Groomed] : well groomed [General Appearance - In No Acute Distress] : no acute distress [] : no respiratory distress [Respiration, Rhythm And Depth] : normal respiratory rhythm and effort [Exaggerated Use Of Accessory Muscles For Inspiration] : no accessory muscle use [FreeTextEntry1] : wheelchair bound

## 2019-10-02 DIAGNOSIS — R82.71 BACTERIURIA: ICD-10-CM

## 2019-10-08 ENCOUNTER — APPOINTMENT (OUTPATIENT)
Dept: ELECTROPHYSIOLOGY | Facility: CLINIC | Age: 84
End: 2019-10-08
Payer: MEDICAID

## 2019-10-08 PROCEDURE — 93280 PM DEVICE PROGR EVAL DUAL: CPT

## 2020-07-07 ENCOUNTER — APPOINTMENT (OUTPATIENT)
Dept: ELECTROPHYSIOLOGY | Facility: CLINIC | Age: 85
End: 2020-07-07
Payer: MEDICAID

## 2020-07-07 PROCEDURE — 93280 PM DEVICE PROGR EVAL DUAL: CPT

## 2020-09-29 ENCOUNTER — APPOINTMENT (OUTPATIENT)
Dept: NEUROLOGY | Facility: CLINIC | Age: 85
End: 2020-09-29

## 2020-09-29 ENCOUNTER — INPATIENT (INPATIENT)
Facility: HOSPITAL | Age: 85
LOS: 5 days | Discharge: HOME HEALTH | End: 2020-10-05
Attending: HOSPITALIST | Admitting: HOSPITALIST
Payer: MEDICAID

## 2020-09-29 VITALS
DIASTOLIC BLOOD PRESSURE: 70 MMHG | SYSTOLIC BLOOD PRESSURE: 109 MMHG | OXYGEN SATURATION: 98 % | RESPIRATION RATE: 37 BRPM | TEMPERATURE: 96 F | HEART RATE: 146 BPM | HEIGHT: 60 IN

## 2020-09-29 VITALS — SYSTOLIC BLOOD PRESSURE: 148 MMHG | HEART RATE: 97 BPM | HEIGHT: 60 IN | DIASTOLIC BLOOD PRESSURE: 92 MMHG

## 2020-09-29 VITALS — TEMPERATURE: 97 F

## 2020-09-29 DIAGNOSIS — S72.91XA UNSPECIFIED FRACTURE OF RIGHT FEMUR, INITIAL ENCOUNTER FOR CLOSED FRACTURE: Chronic | ICD-10-CM

## 2020-09-29 DIAGNOSIS — J45.909 UNSPECIFIED ASTHMA, UNCOMPLICATED: ICD-10-CM

## 2020-09-29 DIAGNOSIS — A41.9 SEPSIS, UNSPECIFIED ORGANISM: ICD-10-CM

## 2020-09-29 DIAGNOSIS — I10 ESSENTIAL (PRIMARY) HYPERTENSION: ICD-10-CM

## 2020-09-29 DIAGNOSIS — R41.82 ALTERED MENTAL STATUS, UNSPECIFIED: ICD-10-CM

## 2020-09-29 DIAGNOSIS — G20 PARKINSON'S DISEASE: ICD-10-CM

## 2020-09-29 DIAGNOSIS — G93.41 METABOLIC ENCEPHALOPATHY: ICD-10-CM

## 2020-09-29 DIAGNOSIS — Z02.9 ENCOUNTER FOR ADMINISTRATIVE EXAMINATIONS, UNSPECIFIED: ICD-10-CM

## 2020-09-29 LAB
ALBUMIN SERPL ELPH-MCNC: 3.7 G/DL — SIGNIFICANT CHANGE UP (ref 3.3–5)
ALP SERPL-CCNC: 109 U/L — SIGNIFICANT CHANGE UP (ref 40–120)
ALT FLD-CCNC: 38 U/L — HIGH (ref 4–33)
ANION GAP SERPL CALC-SCNC: 15 MMO/L — HIGH (ref 7–14)
APPEARANCE UR: SIGNIFICANT CHANGE UP
APTT BLD: 25.3 SEC — LOW (ref 27–36.3)
AST SERPL-CCNC: 58 U/L — HIGH (ref 4–32)
B PERT DNA SPEC QL NAA+PROBE: SIGNIFICANT CHANGE UP
BACTERIA # UR AUTO: HIGH
BASE EXCESS BLDV CALC-SCNC: -2.4 MMOL/L — SIGNIFICANT CHANGE UP
BASOPHILS # BLD AUTO: 0.04 K/UL — SIGNIFICANT CHANGE UP (ref 0–0.2)
BASOPHILS NFR BLD AUTO: 0.5 % — SIGNIFICANT CHANGE UP (ref 0–2)
BILIRUB SERPL-MCNC: 0.4 MG/DL — SIGNIFICANT CHANGE UP (ref 0.2–1.2)
BILIRUB UR-MCNC: NEGATIVE — SIGNIFICANT CHANGE UP
BLOOD GAS VENOUS - CREATININE: 1.35 MG/DL — HIGH (ref 0.5–1.3)
BLOOD UR QL VISUAL: HIGH
BUN SERPL-MCNC: 23 MG/DL — SIGNIFICANT CHANGE UP (ref 7–23)
C PNEUM DNA SPEC QL NAA+PROBE: SIGNIFICANT CHANGE UP
CALCIUM SERPL-MCNC: 9.4 MG/DL — SIGNIFICANT CHANGE UP (ref 8.4–10.5)
CHLORIDE BLDV-SCNC: 104 MMOL/L — SIGNIFICANT CHANGE UP (ref 96–108)
CHLORIDE SERPL-SCNC: 99 MMOL/L — SIGNIFICANT CHANGE UP (ref 98–107)
CO2 SERPL-SCNC: 20 MMOL/L — LOW (ref 22–31)
COLOR SPEC: SIGNIFICANT CHANGE UP
CREAT SERPL-MCNC: 1.15 MG/DL — SIGNIFICANT CHANGE UP (ref 0.5–1.3)
EOSINOPHIL # BLD AUTO: 0.04 K/UL — SIGNIFICANT CHANGE UP (ref 0–0.5)
EOSINOPHIL NFR BLD AUTO: 0.5 % — SIGNIFICANT CHANGE UP (ref 0–6)
FLUAV H1 2009 PAND RNA SPEC QL NAA+PROBE: SIGNIFICANT CHANGE UP
FLUAV H1 RNA SPEC QL NAA+PROBE: SIGNIFICANT CHANGE UP
FLUAV H3 RNA SPEC QL NAA+PROBE: SIGNIFICANT CHANGE UP
FLUAV SUBTYP SPEC NAA+PROBE: SIGNIFICANT CHANGE UP
FLUBV RNA SPEC QL NAA+PROBE: SIGNIFICANT CHANGE UP
GAS PNL BLDV: 134 MMOL/L — LOW (ref 136–146)
GLUCOSE BLDV-MCNC: 116 MG/DL — HIGH (ref 70–99)
GLUCOSE SERPL-MCNC: 115 MG/DL — HIGH (ref 70–99)
GLUCOSE UR-MCNC: NEGATIVE — SIGNIFICANT CHANGE UP
HADV DNA SPEC QL NAA+PROBE: SIGNIFICANT CHANGE UP
HCO3 BLDV-SCNC: 22 MMOL/L — SIGNIFICANT CHANGE UP (ref 20–27)
HCOV PNL SPEC NAA+PROBE: SIGNIFICANT CHANGE UP
HCT VFR BLD CALC: 38.4 % — SIGNIFICANT CHANGE UP (ref 34.5–45)
HCT VFR BLDV CALC: 36.6 % — SIGNIFICANT CHANGE UP (ref 34.5–45)
HGB BLD-MCNC: 12.3 G/DL — SIGNIFICANT CHANGE UP (ref 11.5–15.5)
HGB BLDV-MCNC: 11.9 G/DL — SIGNIFICANT CHANGE UP (ref 11.5–15.5)
HMPV RNA SPEC QL NAA+PROBE: SIGNIFICANT CHANGE UP
HPIV1 RNA SPEC QL NAA+PROBE: SIGNIFICANT CHANGE UP
HPIV2 RNA SPEC QL NAA+PROBE: SIGNIFICANT CHANGE UP
HPIV3 RNA SPEC QL NAA+PROBE: SIGNIFICANT CHANGE UP
HPIV4 RNA SPEC QL NAA+PROBE: SIGNIFICANT CHANGE UP
HYALINE CASTS # UR AUTO: HIGH
IMM GRANULOCYTES NFR BLD AUTO: 0.7 % — SIGNIFICANT CHANGE UP (ref 0–1.5)
INR BLD: 1.15 — SIGNIFICANT CHANGE UP (ref 0.88–1.16)
KETONES UR-MCNC: NEGATIVE — SIGNIFICANT CHANGE UP
LACTATE BLDV-MCNC: 1.9 MMOL/L — SIGNIFICANT CHANGE UP (ref 0.5–2)
LEUKOCYTE ESTERASE UR-ACNC: SIGNIFICANT CHANGE UP
LYMPHOCYTES # BLD AUTO: 1.74 K/UL — SIGNIFICANT CHANGE UP (ref 1–3.3)
LYMPHOCYTES # BLD AUTO: 22.8 % — SIGNIFICANT CHANGE UP (ref 13–44)
MAGNESIUM SERPL-MCNC: 1.8 MG/DL — SIGNIFICANT CHANGE UP (ref 1.6–2.6)
MAGNESIUM SERPL-MCNC: 1.9 MG/DL — SIGNIFICANT CHANGE UP (ref 1.6–2.6)
MCHC RBC-ENTMCNC: 26.8 PG — LOW (ref 27–34)
MCHC RBC-ENTMCNC: 32 % — SIGNIFICANT CHANGE UP (ref 32–36)
MCV RBC AUTO: 83.7 FL — SIGNIFICANT CHANGE UP (ref 80–100)
MONOCYTES # BLD AUTO: 0.63 K/UL — SIGNIFICANT CHANGE UP (ref 0–0.9)
MONOCYTES NFR BLD AUTO: 8.3 % — SIGNIFICANT CHANGE UP (ref 2–14)
NEUTROPHILS # BLD AUTO: 5.12 K/UL — SIGNIFICANT CHANGE UP (ref 1.8–7.4)
NEUTROPHILS NFR BLD AUTO: 67.2 % — SIGNIFICANT CHANGE UP (ref 43–77)
NITRITE UR-MCNC: NEGATIVE — SIGNIFICANT CHANGE UP
NRBC # FLD: 0 K/UL — SIGNIFICANT CHANGE UP (ref 0–0)
PCO2 BLDV: 38 MMHG — LOW (ref 41–51)
PH BLDV: 7.38 PH — SIGNIFICANT CHANGE UP (ref 7.32–7.43)
PH UR: 6.5 — SIGNIFICANT CHANGE UP (ref 5–8)
PHOSPHATE SERPL-MCNC: 4 MG/DL — SIGNIFICANT CHANGE UP (ref 2.5–4.5)
PHOSPHATE SERPL-MCNC: 4.2 MG/DL — SIGNIFICANT CHANGE UP (ref 2.5–4.5)
PLATELET # BLD AUTO: 289 K/UL — SIGNIFICANT CHANGE UP (ref 150–400)
PMV BLD: 9 FL — SIGNIFICANT CHANGE UP (ref 7–13)
PO2 BLDV: 53 MMHG — HIGH (ref 35–40)
POTASSIUM BLDV-SCNC: 4.8 MMOL/L — HIGH (ref 3.4–4.5)
POTASSIUM SERPL-MCNC: 5.5 MMOL/L — HIGH (ref 3.5–5.3)
POTASSIUM SERPL-SCNC: 5.5 MMOL/L — HIGH (ref 3.5–5.3)
PROT SERPL-MCNC: 7 G/DL — SIGNIFICANT CHANGE UP (ref 6–8.3)
PROT UR-MCNC: 70 — SIGNIFICANT CHANGE UP
PROTHROM AB SERPL-ACNC: 13 SEC — SIGNIFICANT CHANGE UP (ref 10.6–13.6)
RAPID RVP RESULT: SIGNIFICANT CHANGE UP
RBC # BLD: 4.59 M/UL — SIGNIFICANT CHANGE UP (ref 3.8–5.2)
RBC # FLD: 18 % — HIGH (ref 10.3–14.5)
RBC CASTS # UR COMP ASSIST: HIGH (ref 0–?)
RSV RNA SPEC QL NAA+PROBE: SIGNIFICANT CHANGE UP
RV+EV RNA SPEC QL NAA+PROBE: SIGNIFICANT CHANGE UP
SAO2 % BLDV: 82.7 % — SIGNIFICANT CHANGE UP (ref 60–85)
SARS-COV-2 RNA SPEC QL NAA+PROBE: SIGNIFICANT CHANGE UP
SODIUM SERPL-SCNC: 134 MMOL/L — LOW (ref 135–145)
SP GR SPEC: 1.02 — SIGNIFICANT CHANGE UP (ref 1–1.04)
SQUAMOUS # UR AUTO: SIGNIFICANT CHANGE UP
TROPONIN T, HIGH SENSITIVITY: 48 NG/L — SIGNIFICANT CHANGE UP (ref ?–14)
TSH SERPL-MCNC: 4.17 UIU/ML — SIGNIFICANT CHANGE UP (ref 0.27–4.2)
UROBILINOGEN FLD QL: SIGNIFICANT CHANGE UP
WBC # BLD: 7.62 K/UL — SIGNIFICANT CHANGE UP (ref 3.8–10.5)
WBC # FLD AUTO: 7.62 K/UL — SIGNIFICANT CHANGE UP (ref 3.8–10.5)
WBC UR QL: >50 — HIGH (ref 0–?)

## 2020-09-29 PROCEDURE — 71045 X-RAY EXAM CHEST 1 VIEW: CPT | Mod: 26

## 2020-09-29 PROCEDURE — 99223 1ST HOSP IP/OBS HIGH 75: CPT

## 2020-09-29 PROCEDURE — 99285 EMERGENCY DEPT VISIT HI MDM: CPT

## 2020-09-29 RX ORDER — BUDESONIDE AND FORMOTEROL FUMARATE DIHYDRATE 160; 4.5 UG/1; UG/1
2 AEROSOL RESPIRATORY (INHALATION)
Refills: 0 | Status: DISCONTINUED | OUTPATIENT
Start: 2020-09-29 | End: 2020-10-02

## 2020-09-29 RX ORDER — ALBUTEROL 90 UG/1
2.5 AEROSOL, METERED ORAL EVERY 6 HOURS
Refills: 0 | Status: DISCONTINUED | OUTPATIENT
Start: 2020-09-29 | End: 2020-10-01

## 2020-09-29 RX ORDER — PIPERACILLIN AND TAZOBACTAM 4; .5 G/20ML; G/20ML
3.38 INJECTION, POWDER, LYOPHILIZED, FOR SOLUTION INTRAVENOUS ONCE
Refills: 0 | Status: COMPLETED | OUTPATIENT
Start: 2020-09-29 | End: 2020-09-29

## 2020-09-29 RX ORDER — VANCOMYCIN HCL 1 G
1000 VIAL (EA) INTRAVENOUS ONCE
Refills: 0 | Status: COMPLETED | OUTPATIENT
Start: 2020-09-29 | End: 2020-09-29

## 2020-09-29 RX ORDER — HEPARIN SODIUM 5000 [USP'U]/ML
5000 INJECTION INTRAVENOUS; SUBCUTANEOUS EVERY 12 HOURS
Refills: 0 | Status: DISCONTINUED | OUTPATIENT
Start: 2020-09-29 | End: 2020-10-01

## 2020-09-29 RX ORDER — SODIUM CHLORIDE 9 MG/ML
1000 INJECTION, SOLUTION INTRAVENOUS ONCE
Refills: 0 | Status: COMPLETED | OUTPATIENT
Start: 2020-09-29 | End: 2020-09-29

## 2020-09-29 RX ORDER — VANCOMYCIN HCL 1 G
1000 VIAL (EA) INTRAVENOUS DAILY
Refills: 0 | Status: DISCONTINUED | OUTPATIENT
Start: 2020-09-29 | End: 2020-10-01

## 2020-09-29 RX ORDER — ALBUTEROL 90 UG/1
1 AEROSOL, METERED ORAL EVERY 4 HOURS
Refills: 0 | Status: DISCONTINUED | OUTPATIENT
Start: 2020-09-29 | End: 2020-10-02

## 2020-09-29 RX ORDER — PIPERACILLIN AND TAZOBACTAM 4; .5 G/20ML; G/20ML
3.38 INJECTION, POWDER, LYOPHILIZED, FOR SOLUTION INTRAVENOUS EVERY 8 HOURS
Refills: 0 | Status: DISCONTINUED | OUTPATIENT
Start: 2020-09-30 | End: 2020-10-02

## 2020-09-29 RX ORDER — LEVOTHYROXINE SODIUM 125 MCG
25 TABLET ORAL DAILY
Refills: 0 | Status: DISCONTINUED | OUTPATIENT
Start: 2020-09-29 | End: 2020-10-01

## 2020-09-29 RX ORDER — CEFEPIME 1 G/1
1000 INJECTION, POWDER, FOR SOLUTION INTRAMUSCULAR; INTRAVENOUS ONCE
Refills: 0 | Status: COMPLETED | OUTPATIENT
Start: 2020-09-29 | End: 2020-09-29

## 2020-09-29 RX ADMIN — CEFEPIME 1000 MILLIGRAM(S): 1 INJECTION, POWDER, FOR SOLUTION INTRAMUSCULAR; INTRAVENOUS at 20:43

## 2020-09-29 RX ADMIN — PIPERACILLIN AND TAZOBACTAM 200 GRAM(S): 4; .5 INJECTION, POWDER, LYOPHILIZED, FOR SOLUTION INTRAVENOUS at 18:22

## 2020-09-29 RX ADMIN — SODIUM CHLORIDE 1000 MILLILITER(S): 9 INJECTION, SOLUTION INTRAVENOUS at 19:00

## 2020-09-29 RX ADMIN — PIPERACILLIN AND TAZOBACTAM 3.38 GRAM(S): 4; .5 INJECTION, POWDER, LYOPHILIZED, FOR SOLUTION INTRAVENOUS at 18:30

## 2020-09-29 RX ADMIN — Medication 250 MILLIGRAM(S): at 19:06

## 2020-09-29 RX ADMIN — CEFEPIME 100 MILLIGRAM(S): 1 INJECTION, POWDER, FOR SOLUTION INTRAMUSCULAR; INTRAVENOUS at 20:13

## 2020-09-29 RX ADMIN — Medication 1000 MILLIGRAM(S): at 20:06

## 2020-09-29 RX ADMIN — SODIUM CHLORIDE 1000 MILLILITER(S): 9 INJECTION, SOLUTION INTRAVENOUS at 18:22

## 2020-09-29 NOTE — ED ADULT TRIAGE NOTE - CHIEF COMPLAINT QUOTE
arrives as notification for rule out sepsis from doctor's office. pt arrives diaphoretic and with rigors. tachycardic and tachypneic. brought directly to room 5.

## 2020-09-29 NOTE — ED PROVIDER NOTE - PHYSICAL EXAMINATION
GENERAL: toxic appearing, rigoring  HEAD: atraumatic, normocephalic  EYES: vision grossly intact, no conjunctivitis or discharge  EARS: hearing grossly intact  NOSE: no nasal discharge, epistaxis   CARDIAC: tachycardic, normotensive  PULM: tachypnea, increased WOB  GI: abdomen nondistended, soft, nontender, no guarding or rebound tenderness, no palpable masses  NEURO: altered  MSK: spine appears normal, no joint swelling or erythema, no gross deformities of extremities  EXT: no peripheral edema, calf tenderness, redness or swelling  SKIN: warm, dry, and intact, no rashes  PSYCH: appropriate mood and affect

## 2020-09-29 NOTE — ED ADULT NURSE NOTE - OBJECTIVE STATEMENT
Pt. alert and verbal, Demario/Baptism speaking. Spoke with son on phone, was told pt. has had more tremors and difficulty breathing at home. Pt. placed in Exam room 5, tachypneic with RR of 28, respirations even and labored, paced on monitor with HR of 98, abdomen nontender and nondistended, skin intact. 20G IV obtained on R forearm using aseptic technique, flushing without resistance, dressing dry and intact. Pt. placed on cardiac monitoring, will medicate as ordered. Safety precautions maintained.

## 2020-09-29 NOTE — ED PROVIDER NOTE - CLINICAL SUMMARY MEDICAL DECISION MAKING FREE TEXT BOX
89F full code, hx of hypothyroidism, paroxysmal afib, recurrent UTI, PD, wheelchair bound, PPM presenting with 1 week of AMS, rigors, tachypnea. On exam, toxic appearing, tachycardic, tachpneic, normotensive, satting well on RA, abdomen soft, non-ttp. Will check sepsis labs, TSH, give antibiotics, fluids, TBA. Patient is full code.

## 2020-09-29 NOTE — ED PROVIDER NOTE - OBJECTIVE STATEMENT
89F full code, hx of hypothyroidism, paroxysmal afib, recurrent UTI, PD, wheelchair bound, PPM presenting with 1 week of AMS, rigors, tachypnea. Patient is full code. 89F full code, hx of hypothyroidism, paroxysmal afib, recurrent UTI, PD, wheelchair bound, PPM presenting with 1 week of AMS, rigors, tachypnea. Patient is full code.    PMD: Dr. Dez Bennett

## 2020-09-29 NOTE — H&P ADULT - PROBLEM SELECTOR PLAN 6
Transitions of Care Status:  1.  Name of PCP:  Dr Bennett  2.  PCP Contacted on Admission: [ ] Y    [x] N    3.  PCP contacted at Discharge: [ ] Y    [ ] N    [ ] N/A  4.  Post-Discharge Appointment Date and Location:  5.  Summary of Handoff given to PCP:

## 2020-09-29 NOTE — ED ADULT TRIAGE NOTE - BP NONINVASIVE SYSTOLIC (MM HG)
17:54= Daughter, Eun Shaffer, wants to be updated throughout surgery. She can be reached at: [phone] (C) or [phone] (H). 109

## 2020-09-29 NOTE — H&P ADULT - NSHPPHYSICALEXAM_GEN_ALL_CORE
Vital Signs Last 24 Hrs  T(C): 37.3 (29 Sep 2020 18:24), Max: 37.3 (29 Sep 2020 18:24)  T(F): 99.1 (29 Sep 2020 18:24), Max: 99.1 (29 Sep 2020 18:24)  HR: 87 (29 Sep 2020 20:45) (81 - 146)  BP: 138/92 (29 Sep 2020 20:45) (109/70 - 161/101)  BP(mean): 99 (29 Sep 2020 19:16) (99 - 99)  RR: 24 (29 Sep 2020 20:45) (23 - 37)  SpO2: 100% (29 Sep 2020 20:45) (98% - 100%)    PHYSICAL EXAM:  GENERAL: rigors  EYES: conjunctiva and sclera clear  ENMT: Moist mucous membranes   NECK: Supple  PULMONARY: mild wheeze diffusely   CARDIAC: Regular rate and rhythm; No murmurs, rubs, or gallops  GASTROINTESTINAL: Abdomen soft, Nontender, Nondistended; Bowel sounds normal  EXTREMITIES:   No clubbing, cyanosis, or pedal edema  NEUROLOGY:   - Mental status lethargic  SKIN: No rashes or lesions  MUSCULOSKELETAL: No joint swelling

## 2020-09-29 NOTE — PHARMACOTHERAPY INTERVENTION NOTE - COMMENTS
Medication history is incomplete. Unable to verify patient's medication list with two sources. Discrepancies noted in provider handoff. Please call spectra [phone] if you have any questions.

## 2020-09-29 NOTE — H&P ADULT - NSHPLABSRESULTS_GEN_ALL_CORE
134<L>  |  99  |  23  ----------------------------<  115<H>  5.5<H>   |  20<L>  |  1.15    Ca    9.4      29 Sep 2020 18:20  Phos  4.2       Mg     1.8         TPro  7.0  /  Alb  3.7  /  TBili  0.4  /  DBili  x   /  AST  58<H>  /  ALT  38<H>  /  AlkPhos  109                              12.3   7.62  )-----------( 289      ( 29 Sep 2020 17:22 )             38.4             PT/INR - ( 29 Sep 2020 17:22 )   PT: 13.0 SEC;   INR: 1.15          PTT - ( 29 Sep 2020 17:22 )  PTT:25.3 SEC    Urinalysis Basic - ( 29 Sep 2020 17:57 )    Color: DARK YELLOW / Appearance: TURBID / S.020 / pH: 6.5  Gluc: NEGATIVE / Ketone: NEGATIVE  / Bili: NEGATIVE / Urobili: TRACE   Blood: LARGE / Protein: 70 / Nitrite: NEGATIVE   Leuk Esterase: LARGE / RBC: 26-50 / WBC >50   Sq Epi: FEW / Non Sq Epi: x / Bacteria: MANY    CXR film reviewed: Clear lungs    EKG tracing reviewed: artifact 2/2 tremor, difficult to determine rhythm

## 2020-09-29 NOTE — H&P ADULT - ASSESSMENT
88 y/o F with hypothyroidism, AF, CHB s/p PPM, Parkinson disease, asthma, and recurrent UTI's p/w rigors, tachypnea, and lethargy 2/2 sepsis from UTI.

## 2020-09-29 NOTE — H&P ADULT - HISTORY OF PRESENT ILLNESS
89 y/o F with hypothyroidism, AF, CHB s/p PPM, Parkinson disease, asthma, and recurrent UTI's p/w rigors.  Pt's daughter reports that for the past 5 days, pt has had worsening lethargy, and over the past 2 days developed rigors.  Pt also reported to daughter that she was having abdominal pain.  Daughter also states that pt developed pus in urine yesterday.  Daughter states that pt had no fevers at home. Pt presented for neurology appointment today, and was sent to the ED via EMS before appointment 2/2 symptoms and toxic appearance.      In the ED, pt had hypothermia to 95.6, tachycardia to 140's and tachypnea to 30's.  She had positive UA, and negative RVP including COVID.  She was given, vancomycin, cefepime, and Zosyn, and 1L LR.

## 2020-09-29 NOTE — H&P ADULT - NSICDXFAMILYHX_GEN_ALL_CORE_FT
FAMILY HISTORY:  Sibling  Still living? Unknown  Family history of asthma, Age at diagnosis: Age Unknown

## 2020-09-29 NOTE — H&P ADULT - NSICDXPASTMEDICALHX_GEN_ALL_CORE_FT
PAST MEDICAL HISTORY:  Asthma     Hypertension     Pacemaker 2/2 3rd degree heart block, placed in 3/2016, [place] PPM

## 2020-09-29 NOTE — ED PROVIDER NOTE - ATTENDING CONTRIBUTION TO CARE
Dr. Grant:  I have personally performed a face to face bedside history and physical examination of this patient. I have discussed the history, examination, review of systems, assessment and plan of management with the resident. I have reviewed the electronic medical record and amended it to reflect my history, review of systems, physical exam, assessment and plan.    89F h/o hypothyroidism, paroxysmal afib, recurrent UTIs (MDR E coli), parkinson's disease, HTN, s/p PPM, presents with one week of episodes tremors/shaking episodes and altered mental status (decreased verbal interaction).  Unable to obtain ROS secondary to altered mental status.     Exam:  - shaking, ?rigoring  - tachy  - ctab  - abd soft     A/P  - altered, shaking/rigoring, suspect UTI vs other infectious process  - sepsis panel, cxr, urine culture

## 2020-09-30 LAB
ANION GAP SERPL CALC-SCNC: 12 MMO/L — SIGNIFICANT CHANGE UP (ref 7–14)
BASOPHILS # BLD AUTO: 0.03 K/UL — SIGNIFICANT CHANGE UP (ref 0–0.2)
BASOPHILS NFR BLD AUTO: 0.4 % — SIGNIFICANT CHANGE UP (ref 0–2)
BUN SERPL-MCNC: 17 MG/DL — SIGNIFICANT CHANGE UP (ref 7–23)
CALCIUM SERPL-MCNC: 9 MG/DL — SIGNIFICANT CHANGE UP (ref 8.4–10.5)
CHLORIDE SERPL-SCNC: 99 MMOL/L — SIGNIFICANT CHANGE UP (ref 98–107)
CO2 SERPL-SCNC: 22 MMOL/L — SIGNIFICANT CHANGE UP (ref 22–31)
CREAT SERPL-MCNC: 1.02 MG/DL — SIGNIFICANT CHANGE UP (ref 0.5–1.3)
EOSINOPHIL # BLD AUTO: 0.05 K/UL — SIGNIFICANT CHANGE UP (ref 0–0.5)
EOSINOPHIL NFR BLD AUTO: 0.7 % — SIGNIFICANT CHANGE UP (ref 0–6)
GLUCOSE SERPL-MCNC: 106 MG/DL — HIGH (ref 70–99)
HCT VFR BLD CALC: 33.1 % — LOW (ref 34.5–45)
HGB BLD-MCNC: 10.6 G/DL — LOW (ref 11.5–15.5)
IMM GRANULOCYTES NFR BLD AUTO: 0.3 % — SIGNIFICANT CHANGE UP (ref 0–1.5)
LYMPHOCYTES # BLD AUTO: 2.08 K/UL — SIGNIFICANT CHANGE UP (ref 1–3.3)
LYMPHOCYTES # BLD AUTO: 29.5 % — SIGNIFICANT CHANGE UP (ref 13–44)
MAGNESIUM SERPL-MCNC: 1.8 MG/DL — SIGNIFICANT CHANGE UP (ref 1.6–2.6)
MCHC RBC-ENTMCNC: 27.2 PG — SIGNIFICANT CHANGE UP (ref 27–34)
MCHC RBC-ENTMCNC: 32 % — SIGNIFICANT CHANGE UP (ref 32–36)
MCV RBC AUTO: 85.1 FL — SIGNIFICANT CHANGE UP (ref 80–100)
MONOCYTES # BLD AUTO: 0.66 K/UL — SIGNIFICANT CHANGE UP (ref 0–0.9)
MONOCYTES NFR BLD AUTO: 9.4 % — SIGNIFICANT CHANGE UP (ref 2–14)
NEUTROPHILS # BLD AUTO: 4.21 K/UL — SIGNIFICANT CHANGE UP (ref 1.8–7.4)
NEUTROPHILS NFR BLD AUTO: 59.7 % — SIGNIFICANT CHANGE UP (ref 43–77)
NRBC # FLD: 0 K/UL — SIGNIFICANT CHANGE UP (ref 0–0)
PHOSPHATE SERPL-MCNC: 3.4 MG/DL — SIGNIFICANT CHANGE UP (ref 2.5–4.5)
PLATELET # BLD AUTO: 246 K/UL — SIGNIFICANT CHANGE UP (ref 150–400)
PMV BLD: 9.2 FL — SIGNIFICANT CHANGE UP (ref 7–13)
POTASSIUM SERPL-MCNC: 4.3 MMOL/L — SIGNIFICANT CHANGE UP (ref 3.5–5.3)
POTASSIUM SERPL-SCNC: 4.3 MMOL/L — SIGNIFICANT CHANGE UP (ref 3.5–5.3)
RBC # BLD: 3.89 M/UL — SIGNIFICANT CHANGE UP (ref 3.8–5.2)
RBC # FLD: 17.9 % — HIGH (ref 10.3–14.5)
SODIUM SERPL-SCNC: 133 MMOL/L — LOW (ref 135–145)
WBC # BLD: 7.05 K/UL — SIGNIFICANT CHANGE UP (ref 3.8–10.5)
WBC # FLD AUTO: 7.05 K/UL — SIGNIFICANT CHANGE UP (ref 3.8–10.5)

## 2020-09-30 PROCEDURE — 99233 SBSQ HOSP IP/OBS HIGH 50: CPT | Mod: GC

## 2020-09-30 PROCEDURE — 70450 CT HEAD/BRAIN W/O DYE: CPT | Mod: 26

## 2020-09-30 RX ORDER — FLUTICASONE PROPIONATE AND SALMETEROL 50; 250 UG/1; UG/1
1 POWDER ORAL; RESPIRATORY (INHALATION)
Qty: 0 | Refills: 0 | DISCHARGE

## 2020-09-30 RX ORDER — LEVOTHYROXINE SODIUM 125 MCG
1 TABLET ORAL
Qty: 0 | Refills: 0 | DISCHARGE

## 2020-09-30 RX ORDER — FLUTICASONE PROPIONATE 50 MCG
1 SPRAY, SUSPENSION NASAL
Qty: 0 | Refills: 0 | DISCHARGE

## 2020-09-30 RX ADMIN — HEPARIN SODIUM 5000 UNIT(S): 5000 INJECTION INTRAVENOUS; SUBCUTANEOUS at 19:01

## 2020-09-30 RX ADMIN — ALBUTEROL 2.5 MILLIGRAM(S): 90 AEROSOL, METERED ORAL at 17:02

## 2020-09-30 RX ADMIN — Medication 250 MILLIGRAM(S): at 19:01

## 2020-09-30 RX ADMIN — ALBUTEROL 2.5 MILLIGRAM(S): 90 AEROSOL, METERED ORAL at 22:31

## 2020-09-30 RX ADMIN — PIPERACILLIN AND TAZOBACTAM 25 GRAM(S): 4; .5 INJECTION, POWDER, LYOPHILIZED, FOR SOLUTION INTRAVENOUS at 21:45

## 2020-09-30 RX ADMIN — PIPERACILLIN AND TAZOBACTAM 25 GRAM(S): 4; .5 INJECTION, POWDER, LYOPHILIZED, FOR SOLUTION INTRAVENOUS at 14:35

## 2020-09-30 RX ADMIN — HEPARIN SODIUM 5000 UNIT(S): 5000 INJECTION INTRAVENOUS; SUBCUTANEOUS at 05:19

## 2020-09-30 RX ADMIN — PIPERACILLIN AND TAZOBACTAM 25 GRAM(S): 4; .5 INJECTION, POWDER, LYOPHILIZED, FOR SOLUTION INTRAVENOUS at 05:19

## 2020-09-30 RX ADMIN — ALBUTEROL 2.5 MILLIGRAM(S): 90 AEROSOL, METERED ORAL at 08:58

## 2020-09-30 NOTE — SWALLOW BEDSIDE ASSESSMENT ADULT - SWALLOW EVAL: DIAGNOSIS
Patient presented with Honey Thick Liquid via Teaspoon presentation; However Patient makes no attempt to retrieve/strip from utensil despite verbal/tactile cues placed onto the lips and anterior oral cavity exacerbated by patient's current reduced mentation state. Therefore the Oral and Pharyngeal Stage could not be adequately assess to determine a safe oral diet at this time.

## 2020-09-30 NOTE — PROVIDER CONTACT NOTE (OTHER) - SITUATION
While daughter was feeding patient juice, patient observed coughing and clearing throat. Feeding stopped.

## 2020-09-30 NOTE — SWALLOW BEDSIDE ASSESSMENT ADULT - COMMENTS
H&P - 88 y/o F with hypothyroidism, AF, CHB s/p PPM, Parkinson disease, asthma, and recurrent UTI's p/w rigors, tachypnea, and lethargy 2/2 sepsis from UTI.    Patient seen at bedside. Patient is lethargic state. Patient is given verbal/tactile cues (e.g. wet cloth to the face and mouth) H&P - 88 y/o F with hypothyroidism, AF, CHB s/p PPM, Parkinson disease, asthma, and recurrent UTI's p/w rigors, tachypnea, and lethargy 2/2 sepsis from UTI.    Patient seen at bedside. Patient is lethargic state. Patient is given verbal/tactile cues (e.g. wet cloth to the face and mouth) to increase level of alertness, patient intermittently open eyes; however appears to be unable to maintain alert state to actively participate for PO Trials. H&P - 89 y/o F with hypothyroidism, AF, CHB s/p PPM, Parkinson disease, asthma, and recurrent UTI's p/w rigors, tachypnea, and lethargy 2/2 sepsis from UTI.    Patient seen at bedside. Patient is lethargic state. Patient is given verbal/tactile cues (e.g. wet cloth to the face and mouth) to increase level of alertness, patient intermittently open eyes.

## 2020-09-30 NOTE — PHARMACOTHERAPY INTERVENTION NOTE - COMMENTS
Medication history is complete. Medication list updated in Outpatient Medication Record ([facility]). Please call spectra [phone] if you have any questions.

## 2020-09-30 NOTE — PROGRESS NOTE ADULT - SUBJECTIVE AND OBJECTIVE BOX
BETHANY JOSHI  89y  Female    Melania Najera MD PGY1 [phone]    Subjective: Pt seen and examined at bedside in AM. lethargic, arousable, following commands. c/o abdominal pain. Updated daughter-in-law at bedside in afternoon. Per daughter-in-law, this current mental status is her baseline, however her lethargy and sleepiness is not.    O/N Events: Admitted overnight    REVIEW OF SYSTEMS: unable to obtain due to AMS    MEDICATIONS  (STANDING):  ALBUTerol    0.083% 2.5 milliGRAM(s) Nebulizer every 6 hours  ALBUTerol    90 MICROgram(s) HFA Inhaler 1 Puff(s) Inhalation every 4 hours  budesonide 160 MICROgram(s)/formoterol 4.5 MICROgram(s) Inhaler 2 Puff(s) Inhalation two times a day  heparin   Injectable 5000 Unit(s) SubCutaneous every 12 hours  levothyroxine 25 MICROGram(s) Oral daily  piperacillin/tazobactam IVPB.. 3.375 Gram(s) IV Intermittent every 8 hours  vancomycin  IVPB 1000 milliGRAM(s) IV Intermittent daily    MEDICATIONS  (PRN):    Vital Signs Last 24 Hrs  T(C): 36.8 (30 Sep 2020 14:54), Max: 37.3 (29 Sep 2020 18:24)  T(F): 98.3 (30 Sep 2020 14:54), Max: 99.1 (29 Sep 2020 18:24)  HR: 86 (30 Sep 2020 17:02) (72 - 90)  BP: 149/79 (30 Sep 2020 14:54) (118/94 - 161/101)  BP(mean): 99 (29 Sep 2020 19:16) (99 - 99)  RR: 19 (30 Sep 2020 14:54) (19 - 24)  SpO2: 100% (30 Sep 2020 17:02) (97% - 100%)    PHYSICAL EXAM:   GENERAL: NAD, well-developed  HEENT - NC/AT, pupils equal and reactive to light,  ; Moist mucous membranes, Good dentition, No lesions  NECK: Supple, No JVD  CHEST/LUNG: diffuse wheezing b/l  HEART: Regular rate and rhythm; No murmurs, rubs, or gallops  ABDOMEN: Soft, Nontender (no wincing or guarding), Nondistended; Bowel sounds present  EXTREMITIES:  No clubbing, cyanosis, Pt LUE is swollen in AM but swelling is reduced in afternoon  NEURO:  AAOx1, moving all extremities      Consultant(s) Notes Reviewed:  [x ] YES  [ ] NO  Care Discussed with Consultants/Other Providers [ x] YES  [ ] NO          133<L>  |  99  |  17  ----------------------------<  106<H>  4.3   |  22  |  1.02      134<L>  |  99  |  23  ----------------------------<  115<H>  5.5<H>   |  20<L>  |  1.15    Ca    9.0      30 Sep 2020 09:40  Ca    9.4      29 Sep 2020 18:20  Phos  3.4       Mg     1.8         TPro  7.0  /  Alb  3.7  /  TBili  0.4  /  DBili  x   /  AST  58<H>  /  ALT  38<H>  /  AlkPhos  109      Magnesium, Serum: 1.8 mg/dL (20 @ 09:40)  Magnesium, Serum: 1.8 mg/dL (20 @ 18:20)  Magnesium, Serum: 1.9 mg/dL (20 @ 17:23)    Phosphorus Level, Serum: 3.4 mg/dL (20 @ 09:40)  Phosphorus Level, Serum: 4.2 mg/dL (20 @ 18:20)  Phosphorus Level, Serum: 4.0 mg/dL (20 @ 17:23)      PT/INR - ( 29 Sep 2020 17:22 )   PT: 13.0 SEC;   INR: 1.15          PTT - ( 29 Sep 2020 17:22 )  PTT:25.3 SEC              Urinalysis Basic - ( 29 Sep 2020 17:57 )    Color: DARK YELLOW / Appearance: TURBID / S.020 / pH: 6.5  Gluc: NEGATIVE / Ketone: NEGATIVE  / Bili: NEGATIVE / Urobili: TRACE   Blood: LARGE / Protein: 70 / Nitrite: NEGATIVE   Leuk Esterase: LARGE / RBC: 26-50 / WBC >50   Sq Epi: FEW / Non Sq Epi: x / Bacteria: MANY                              10.6   7.05  )-----------( 246      ( 30 Sep 2020 09:40 )             33.1                         12.3   7.62  )-----------( 289      ( 29 Sep 2020 17:22 )             38.4       CAPILLARY BLOOD GLUCOSE                Microbiology    Culture - Urine (collected 20 @ 22:17)  Source: .Urine Clean Catch (Midstream)  Preliminary Report (20 @ 17:44):    >100,000 CFU/ml Gram Negative Rods

## 2020-09-30 NOTE — PATIENT PROFILE ADULT - LANGUAGE ASSISTANCE NEEDED
No-Patient/Caregiver offered and refused free interpretation services./pt not able to use  phone at this time

## 2020-09-30 NOTE — PROGRESS NOTE ADULT - ASSESSMENT
89 y/o F with hypothyroidism, AF, CHB s/p PPM, Parkinson disease, asthma, and recurrent UTI's p/w rigors, tachypnea, and lethargy 2/2 sepsis from UTI.

## 2020-09-30 NOTE — SWALLOW BEDSIDE ASSESSMENT ADULT - ADDITIONAL RECOMMENDATIONS
Reconsult as patient continues to improve and able to maintain level of alert state to reassess for a safe oral diet program.

## 2020-10-01 LAB
-  AMIKACIN: SIGNIFICANT CHANGE UP
-  AMOXICILLIN/CLAVULANIC ACID: SIGNIFICANT CHANGE UP
-  AMPICILLIN/SULBACTAM: SIGNIFICANT CHANGE UP
-  AMPICILLIN: SIGNIFICANT CHANGE UP
-  AZTREONAM: SIGNIFICANT CHANGE UP
-  CEFAZOLIN: SIGNIFICANT CHANGE UP
-  CEFEPIME: SIGNIFICANT CHANGE UP
-  CEFOXITIN: SIGNIFICANT CHANGE UP
-  CEFTRIAXONE: SIGNIFICANT CHANGE UP
-  CIPROFLOXACIN: SIGNIFICANT CHANGE UP
-  ERTAPENEM: SIGNIFICANT CHANGE UP
-  GENTAMICIN: SIGNIFICANT CHANGE UP
-  IMIPENEM: SIGNIFICANT CHANGE UP
-  LEVOFLOXACIN: SIGNIFICANT CHANGE UP
-  MEROPENEM: SIGNIFICANT CHANGE UP
-  NITROFURANTOIN: SIGNIFICANT CHANGE UP
-  PIPERACILLIN/TAZOBACTAM: SIGNIFICANT CHANGE UP
-  TIGECYCLINE: SIGNIFICANT CHANGE UP
-  TOBRAMYCIN: SIGNIFICANT CHANGE UP
-  TRIMETHOPRIM/SULFAMETHOXAZOLE: SIGNIFICANT CHANGE UP
ANION GAP SERPL CALC-SCNC: 10 MMO/L — SIGNIFICANT CHANGE UP (ref 7–14)
BUN SERPL-MCNC: 10 MG/DL — SIGNIFICANT CHANGE UP (ref 7–23)
CALCIUM SERPL-MCNC: 9.1 MG/DL — SIGNIFICANT CHANGE UP (ref 8.4–10.5)
CHLORIDE SERPL-SCNC: 100 MMOL/L — SIGNIFICANT CHANGE UP (ref 98–107)
CO2 SERPL-SCNC: 24 MMOL/L — SIGNIFICANT CHANGE UP (ref 22–31)
CREAT SERPL-MCNC: 0.93 MG/DL — SIGNIFICANT CHANGE UP (ref 0.5–1.3)
CULTURE RESULTS: SIGNIFICANT CHANGE UP
GLUCOSE SERPL-MCNC: 85 MG/DL — SIGNIFICANT CHANGE UP (ref 70–99)
HCT VFR BLD CALC: 37 % — SIGNIFICANT CHANGE UP (ref 34.5–45)
HGB BLD-MCNC: 11.7 G/DL — SIGNIFICANT CHANGE UP (ref 11.5–15.5)
MAGNESIUM SERPL-MCNC: 1.8 MG/DL — SIGNIFICANT CHANGE UP (ref 1.6–2.6)
MCHC RBC-ENTMCNC: 27.7 PG — SIGNIFICANT CHANGE UP (ref 27–34)
MCHC RBC-ENTMCNC: 31.6 % — LOW (ref 32–36)
MCV RBC AUTO: 87.5 FL — SIGNIFICANT CHANGE UP (ref 80–100)
METHOD TYPE: SIGNIFICANT CHANGE UP
NRBC # FLD: 0 K/UL — SIGNIFICANT CHANGE UP (ref 0–0)
ORGANISM # SPEC MICROSCOPIC CNT: SIGNIFICANT CHANGE UP
ORGANISM # SPEC MICROSCOPIC CNT: SIGNIFICANT CHANGE UP
PHOSPHATE SERPL-MCNC: 3.3 MG/DL — SIGNIFICANT CHANGE UP (ref 2.5–4.5)
PLATELET # BLD AUTO: 252 K/UL — SIGNIFICANT CHANGE UP (ref 150–400)
PMV BLD: 10.2 FL — SIGNIFICANT CHANGE UP (ref 7–13)
POTASSIUM SERPL-MCNC: 3.9 MMOL/L — SIGNIFICANT CHANGE UP (ref 3.5–5.3)
POTASSIUM SERPL-SCNC: 3.9 MMOL/L — SIGNIFICANT CHANGE UP (ref 3.5–5.3)
RBC # BLD: 4.23 M/UL — SIGNIFICANT CHANGE UP (ref 3.8–5.2)
RBC # FLD: 17.5 % — HIGH (ref 10.3–14.5)
SODIUM SERPL-SCNC: 134 MMOL/L — LOW (ref 135–145)
SPECIMEN SOURCE: SIGNIFICANT CHANGE UP
WBC # BLD: 6.54 K/UL — SIGNIFICANT CHANGE UP (ref 3.8–10.5)
WBC # FLD AUTO: 6.54 K/UL — SIGNIFICANT CHANGE UP (ref 3.8–10.5)

## 2020-10-01 PROCEDURE — 99233 SBSQ HOSP IP/OBS HIGH 50: CPT

## 2020-10-01 RX ORDER — LEVOTHYROXINE SODIUM 125 MCG
19 TABLET ORAL
Refills: 0 | Status: DISCONTINUED | OUTPATIENT
Start: 2020-10-01 | End: 2020-10-05

## 2020-10-01 RX ORDER — LEVOTHYROXINE SODIUM 125 MCG
38 TABLET ORAL
Refills: 0 | Status: DISCONTINUED | OUTPATIENT
Start: 2020-10-01 | End: 2020-10-05

## 2020-10-01 RX ORDER — LEVOTHYROXINE SODIUM 125 MCG
12.5 TABLET ORAL AT BEDTIME
Refills: 0 | Status: DISCONTINUED | OUTPATIENT
Start: 2020-10-01 | End: 2020-10-01

## 2020-10-01 RX ORDER — HEPARIN SODIUM 5000 [USP'U]/ML
5000 INJECTION INTRAVENOUS; SUBCUTANEOUS EVERY 12 HOURS
Refills: 0 | Status: DISCONTINUED | OUTPATIENT
Start: 2020-10-01 | End: 2020-10-05

## 2020-10-01 RX ORDER — SODIUM CHLORIDE 9 MG/ML
1000 INJECTION, SOLUTION INTRAVENOUS
Refills: 0 | Status: DISCONTINUED | OUTPATIENT
Start: 2020-10-01 | End: 2020-10-03

## 2020-10-01 RX ADMIN — PIPERACILLIN AND TAZOBACTAM 25 GRAM(S): 4; .5 INJECTION, POWDER, LYOPHILIZED, FOR SOLUTION INTRAVENOUS at 14:36

## 2020-10-01 RX ADMIN — Medication 38 MICROGRAM(S): at 13:07

## 2020-10-01 RX ADMIN — PIPERACILLIN AND TAZOBACTAM 25 GRAM(S): 4; .5 INJECTION, POWDER, LYOPHILIZED, FOR SOLUTION INTRAVENOUS at 22:00

## 2020-10-01 RX ADMIN — HEPARIN SODIUM 5000 UNIT(S): 5000 INJECTION INTRAVENOUS; SUBCUTANEOUS at 06:17

## 2020-10-01 RX ADMIN — ALBUTEROL 2.5 MILLIGRAM(S): 90 AEROSOL, METERED ORAL at 04:00

## 2020-10-01 RX ADMIN — SODIUM CHLORIDE 50 MILLILITER(S): 9 INJECTION, SOLUTION INTRAVENOUS at 13:07

## 2020-10-01 RX ADMIN — PIPERACILLIN AND TAZOBACTAM 25 GRAM(S): 4; .5 INJECTION, POWDER, LYOPHILIZED, FOR SOLUTION INTRAVENOUS at 05:45

## 2020-10-01 RX ADMIN — HEPARIN SODIUM 5000 UNIT(S): 5000 INJECTION INTRAVENOUS; SUBCUTANEOUS at 18:11

## 2020-10-01 NOTE — PROGRESS NOTE ADULT - SUBJECTIVE AND OBJECTIVE BOX
Patient is a 89y old  Female who presents with a chief complaint of Rigors (30 Sep 2020 17:55)      SUBJECTIVE / OVERNIGHT EVENTS:          MEDICATIONS  (STANDING):  ALBUTerol    0.083% 2.5 milliGRAM(s) Nebulizer every 6 hours  ALBUTerol    90 MICROgram(s) HFA Inhaler 1 Puff(s) Inhalation every 4 hours  budesonide 160 MICROgram(s)/formoterol 4.5 MICROgram(s) Inhaler 2 Puff(s) Inhalation two times a day  heparin   Injectable 5000 Unit(s) SubCutaneous every 12 hours  levothyroxine 25 MICROGram(s) Oral daily  piperacillin/tazobactam IVPB.. 3.375 Gram(s) IV Intermittent every 8 hours  vancomycin  IVPB 1000 milliGRAM(s) IV Intermittent daily    MEDICATIONS  (PRN):      Vital Signs Last 24 Hrs  T(C): 37 (01 Oct 2020 06:12), Max: 37 (01 Oct 2020 06:12)  T(F): 98.6 (01 Oct 2020 06:12), Max: 98.6 (01 Oct 2020 06:12)  HR: 87 (01 Oct 2020 06:12) (72 - 106)  BP: 111/61 (01 Oct 2020 06:12) (111/61 - 149/79)  BP(mean): --  RR: 18 (01 Oct 2020 06:12) (18 - 19)  SpO2: 100% (01 Oct 2020 06:12) (97% - 100%)      PHYSICAL EXAM  GENERAL: NAD, well-developed  HEAD:  Atraumatic, Normocephalic  EYES: EOMI, PERRLA, conjunctiva and sclera clear  NECK: Supple, No JVD  CHEST/LUNG: Clear to auscultation bilaterally; No wheeze  HEART: Regular rate and rhythm; No murmurs, rubs, or gallops  ABDOMEN: Soft, Nontender, Nondistended; Bowel sounds present  EXTREMITIES:  2+ Peripheral Pulses, No clubbing, cyanosis, or edema  PSYCH: AAOx3  SKIN: No rashes or lesions    CAPILLARY BLOOD GLUCOSE      POCT Blood Glucose.: 88 mg/dL (01 Oct 2020 05:13)    I&O's Summary    30 Sep 2020 07:01  -  01 Oct 2020 07:00  --------------------------------------------------------  IN: 0 mL / OUT: 300 mL / NET: -300 mL        LABS:                        10.6   7.05  )-----------( 246      ( 30 Sep 2020 09:40 )             33.1     09-30    133<L>  |  99  |  17  ----------------------------<  106<H>  4.3   |  22  |  1.02    Ca    9.0      30 Sep 2020 09:40  Phos  3.4     -30  Mg     1.8     30    TPro  7.0  /  Alb  3.7  /  TBili  0.4  /  DBili  x   /  AST  58<H>  /  ALT  38<H>  /  AlkPhos  109  -29    PT/INR - ( 29 Sep 2020 17:22 )   PT: 13.0 SEC;   INR: 1.15          PTT - ( 29 Sep 2020 17:22 )  PTT:25.3 SEC      Urinalysis Basic - ( 29 Sep 2020 17:57 )    Color: DARK YELLOW / Appearance: TURBID / S.020 / pH: 6.5  Gluc: NEGATIVE / Ketone: NEGATIVE  / Bili: NEGATIVE / Urobili: TRACE   Blood: LARGE / Protein: 70 / Nitrite: NEGATIVE   Leuk Esterase: LARGE / RBC: 26-50 / WBC >50   Sq Epi: FEW / Non Sq Epi: x / Bacteria: MANY        RADIOLOGY & ADDITIONAL TESTS:     MICROBIOLOGY:    ANTIMICROBIALS:    CONSULTS: Patient is a 89y old  Female who presents with a chief complaint of Rigors (30 Sep 2020 17:55)      SUBJECTIVE / OVERNIGHT EVENTS:    No acute events overnight. Patient is currently AAOX1 and responds to verbal commands; follows some commands. She looked comfortable w/o any acute distress. Unable to assess ROS.       MEDICATIONS  (STANDING):  ALBUTerol    0.083% 2.5 milliGRAM(s) Nebulizer every 6 hours  ALBUTerol    90 MICROgram(s) HFA Inhaler 1 Puff(s) Inhalation every 4 hours  budesonide 160 MICROgram(s)/formoterol 4.5 MICROgram(s) Inhaler 2 Puff(s) Inhalation two times a day  heparin   Injectable 5000 Unit(s) SubCutaneous every 12 hours  levothyroxine 25 MICROGram(s) Oral daily  piperacillin/tazobactam IVPB.. 3.375 Gram(s) IV Intermittent every 8 hours  vancomycin  IVPB 1000 milliGRAM(s) IV Intermittent daily    MEDICATIONS  (PRN):      Vital Signs Last 24 Hrs  T(C): 37 (01 Oct 2020 06:12), Max: 37 (01 Oct 2020 06:12)  T(F): 98.6 (01 Oct 2020 06:12), Max: 98.6 (01 Oct 2020 06:12)  HR: 87 (01 Oct 2020 06:12) (72 - 106)  BP: 111/61 (01 Oct 2020 06:12) (111/61 - 149/79)  BP(mean): --  RR: 18 (01 Oct 2020 06:12) (18 - 19)  SpO2: 100% (01 Oct 2020 06:12) (97% - 100%)      PHYSICAL EXAM  GENERAL: Cachectic, in no acute distress, non-toxic appearing   HEAD:  Atraumatic, Normocephalic  EYES: Conjunctiva and sclera clear  CHEST/LUNG: Good breath sounds on the L, decreased on the R. No wheezing   HEART: Regular rate and rhythm; No murmurs, rubs, or gallops  ABDOMEN: Soft, Nontender, Nondistended; Bowel sounds present  EXTREMITIES:  2+ Peripheral Pulses, No clubbing, cyanosis, or edema      CAPILLARY BLOOD GLUCOSE      POCT Blood Glucose.: 88 mg/dL (01 Oct 2020 05:13)    I&O's Summary    30 Sep 2020 07:01  -  01 Oct 2020 07:00  --------------------------------------------------------  IN: 0 mL / OUT: 300 mL / NET: -300 mL        LABS:                        10.6   7.05  )-----------( 246      ( 30 Sep 2020 09:40 )             33.1     09-30    133<L>  |  99  |  17  ----------------------------<  106<H>  4.3   |  22  |  1.02    Ca    9.0      30 Sep 2020 09:40  Phos  3.4     -30  Mg     1.8     30    TPro  7.0  /  Alb  3.7  /  TBili  0.4  /  DBili  x   /  AST  58<H>  /  ALT  38<H>  /  AlkPhos  109  09-29    PT/INR - ( 29 Sep 2020 17:22 )   PT: 13.0 SEC;   INR: 1.15          PTT - ( 29 Sep 2020 17:22 )  PTT:25.3 SEC      Urinalysis Basic - ( 29 Sep 2020 17:57 )    Color: DARK YELLOW / Appearance: TURBID / S.020 / pH: 6.5  Gluc: NEGATIVE / Ketone: NEGATIVE  / Bili: NEGATIVE / Urobili: TRACE   Blood: LARGE / Protein: 70 / Nitrite: NEGATIVE   Leuk Esterase: LARGE / RBC: 26-50 / WBC >50   Sq Epi: FEW / Non Sq Epi: x / Bacteria: MANY

## 2020-10-01 NOTE — PROGRESS NOTE ADULT - PROBLEM SELECTOR PLAN 2
- treatment of UTI as above  - check CT head -Currently AAOX 1 and responding to verbal stimuli   -As per family patient is at baseline   -Most likely due to infectious/metabolic etiology   -CT head showed no acute pathology

## 2020-10-01 NOTE — PHYSICAL THERAPY INITIAL EVALUATION ADULT - PLANNED THERAPY INTERVENTIONS, PT EVAL
gait training/balance training/transfer training/strengthening/bed mobility training/stretching/postural re-education/ROM

## 2020-10-01 NOTE — PHYSICAL THERAPY INITIAL EVALUATION ADULT - GENERAL OBSERVATIONS, REHAB EVAL
Pt encountered in semisupine position, no distress, AxOx0, lethargic, with +IV, +primafit, and +2L of O2 through nasal cannula.

## 2020-10-01 NOTE — CHART NOTE - NSCHARTNOTEFT_GEN_A_CORE
Called to pt's bedside by family members, including the grandson who is the healthcare proxy, regarding the patient's nutritional status.     An extensive discussion was had as to why PO intake is contraindicated. Discussed w/ the pt and family the speech pathologist's findings of "Oral phase characterized by reduced oral aperture, reduced utensil stripping accepting small amounts at a time, slow, repetitive lingual movements for bolus manipulation, significantly delayed anterior to posterior transport holding bolus in oral cavity for longer than one minute at times despite verbal and tactile cues.  Pharyngeal phase characterized by suspect delayed initiation of the pharyngeal swallow, hyolaryngeal elevation and excursion noted upon palpation." in layman's terms.    The risk of aspiration was also explained to the family and noted pt may silently aspirate, which can lead to pneumonia or worsening respiratory status. Other options introduced to the family include PEG tube placement as well as continued IVF w/ re-assessment by S&S.    Pt's family members, including the HCP, understood the risks of PO feeding and would like to proceed w/ pleasure feeds. Pt will be placed on dysphagia diet as explained to family and advised to discontinue feeds if pt continues to have difficulty swallowing or has any worsening of her resp status.    Plan discussed w/ nurse at bedside.    Dhiraj العراقي MD  Internal Medicine, PGY-2  Pager: [phone] (NS) / [phone] (BRANDIN)

## 2020-10-01 NOTE — PROGRESS NOTE ADULT - PROBLEM SELECTOR PLAN 1
2/2 UTI  - will continue vancomycin and Zosyn  - f/u culture Initially with sepsis with positive urine culture for E.coli   -Bcx negative. Urine culture sensitivity is pending   -D/c vancomycin. C/w zosyn for now (9/29-10/3). De-escalate to oral abx and treat for 5 days.

## 2020-10-01 NOTE — CHART NOTE - NSCHARTNOTEFT_GEN_A_CORE
SLP spoke with HS7, in agreement with swallow eval f/u to assess for candidacy of PO diet.  SLP received patient sitting upright in bed, lethargic but intermittently opens eyes to auditory stimuli and receptive to PO trials, no evidence of pain present.  Patient seen on 9/30/20 for swallow evaluation, please refer to note for full results and recommendations.      SLP provided trials of puree and honey thick liquids, patient presented with severe oropharyngeal dysphagia. Oral phase characterized by reduced oral aperture, reduced utensil stripping accepting small amounts at a time, slow, repetitive lingual movements for bolus manipulation, significantly delayed anterior to posterior transport holding bolus in oral cavity for longer than one minute at times despite verbal and tactile cues.  Pharyngeal phase characterized by suspect delayed initiation of the pharyngeal swallow, hyolaryngeal elevation and excursion noted upon palpation. No clinically overt signs or symptoms of aspiration across trials. However, patient at high risk for aspiration given severity of aforementioned deficits and therefore PO remains contraindicated.      RECOMMENDATIONS:   1. PO remains contraindicated, consideration for short term alternate means of nutrition/hydration/medication   2. Aspiration precautions   3. Oral care to prevent the colonization of oral cavity bacteria  4. Consider reconsulting this service as patient's mentation improves/is medically optimized, this service will follow up as schedule permits    SLP spoke with RN regarding results and recommendations, called Team 7 spectra, updated provider handoff.

## 2020-10-01 NOTE — PROGRESS NOTE ADULT - PROBLEM SELECTOR PLAN 6
Transitions of Care Status:  1.  Name of PCP:  Dr Bennett  2.  PCP Contacted on Admission: [ ] Y    [x] N    3.  PCP contacted at Discharge: [ ] Y    [ ] N    [ ] N/A  4.  Post-Discharge Appointment Date and Location:  5.  Summary of Handoff given to PCP: -C/w albuterol PRN Spoke to Grandson who claims to be the HCP. I explained to him about patient's current predicament. Patient failed S+S evaluation and physical therapy referred rehab. However, grandson is not sure what he wants to do and wants to talk to family first. I explained to him about the options of NG tube and PEG tube placement but he is not sure if he wants his grandmother to undergo such aggressive measures. I also clarified that he wants his grandmother to be full code despite having advanced age and multiple comorbidities. I explained that she might not survive heroic and aggressive measures such as CPR and intubation in cases of emergency. He understood and verbalized his understanding. He wants to talk to his family and then make a decision.

## 2020-10-01 NOTE — PHYSICAL THERAPY INITIAL EVALUATION ADULT - LIVES WITH, PROFILE
CC: Establish care    HPI:     Mary Ceballos is a 62 y.o. male, new patient to the clinic, presents to [facility]. Pt has the following concerns:     1. Type 2 diabetes mellitus without complication, without long-term current use of insulin (HCC)  Chronic, currently taking metformin 500 mg twice daily, tolerating metformin well, denies any side effects.  Denies any visual changes, concerning lesion on his feet, symptoms of polyneuropathy.    2. Pure hypercholesterolemia  Chronic, taking Zocor 40 mg daily    3. Essential hypertension  Chronic, taking lisinopril 10 mg daily as directed, denies any side effects.  Denies chest pain, shortness of breath, dyspnea on exertion, unusual edema.    4. Essential tremor  Chronic, well controlled with propranolol    Patient is , lives with his wife, has no children.  He denies history of drugs, alcohol, tobacco abuse.  Patient also take vitamin D daily.    Current medicines (including changes today)  Current Outpatient Prescriptions   Medication Sig Dispense Refill   • propranolol (INDERAL) 20 MG Tab      • Cholecalciferol (VITAMIN D3) 2000 UNIT Cap Take  by mouth.     • lisinopril (PRINIVIL) 10 MG Tab      • metFORMIN (GLUCOPHAGE) 500 MG Tab      • simvastatin (ZOCOR) 40 MG Tab        No current facility-administered medications for this visit.      He  has no past medical history on file.  He  has a past surgical history that includes open reduction (Left, ) and other.  Social History   Substance Use Topics   • Smoking status: Never Smoker   • Smokeless tobacco: Never Used   • Alcohol use Yes      Comment: socially     Social History     Social History Narrative   • No narrative on file     Family History   Problem Relation Age of Onset   • Heart Disease Father    • Diabetes Sister      Family Status   Relation Status   • Mo Alive   • Fa    • Sis    • MGMo    • MGFa    • PGMo    • PGFa        I personally reviewed  "patient's problem list, allergies, medications, family hx, social hx with patient and update EPIC.     REVIEW OF SYSTEMS:  CONSTITUTIONAL:  Denies night sweats, fatigue, malaise, lethargy, fever or chills.  RESPIRATORY:  Denies cough, wheeze, hemoptysis, or shortness of breath.  CARDIOVASCULAR:  Denies chest pains, palpitations, pedal edema  GASTROINTESTINAL:  Denies abdominal pain, nausea or vomiting, diarrhea, constipation, hematemesis, hematochezia, melena.  GENITOURINARY:  Denies urinary urgency, frequency, dysuria, or hematuria.  No obstructive symptoms.  Denies unusual discharge.    All other systems reviewed and are negative     Objective:     Blood pressure 116/80, pulse 68, temperature 35.8 °C (96.5 °F), temperature source Temporal, height 1.753 m (5' 9\"), weight 101.3 kg (223 lb 6.4 oz), SpO2 95 %. Body mass index is 32.99 kg/m².  Physical Exam:    Constitutional: Awake, alert, in no apparent distress, obese.  Skin: Warm, dry, good turgor, no rashes/jaundice in visible areas.  Eye: PERRL, intact EOM, conjunctiva clear, lids normal.  Neck: Trachea midline, no masses, no thyromegaly. No cervical or supraclavicular lymphadenopathy.  Respiratory: Unlabored respiratory effort, lungs clear to auscultation, no wheezes, no rales.  Cardiovascular: Normal S1, S2, no murmur, no rubs, no gallops, no pedal edema.  Abdomen: Soft, active bowel sounds, non-tender to palpation, no masses, no hepatosplenomegaly, no rebound or guarding.  Psych: Alert and oriented x3, affect and mood wnl, intact judgement and insight.     Monofilament testing with a 10 gram force: sensation intact: intact bilaterally  Visual Inspection: Feet without maceration, ulcers, fissures.  Pedal pulses: intact bilaterally      Assessment and Plan:   The following treatment plan was discussed    1. Type 2 diabetes mellitus without complication, without long-term current use of insulin (HCC)  Chronic, well controlled, A1c was 6.7 today.  " Plans:  -Continue metformin 500 mg twice daily  - Discussed dietary modification, exercise, weight loss  - Annual eye exam  - Regular foot exam  - Side effects of all medications discussed with patient  - Follow up in 3 months.     2. Pure hypercholesterolemia  -Continue Zocor 40 mg daily    3. Essential hypertension  -Continue lisinopril 10 mg daily  - Pt was counseled on dietary modification, weight loss, smoking cessation, and avoidance of excessive alcohol consumption.    - Recommended moderate intensity exercise at least 30 minutes per day x 5 days per week.     4. Essential tremor  Chronic, stable, responding well to propranolol, will continue    5. Obesity (BMI 30-39.9)  - Patient identified as having weight management issue.  Appropriate orders and counseling given.    Ana Maria Santamaria M.D.    Records requested.  Followup: Return in about 6 months (around 4/10/2019) for Multiple issues.    Please note that this dictation was created using voice recognition software. I have made every reasonable attempt to correct obvious errors, but I expect that there are errors of grammar and possibly content that I did not discover before finalizing the note.            spouse/children

## 2020-10-01 NOTE — PHYSICAL THERAPY INITIAL EVALUATION ADULT - ADDITIONAL COMMENTS
Pt is a poor historian. As per Care Coordinator Assessment, pt lives in a private house with her  and children with 2 steps to enter. Prior to hospital admission pt ambulate with the assistance of a rolling walker Pt also owns DME of a cane and wheelchair. Pt had CDPAP for 70 hours for 7days/week which is her daughter and granddaughter.     Pt left comfortable in bed, NAD, all lines intact, all precautions maintained, with call bell in reach, bed alarm on, and RN aware of PT evaluation.

## 2020-10-01 NOTE — PHYSICAL THERAPY INITIAL EVALUATION ADULT - PATIENT PROFILE REVIEW, REHAB EVAL
ACTIVITY: Ambulate with Assistance; spoke with RN Janee prior to PT evaluation--> Pt OK for PT consult/OOB activity./yes

## 2020-10-01 NOTE — PHYSICAL THERAPY INITIAL EVALUATION ADULT - PASSIVE RANGE OF MOTION EXAMINATION, REHAB EVAL
Teresita bilateral lower extremity Passive ROM was WFL (within functional limits)/Bilateral shoulder flexion ~75 degrees, bilateral elbows/hands/wrist WFL

## 2020-10-01 NOTE — PHYSICAL THERAPY INITIAL EVALUATION ADULT - DIAGNOSIS, PT EVAL
Pt admitted for Sepsis 2/2 +UTI; pt presents with decreased strength, decreased balance, and decreased functional mobility.

## 2020-10-01 NOTE — PROGRESS NOTE ADULT - PROBLEM SELECTOR PLAN 4
- hold amlodipine for now -PT referred rehab   -However, family wants to take patient home Hold off on anti-hypertensive medications

## 2020-10-02 ENCOUNTER — TRANSCRIPTION ENCOUNTER (OUTPATIENT)
Age: 85
End: 2020-10-02

## 2020-10-02 PROCEDURE — 99233 SBSQ HOSP IP/OBS HIGH 50: CPT

## 2020-10-02 RX ORDER — ERTAPENEM SODIUM 1 G/1
1000 INJECTION, POWDER, LYOPHILIZED, FOR SOLUTION INTRAMUSCULAR; INTRAVENOUS EVERY 24 HOURS
Refills: 0 | Status: COMPLETED | OUTPATIENT
Start: 2020-10-02 | End: 2020-10-04

## 2020-10-02 RX ORDER — ALBUTEROL 90 UG/1
2.5 AEROSOL, METERED ORAL EVERY 6 HOURS
Refills: 0 | Status: DISCONTINUED | OUTPATIENT
Start: 2020-10-02 | End: 2020-10-05

## 2020-10-02 RX ADMIN — HEPARIN SODIUM 5000 UNIT(S): 5000 INJECTION INTRAVENOUS; SUBCUTANEOUS at 18:42

## 2020-10-02 RX ADMIN — ERTAPENEM SODIUM 120 MILLIGRAM(S): 1 INJECTION, POWDER, LYOPHILIZED, FOR SOLUTION INTRAMUSCULAR; INTRAVENOUS at 12:43

## 2020-10-02 RX ADMIN — ALBUTEROL 2.5 MILLIGRAM(S): 90 AEROSOL, METERED ORAL at 15:40

## 2020-10-02 RX ADMIN — ALBUTEROL 2.5 MILLIGRAM(S): 90 AEROSOL, METERED ORAL at 22:08

## 2020-10-02 RX ADMIN — HEPARIN SODIUM 5000 UNIT(S): 5000 INJECTION INTRAVENOUS; SUBCUTANEOUS at 06:02

## 2020-10-02 RX ADMIN — PIPERACILLIN AND TAZOBACTAM 25 GRAM(S): 4; .5 INJECTION, POWDER, LYOPHILIZED, FOR SOLUTION INTRAVENOUS at 06:02

## 2020-10-02 RX ADMIN — Medication 19 MICROGRAM(S): at 06:01

## 2020-10-02 NOTE — PROGRESS NOTE ADULT - ASSESSMENT
88 y/o F with hypothyroidism, AF, CHB s/p PPM, Parkinson disease, asthma, and recurrent UTI's p/w rigors, tachypnea, and lethargy 2/2 sepsis from UTI. 88 y/o F with hypothyroidism, AF, CHB s/p PPM, Parkinson disease, asthma, and recurrent UTI's p/w rigors, tachypnea, and lethargy 2/2 sepsis from Escherichia coli ESBL UTI.

## 2020-10-02 NOTE — PROGRESS NOTE ADULT - PROBLEM SELECTOR PLAN 1
Initially with sepsis with positive urine culture for E.coli   -Bcx negative. Urine culture sensitivity is pending   -D/c vancomycin. C/w zosyn for now (9/29-10/3). De-escalate to oral abx and treat for 5 days. Initially with sepsis with positive urine culture for E.coli   -Bcx negative. Urine culture sensitivity is pending   -D/c vancomycin. On zosyn but changing to ertapenem given urine cx results.

## 2020-10-02 NOTE — DISCHARGE NOTE PROVIDER - HOSPITAL COURSE
88 y/o F with hypothyroidism, AF, CHB s/p PPM, Parkinson disease, asthma, and recurrent UTI's p/w rigors.  Pt's daughter reports that for the past 5 days, pt has had worsening lethargy, and over the past 2 days developed rigors.  Pt also reported to daughter that she was having abdominal pain.  Daughter also states that pt developed pus in urine yesterday.  Daughter states that pt had no fevers at home. Pt presented for neurology appointment today, and was sent to the ED via EMS before appointment 2/2 symptoms and toxic appearance.      In the ED, pt had hypothermia to 95.6, tachycardia to 140's and tachypnea to 30's.  She had positive UA, and negative RVP including COVID.  She was given, vancomycin, cefepime, and Zosyn, and 1L LR.       HOSPITAL COURSE 88 y/o F with hypothyroidism, AF, CHB s/p PPM, Parkinson disease, asthma, and recurrent UTI's p/w rigors.  Pt's daughter reports that for the past 5 days, pt has had worsening lethargy, and over the past 2 days developed rigors.  Pt also reported to daughter that she was having abdominal pain.  Daughter also states that pt developed pus in urine yesterday.  Daughter states that pt had no fevers at home. Pt presented for neurology appointment today, and was sent to the ED via EMS before appointment 2/2 symptoms and toxic appearance.      In the ED, pt had hypothermia to 95.6, tachycardia to 140's and tachypnea to 30's.  She had positive UA, and negative RVP including COVID.  She was given, vancomycin, cefepime, and Zosyn, and 1L LR.       HOSPITAL COURSE  Blood and urine cultures were drawn and patient was continued on vancomycin and zosyn. CT Head showed chronic lacunar infarcts, microvascular changes and diffuse volume loss consistent with patient's age. Collateral was obtained and family stated that Pt's mental status is at baseline, however the lethargy and rigors are new. Cultures came back positive for ESBL E. coli and patient was started on 3 day course of ertapenem (10/2-10/4). Pt's temperature has remained within normal limits through her stay, rigors have decreased (she still has arm tremors that are likely due to Parkinson's.) Pt is hemodynamically stable and can go home. Of note, patient had speech and swallow eval with recommendation to remain NPO due o aspiration risk. Family has been counseled on risk of PO feeds but wish to continue pleasure feeds. Pt was given IV medications while here (Synthroid) but can restart PO meds outpatient. 89 y/o F with hypothyroidism, AF, CHB s/p PPM, Parkinson disease, asthma, and recurrent UTI's p/w rigors.  Pt's daughter reports that for the past 5 days, pt has had worsening lethargy, and over the past 2 days developed rigors, abdominal pain, and pus in urine.  In the ED, pt had hypothermia to 95.6, tachycardia to 140's and tachypnea to 30's.  She had positive UA, and negative RVP including COVID.  She was given, vancomycin, cefepime, and Zosyn, and 1L LR.     HOSPITAL COURSE  Blood and urine cultures were drawn and patient was continued on vancomycin and zosyn. CT Head showed chronic lacunar infarcts, microvascular changes and diffuse volume loss consistent with patient's age. Collateral was obtained and family stated that Pt's mental status is at baseline, however the lethargy and rigors are new. Cultures came back positive for ESBL E. coli and patient was started on 3 day course of ertapenem (10/2-10/4). Pt's temperature has remained within normal limits through her stay, rigors have decreased (she still has arm tremors that are likely due to Parkinson's.) Pt is hemodynamically stable and can go home. Of note, patient had speech and swallow eval with recommendation to remain NPO due o aspiration risk. Family has been counseled on risk of PO feeds but wish to continue pleasure feeds. Pt was given IV medications while here (Synthroid) but can restart PO meds outpatient.   On October 5th, patient was optimized for discharge. All questions answered. 89 y/o F with hypothyroidism, AF, CHB s/p PPM, Parkinson disease, asthma, and recurrent UTI's p/w rigors.  Pt's daughter reports that for the past 5 days, pt has had worsening lethargy, and over the past 2 days developed rigors, abdominal pain, and pus in urine.  In the ED, pt had hypothermia to 95.6, tachycardia to 140's and tachypnea to 30's.  She had positive UA, and negative RVP including COVID.  She was given, vancomycin, cefepime, and Zosyn, and 1L LR.     HOSPITAL COURSE  Blood and urine cultures were drawn and patient was continued on vancomycin and zosyn. CT Head showed chronic lacunar infarcts, microvascular changes and diffuse volume loss consistent with patient's age. Collateral was obtained and family stated that Pt's mental status is at baseline, however the lethargy and rigors are new. Cultures came back positive for ESBL E. coli and patient was started on 3 day course of ertapenem (10/2-10/4). Pt's temperature has remained within normal limits through her stay, rigors have decreased (she still has arm tremors that are likely due to Parkinson's.) Pt is hemodynamically stable and can go home. Of note, patient had speech and swallow eval with recommendation to remain NPO due o aspiration risk. Family has been counseled on risk of PO feeds but wish to continue pleasure feeds. Pt was given IV medications while here (Synthroid) but can restart PO meds outpatient.   On October 5th, patient was optimized for discharge. All questions answered..

## 2020-10-02 NOTE — DISCHARGE NOTE PROVIDER - NSDCQMPCI_CARD_ALL_CORE
Home  Back  SP  FR  PL  RU  TA  VI  CH    Croup  Your child's barking cough is a sign of croup, which is caused by a virus that affects the air passages just below the vocal cords. It is a common illness and should improve within 5-7 days. Antibiotics will not kill a virus and are generally not prescribed for this illness. An attack of croup usually begins at night and may awaken your child from sleep. The first night is usually the worst, with milder attacks occurring on the second and third night.  Home Care:  1) You can keep your child more comfortable by preventing drying of the upper air passages, as follows:  · Do not keep the room too warm since this dries out the air your child breathes.  · Use a humidifier or vaporizer at the bedside, or leave a window slightly open in the evening to allow the moist night air into the room.  2) During an acute attack, try to keep the child calm since fear and crying make croup worse. The best treatment is to increase the moisture in the air that your child breathes. If you have a cool mist humidifier (ultrasonic), let your child breathe directly from the mist as it comes out of the machine. Otherwise, take the child into a closed bathroom that has been made steamy by running a hot shower for a few minutes. If there is no improvement after 10 minutes, dress the child warmly and go outside into the night air for another 10 minutes.  3) FLUIDS: Fever increases water loss from the body. For infants under 1 year old, continue regular feedings (breast or formula). Between feedings give oral rehydration solution (such as Pedialyte, Infalyte, or Rehydralyte, which are available from grocery and drug stores without a prescription). For children over 1 year old, give plenty of  fluids like water, juice, Jell-O water, 7-Up, ginger-shanique, lemonade, Filipe-Aid or popsicles.  4) ACTIVITY: Croup is contagious to other children. Keep children with fever at home resting or playing  quietly. Encourage frequent naps. Keep your child home from  or school for the first three days of the illness. Your child may return to day care or school when the fever is gone and he (she) is eating well and feeling better.  5) COUGH: Coughing is a normal part of this illness. A cool mist humidifier at the bedside may be helpful. Over-the-counter cough and cold medicines have not been proven to be any more helpful than a placebo (sweet syrup with no medicine in it). However, they can produce serious side effects, especially in infants under 2 years of age. Therefore, do not give over-the-counter cough and cold medicines to children under 6 years unless your doctor has specifically advised you to do so. Also, don’t expose your child to cigarette smoke. It can make the cough worse.   6) FEVER: Use Tylenol (acetaminophen) for fever, fussiness or discomfort, unless another medicine was prescribed. In infants over six months of age, you may use ibuprofen (Children’s Motrin) instead of Tylenol. [NOTE: If your child has chronic liver or kidney disease or has ever had a stomach ulcer or GI bleeding, talk with your doctor before using these medicines.] (Aspirin should never be used in anyone under 18 years of age who is ill with a fever. It may cause severe liver damage.)     Follow Up  with your doctor or as advised within the next seven days or sooner if not improving.  Get Prompt Medical Attention  if any of the following occur:  · Another attack of croup not relieved by the above measures  · Difficulty swallowing or excessive drooling or severe throat pain  · Fever of 100.4°F (38°C) oral or 101.4°F (38.5°C) rectal or higher, not better with fever medication  · Fast breathing (birth to 6 wks: over 60 breaths/min; 6 wk - 2 yr: over 45 breaths/min; 3-6 yr: over 35 breaths/min; 7-10 yrs: over 30 breaths/min; more than 10 yrs old: over 25 breaths/min)  · Unusual fussiness, sleepiness or confusion  © 4810-4947 [employer], [address] [place] [address], [place] [address]. All rights reserved. This information is not intended as a substitute for professional medical care. Always follow your healthcare professional's instructions.       No

## 2020-10-02 NOTE — DISCHARGE NOTE PROVIDER - NSDCCPTREATMENT_GEN_ALL_CORE_FT
PRINCIPAL PROCEDURE  Procedure: CT head wo con  Findings and Treatment:   EXAM:  CT BRAIN    PROCEDURE DATE:  Sep 30 2020   INTERPRETATION:  .  CLINICAL INFORMATION: parkinsons, recurrent UTI's  TECHNIQUE: Multiple axial CT images of the head were obtained without contrast. Sagittal and coronal reconstructed images were acquired from the source data.  COMPARISON: No prior CT studies of the brain are available for comparison at this institution.  FINDINGS: There is no acute intracranial hemorrhage, mass effect, shift of the midline structures, herniation, extra-axial fluid collection, or hydrocephalus.  Chronic lacunar infarcts are seen within the bilateral basal ganglia and right thalamus.  There is diffuse cerebral volume loss with prominence of the sulci, fissures, and cisternal spaces which is normal for the patient's age. 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  The paranasal sinuses and mastoid air cells are clear. The calvarium is intact. There is evidence of bilateral cataract removal.  IMPRESSION: No acute intracranial hemorrhage, mass effect, or shift of the midline structures.

## 2020-10-02 NOTE — DISCHARGE NOTE PROVIDER - CARE PROVIDER_API CALL
Dez Bennett.  Internal Medicine  [address] [place] [address]  Phone: [phone]  Fax: [phone]  Established Patient  Follow Up Time: 1 week

## 2020-10-02 NOTE — PROGRESS NOTE ADULT - PROBLEM SELECTOR PLAN 2
-Currently AAOX 1 and responding to verbal stimuli   -As per family patient is at baseline   -Most likely due to infectious/metabolic etiology   -CT head showed no acute pathology -Currently AAOX0 and responding to verbal stimuli   -As per family patient is at baseline   -Most likely due to infectious/metabolic etiology   -CT head showed no acute pathology

## 2020-10-02 NOTE — DISCHARGE NOTE PROVIDER - NSDCMRMEDTOKEN_GEN_ALL_CORE_FT
albuterol 2.5 mg/3 mL (0.083%) inhalation solution: 3 milliliter(s) inhaled every 4 to 6 hours, As Needed  amLODIPine 5 mg oral tablet: 1 tab(s) orally once a day  docusate sodium 100 mg oral capsule: 1 cap(s) orally 2 times a day, As Needed  levothyroxine 25 mcg (0.025 mg) oral tablet: 1 tab(s) orally every other day  Alternating with 50mcg tablet  levothyroxine 50 mcg (0.05 mg) oral tablet: 1 tab(s) orally every other day  Alternating with 25mcg tablet  Metoprolol Succinate ER 50 mg oral tablet, extended release: 1 tab(s) orally once a day  Multiple Vitamins oral tablet: 1 tab(s) orally once a day  ProAir HFA 90 mcg/inh inhalation aerosol: 2 puff(s) inhaled 4 times a day, As Needed  Symbicort 160 mcg-4.5 mcg/inh inhalation aerosol: 2 puff(s) inhaled 2 times a day  Vitamin D3 1000 intl units (25 mcg) oral tablet: 1 tab(s) orally once a day

## 2020-10-02 NOTE — PROGRESS NOTE ADULT - SUBJECTIVE AND OBJECTIVE BOX
JOSHIBETHANY SIMMONS  89y  Female    Melania Najera MD PGY1 [phone]    Subjective: Pt continues to be lethargic, responds only occasionally (responded "Yes, I'm fine" when asked how she was but did not respond to any ROS questions). Baseline is AAox1 and following commands.     O/N Events: No acute events, Pt is on pleasure feeds now. GOC discussions ongoing.    REVIEW OF SYSTEMS:  CONSTITUTIONAL: No weakness, fevers or chills  EYES/ENT: No visual changes;  No vertigo or throat pain   NECK: No pain or stiffness  RESPIRATORY: No cough, wheezing, hemoptysis; No shortness of breath  CARDIOVASCULAR: No chest pain or palpitations  GASTROINTESTINAL: No abdominal or epigastric pain. No nausea, vomiting, or hematemesis; No diarrhea or constipation. No melena or hematochezia.  GENITOURINARY: No dysuria, frequency or hematuria  NEUROLOGICAL: No numbness or weakness  SKIN: No itching, rashes      MEDICATIONS  (STANDING):  ALBUTerol    90 MICROgram(s) HFA Inhaler 1 Puff(s) Inhalation every 4 hours  budesonide 160 MICROgram(s)/formoterol 4.5 MICROgram(s) Inhaler 2 Puff(s) Inhalation two times a day  dextrose 5% + sodium chloride 0.45%. 1000 milliLiter(s) (50 mL/Hr) IV Continuous <Continuous>  heparin   Injectable 5000 Unit(s) SubCutaneous every 12 hours  levothyroxine Injectable 19 MICROGram(s) IV Push <User Schedule>  levothyroxine Injectable 38 MICROGram(s) IV Push <User Schedule>  piperacillin/tazobactam IVPB.. 3.375 Gram(s) IV Intermittent every 8 hours    Vital Signs Last 24 Hrs  T(C): 36.1 (02 Oct 2020 06:00), Max: 36.6 (01 Oct 2020 14:52)  T(F): 97 (02 Oct 2020 06:00), Max: 97.9 (01 Oct 2020 14:52)  HR: 86 (02 Oct 2020 06:00) (71 - 86)  BP: 117/67 (02 Oct 2020 06:00) (116/59 - 138/63)  BP(mean): --  RR: 18 (02 Oct 2020 06:00) (18 - 20)  SpO2: 100% (02 Oct 2020 06:00) (100% - 100%)    PHYSICAL EXAM: incomplete  GENERAL: NAD, well-groomed, well-developed  HEENT - NC/AT, pupils equal and reactive to light,  ; Moist mucous membranes,Poor dentition, No lesions  CHEST/LUNG: Diffuse, high-pitched, audible wheeze. On 3L NC Satting 100%  HEART: Regular rate and rhythm; No murmurs, rubs, or gallops  ABDOMEN: Soft, Nontender, Nondistended; Bowel sounds present  EXTREMITIES:  well-perfused, No clubbing, cyanosis, + b/l hand swelling  NEURO:  AAOx0, arouses to name, responds to some questions, lethargic, sensory and motor intact  : has primafit    Consultant(s) Notes Reviewed:  [x ] YES  [ ] NO  Care Discussed with Consultants/Other Providers [ x] YES  [ ] NO      10-01    134<L>  |  100  |  10  ----------------------------<  85  3.9   |  24  |  0.93  09-30    133<L>  |  99  |  17  ----------------------------<  106<H>  4.3   |  22  |  1.02  09-29    134<L>  |  99  |  23  ----------------------------<  115<H>  5.5<H>   |  20<L>  |  1.15    Ca    9.1      01 Oct 2020 05:00  Ca    9.0      30 Sep 2020 09:40  Ca    9.4      29 Sep 2020 18:20  Phos  3.3     10-01  Mg     1.8     10-01    TPro  7.0  /  Alb  3.7  /  TBili  0.4  /  DBili  x   /  AST  58<H>  /  ALT  38<H>  /  AlkPhos  109  09-29    Magnesium, Serum: 1.8 mg/dL (10-01-20 @ 05:00)  Magnesium, Serum: 1.8 mg/dL (09-30-20 @ 09:40)  Magnesium, Serum: 1.8 mg/dL (09-29-20 @ 18:20)  Magnesium, Serum: 1.9 mg/dL (09-29-20 @ 17:23)    Phosphorus Level, Serum: 3.3 mg/dL (10-01-20 @ 05:00)  Phosphorus Level, Serum: 3.4 mg/dL (09-30-20 @ 09:40)  Phosphorus Level, Serum: 4.2 mg/dL (09-29-20 @ 18:20)  Phosphorus Level, Serum: 4.0 mg/dL (09-29-20 @ 17:23)                                              11.7   6.54  )-----------( 252      ( 01 Oct 2020 05:00 )             37.0                         10.6   7.05  )-----------( 246      ( 30 Sep 2020 09:40 )             33.1                         12.3   7.62  )-----------( 289      ( 29 Sep 2020 17:22 )             38.4       CAPILLARY BLOOD GLUCOSE                Microbiology    Culture - Blood (collected 09-29-20 @ 20:06)  Source: .Blood Blood-Peripheral  Preliminary Report (09-30-20 @ 21:02):    No growth to date.    Culture - Blood (collected 09-29-20 @ 20:05)  Source: .Blood Blood-Peripheral  Preliminary Report (09-30-20 @ 21:02):    No growth to date.    Culture - Urine (collected 09-29-20 @ 19:52)  Source: .Urine Clean Catch (Midstream)  Final Report (10-01-20 @ 17:16):    >100,000 CFU/ml Escherichia coli ESBL  Organism: Escherichia coli ESBL (10-01-20 @ 17:16)  Organism: Escherichia coli ESBL (10-01-20 @ 17:16)      -  Amikacin: S <=16      -  Amoxicillin/Clavulanic Acid: S <=8/4      -  Ampicillin: R >16 These ampicillin results predict results for amoxicillin      -  Ampicillin/Sulbactam: S <=4/2 Enterobacter, Citrobacter, and Serratia may develop resistance during prolonged therapy (3-4 days)      -  Aztreonam: R <=4      -  Cefazolin: R >16 (MIC_CL_COM_ENTERIC_CEFAZU) For uncomplicated UTI with K. pneumoniae, E. coli, or P. mirablis: SOFIA <=16 is sensitive and SOFIA >=32 is resistant. This also predicts results for oral agents cefaclor, cefdinir, cefpodoxime, cefprozil, cefuroxime axetil, cephalexin and locarbef for uncomplicated UTI. Note that some isolates may be susceptible to these agents while testing resistant to cefazolin.      -  Cefepime: R <=2      -  Cefoxitin: S <=8      -  Ceftriaxone: R >32 Enterobacter, Citrobacter, and Serratia may develop resistance during prolonged therapy      -  Ciprofloxacin: R >2      -  Ertapenem: S <=0.5      -  Gentamicin: S <=2      -  Imipenem: S <=1      -  Levofloxacin: R 4      -  Meropenem: S <=1      -  Nitrofurantoin: S <=32 Should not be used to treat pyelonephritis      -  Piperacillin/Tazobactam: S <=8      -  Tigecycline: S <=2      -  Tobramycin: S <=2      -  Trimethoprim/Sulfamethoxazole: R >2/38      Method Type: SOFIA                           JOSHIBETHANY  89y  Female    Melania Najera MD PGY1 [phone]    Subjective: Pt continues to be lethargic, responds only occasionally (responded "Yes, I'm fine" when asked how she was but did not respond to any ROS questions). Baseline is AAox1 and following commands.     O/N Events: No acute events, Pt is on pleasure feeds now. GOC discussions ongoing.    MEDICATIONS  (STANDING):  ALBUTerol    90 MICROgram(s) HFA Inhaler 1 Puff(s) Inhalation every 4 hours  budesonide 160 MICROgram(s)/formoterol 4.5 MICROgram(s) Inhaler 2 Puff(s) Inhalation two times a day  dextrose 5% + sodium chloride 0.45%. 1000 milliLiter(s) (50 mL/Hr) IV Continuous <Continuous>  heparin   Injectable 5000 Unit(s) SubCutaneous every 12 hours  levothyroxine Injectable 19 MICROGram(s) IV Push <User Schedule>  levothyroxine Injectable 38 MICROGram(s) IV Push <User Schedule>  piperacillin/tazobactam IVPB.. 3.375 Gram(s) IV Intermittent every 8 hours    Vital Signs Last 24 Hrs  T(C): 36.1 (02 Oct 2020 06:00), Max: 36.6 (01 Oct 2020 14:52)  T(F): 97 (02 Oct 2020 06:00), Max: 97.9 (01 Oct 2020 14:52)  HR: 86 (02 Oct 2020 06:00) (71 - 86)  BP: 117/67 (02 Oct 2020 06:00) (116/59 - 138/63)  BP(mean): --  RR: 18 (02 Oct 2020 06:00) (18 - 20)  SpO2: 100% (02 Oct 2020 06:00) (100% - 100%)    PHYSICAL EXAM: incomplete  GENERAL: NAD, well-groomed, well-developed  HEENT - NC/AT, pupils equal and reactive to light,  ; Moist mucous membranes,Poor dentition, No lesions  CHEST/LUNG: Diffuse, high-pitched, audible wheeze. On 3L NC Satting 100%  HEART: Regular rate and rhythm; No murmurs, rubs, or gallops  ABDOMEN: Soft, Nontender, Nondistended; Bowel sounds present  EXTREMITIES:  well-perfused, No clubbing, cyanosis, + b/l hand swelling  NEURO:  AAOx0, arouses to name, responds to some questions, lethargic, sensory and motor intact  : has primafit    Consultant(s) Notes Reviewed:  [x ] YES  [ ] NO  Care Discussed with Consultants/Other Providers [ x] YES  [ ] NO      10-01    134<L>  |  100  |  10  ----------------------------<  85  3.9   |  24  |  0.93  09-30    133<L>  |  99  |  17  ----------------------------<  106<H>  4.3   |  22  |  1.02  09-29    134<L>  |  99  |  23  ----------------------------<  115<H>  5.5<H>   |  20<L>  |  1.15    Ca    9.1      01 Oct 2020 05:00  Ca    9.0      30 Sep 2020 09:40  Ca    9.4      29 Sep 2020 18:20  Phos  3.3     10-01  Mg     1.8     10-01    TPro  7.0  /  Alb  3.7  /  TBili  0.4  /  DBili  x   /  AST  58<H>  /  ALT  38<H>  /  AlkPhos  109  09-29    Magnesium, Serum: 1.8 mg/dL (10-01-20 @ 05:00)  Magnesium, Serum: 1.8 mg/dL (09-30-20 @ 09:40)  Magnesium, Serum: 1.8 mg/dL (09-29-20 @ 18:20)  Magnesium, Serum: 1.9 mg/dL (09-29-20 @ 17:23)    Phosphorus Level, Serum: 3.3 mg/dL (10-01-20 @ 05:00)  Phosphorus Level, Serum: 3.4 mg/dL (09-30-20 @ 09:40)  Phosphorus Level, Serum: 4.2 mg/dL (09-29-20 @ 18:20)  Phosphorus Level, Serum: 4.0 mg/dL (09-29-20 @ 17:23)                                              11.7   6.54  )-----------( 252      ( 01 Oct 2020 05:00 )             37.0                         10.6   7.05  )-----------( 246      ( 30 Sep 2020 09:40 )             33.1                         12.3   7.62  )-----------( 289      ( 29 Sep 2020 17:22 )             38.4       CAPILLARY BLOOD GLUCOSE                Microbiology    Culture - Blood (collected 09-29-20 @ 20:06)  Source: .Blood Blood-Peripheral  Preliminary Report (09-30-20 @ 21:02):    No growth to date.    Culture - Blood (collected 09-29-20 @ 20:05)  Source: .Blood Blood-Peripheral  Preliminary Report (09-30-20 @ 21:02):    No growth to date.    Culture - Urine (collected 09-29-20 @ 19:52)  Source: .Urine Clean Catch (Midstream)  Final Report (10-01-20 @ 17:16):    >100,000 CFU/ml Escherichia coli ESBL  Organism: Escherichia coli ESBL (10-01-20 @ 17:16)  Organism: Escherichia coli ESBL (10-01-20 @ 17:16)      -  Amikacin: S <=16      -  Amoxicillin/Clavulanic Acid: S <=8/4      -  Ampicillin: R >16 These ampicillin results predict results for amoxicillin      -  Ampicillin/Sulbactam: S <=4/2 Enterobacter, Citrobacter, and Serratia may develop resistance during prolonged therapy (3-4 days)      -  Aztreonam: R <=4      -  Cefazolin: R >16 (MIC_CL_COM_ENTERIC_CEFAZU) For uncomplicated UTI with K. pneumoniae, E. coli, or P. mirablis: SOFIA <=16 is sensitive and SOFIA >=32 is resistant. This also predicts results for oral agents cefaclor, cefdinir, cefpodoxime, cefprozil, cefuroxime axetil, cephalexin and locarbef for uncomplicated UTI. Note that some isolates may be susceptible to these agents while testing resistant to cefazolin.      -  Cefepime: R <=2      -  Cefoxitin: S <=8      -  Ceftriaxone: R >32 Enterobacter, Citrobacter, and Serratia may develop resistance during prolonged therapy      -  Ciprofloxacin: R >2      -  Ertapenem: S <=0.5      -  Gentamicin: S <=2      -  Imipenem: S <=1      -  Levofloxacin: R 4      -  Meropenem: S <=1      -  Nitrofurantoin: S <=32 Should not be used to treat pyelonephritis      -  Piperacillin/Tazobactam: S <=8      -  Tigecycline: S <=2      -  Tobramycin: S <=2      -  Trimethoprim/Sulfamethoxazole: R >2/38      Method Type: SOFIA

## 2020-10-02 NOTE — PROGRESS NOTE ADULT - PROBLEM SELECTOR PLAN 6
Spoke to Grandson who claims to be the HCP. I explained to him about patient's current predicament. Patient failed S+S evaluation and physical therapy referred rehab. However, grandson is not sure what he wants to do and wants to talk to family first. I explained to him about the options of NG tube and PEG tube placement but he is not sure if he wants his grandmother to undergo such aggressive measures. I also clarified that he wants his grandmother to be full code despite having advanced age and multiple comorbidities. I explained that she might not survive heroic and aggressive measures such as CPR and intubation in cases of emergency. He understood and verbalized his understanding. He wants to talk to his family and then make a decision. See previous GOC note, Pt full code at this time

## 2020-10-02 NOTE — CHART NOTE - NSCHARTNOTEFT_GEN_A_CORE
Pt's HCP Preet Potts (Grandson, HCP form in chart) was called, updated about Pt's condition over the phone. Reiterated prior discussions regarding speech and swallow evaluation. Pt's grandson stated understanding of risks of pleasure feeds. Plan to continue pleasure feeds. Discussed with Mr. Potts the risks and benefits of aggressive measures such as CPR and intubation/mechanical ventilation given patient's underlying comorbidities, especially asthma, and current and future aspiration risk. Pt states he will start GOC discussions within the family. These discussions also held with daughter-in-law of Pt at bedside, who is primary caretaker during the day for the past several years. She also stated her understanding. Both persons requesting full code at the moment. Pt remains full code.

## 2020-10-02 NOTE — DISCHARGE NOTE PROVIDER - NSDCCPCAREPLAN_GEN_ALL_CORE_FT
PRINCIPAL DISCHARGE DIAGNOSIS  Diagnosis: Sepsis due to Escherichia coli  Assessment and Plan of Treatment: Sepsis is a serious medical condition that is caused by an infection, in your case due to the bacteria E. coli. A urinary tract infection, or UTI, is a general term for an infection anywhere between the kidneys and the urethra (where urine comes out). Most UTIs are bladder infections. They often cause pain or burning when you urinate. UTIs are caused by bacteria and can be cured with antibiotics. You were initially given vancomycin and piperacillian tazobactam, then once your urine culture came in you were given three days of ertapenem.   How can you care for yourself at home?  Avoid drinks that are carbonated or have caffeine. They can irritate the bladder.  Urinate often. Try to empty your bladder each time.  To relieve pain, take a hot bath or lay a heating pad set on low over your lower belly or genital area. Never go to sleep with a heating pad in place.  To prevent UTIs  Drink plenty of water each day. This helps you urinate often, which clears bacteria from your system. (If you have kidney, heart, or liver disease and have to limit fluids, talk with your doctor before you increase your fluid intake.)  Urinate when you need to.  Change sanitary pads often.  Avoid douches, bubble baths, feminine hygiene sprays, and other feminine hygiene products that have deodorants.  After you use the toilet, wipe from front to back.  When should you call for help?  Call your doctor or nurse call line now or seek immediate medical care if:  Symptoms such as fever, chills, nausea, or vomiting get worse or appear for the first time.  You have new pain in your back just below your rib cage. This is called flank pain.  There is new blood or pus in your urine.

## 2020-10-03 PROCEDURE — 99233 SBSQ HOSP IP/OBS HIGH 50: CPT

## 2020-10-03 RX ORDER — SODIUM CHLORIDE 9 MG/ML
1000 INJECTION, SOLUTION INTRAVENOUS
Refills: 0 | Status: DISCONTINUED | OUTPATIENT
Start: 2020-10-03 | End: 2020-10-05

## 2020-10-03 RX ORDER — LANOLIN ALCOHOL/MO/W.PET/CERES
3 CREAM (GRAM) TOPICAL AT BEDTIME
Refills: 0 | Status: DISCONTINUED | OUTPATIENT
Start: 2020-10-03 | End: 2020-10-05

## 2020-10-03 RX ADMIN — Medication 3 MILLIGRAM(S): at 22:05

## 2020-10-03 RX ADMIN — ERTAPENEM SODIUM 120 MILLIGRAM(S): 1 INJECTION, POWDER, LYOPHILIZED, FOR SOLUTION INTRAMUSCULAR; INTRAVENOUS at 12:30

## 2020-10-03 RX ADMIN — ALBUTEROL 2.5 MILLIGRAM(S): 90 AEROSOL, METERED ORAL at 22:40

## 2020-10-03 RX ADMIN — ALBUTEROL 2.5 MILLIGRAM(S): 90 AEROSOL, METERED ORAL at 04:02

## 2020-10-03 RX ADMIN — SODIUM CHLORIDE 40 MILLILITER(S): 9 INJECTION, SOLUTION INTRAVENOUS at 22:05

## 2020-10-03 RX ADMIN — ALBUTEROL 2.5 MILLIGRAM(S): 90 AEROSOL, METERED ORAL at 15:40

## 2020-10-03 RX ADMIN — ALBUTEROL 2.5 MILLIGRAM(S): 90 AEROSOL, METERED ORAL at 09:37

## 2020-10-03 RX ADMIN — HEPARIN SODIUM 5000 UNIT(S): 5000 INJECTION INTRAVENOUS; SUBCUTANEOUS at 17:23

## 2020-10-03 RX ADMIN — HEPARIN SODIUM 5000 UNIT(S): 5000 INJECTION INTRAVENOUS; SUBCUTANEOUS at 06:31

## 2020-10-03 RX ADMIN — Medication 38 MICROGRAM(S): at 06:30

## 2020-10-03 RX ADMIN — SODIUM CHLORIDE 40 MILLILITER(S): 9 INJECTION, SOLUTION INTRAVENOUS at 17:00

## 2020-10-03 NOTE — PROGRESS NOTE ADULT - PROBLEM SELECTOR PLAN 1
Initially with sepsis with positive urine culture for E.coli   -Bcx negative. Urine culture sensitivity - changed zosyn to ertapenem   -D/c vancomycin, d/c zosyn, c/w ertapenem 3 days (10/2-10/4)

## 2020-10-03 NOTE — PROGRESS NOTE ADULT - SUBJECTIVE AND OBJECTIVE BOX
BETHANY OJSHI  89y  Female    Melania Najera MD PGY1 [phone]    Subjective: Pt seen an examined at bedside. Asleep    O/N Events: No acute events overnight.     ROS - unable to obtain      MEDICATIONS  (STANDING):  ALBUTerol    0.083% 2.5 milliGRAM(s) Nebulizer every 6 hours  dextrose 5% + sodium chloride 0.45%. 1000 milliLiter(s) (50 mL/Hr) IV Continuous <Continuous>  ertapenem  IVPB 1000 milliGRAM(s) IV Intermittent every 24 hours  heparin   Injectable 5000 Unit(s) SubCutaneous every 12 hours  levothyroxine Injectable 19 MICROGram(s) IV Push <User Schedule>  levothyroxine Injectable 38 MICROGram(s) IV Push <User Schedule>    MEDICATIONS  (PRN):      Vital Signs Last 24 Hrs  T(C): 36.8 (03 Oct 2020 06:29), Max: 37 (03 Oct 2020 01:57)  T(F): 98.3 (03 Oct 2020 06:29), Max: 98.6 (03 Oct 2020 01:57)  HR: 94 (03 Oct 2020 06:29) (79 - 100)  BP: 149/75 (03 Oct 2020 06:29) (126/75 - 149/75)  BP(mean): --  RR: 19 (03 Oct 2020 06:29) (18 - 20)  SpO2: 99% (03 Oct 2020 06:29) (97% - 100%)    PHYSICAL EXAM:   GENERAL: NAD, well-groomed, well-developed  CHEST/LUNG: Wheeze resolved, lungs CTAB, On RA Satting 100%  HEART: Regular rate and rhythm; No murmurs, rubs, or gallops  ABDOMEN: Soft, Nontender, Nondistended; Bowel sounds present  EXTREMITIES:  well-perfused, No clubbing, cyanosis, + b/l hand swelling  NEURO:  AAOx0, arouses to name, responds to some questions, lethargic, sensory and motor intact  : has primafit, incontinent, urinating    Consultant(s) Notes Reviewed:  [ ] YES  [ ] NO  Care Discussed with Consultants/Other Providers [ x] YES  [ ] NO      10-01    134<L>  |  100  |  10  ----------------------------<  85  3.9   |  24  |  0.93  09-30    133<L>  |  99  |  17  ----------------------------<  106<H>  4.3   |  22  |  1.02    Ca    9.1      01 Oct 2020 05:00  Ca    9.0      30 Sep 2020 09:40      Magnesium, Serum: 1.8 mg/dL (10-01-20 @ 05:00)  Magnesium, Serum: 1.8 mg/dL (09-30-20 @ 09:40)    Phosphorus Level, Serum: 3.3 mg/dL (10-01-20 @ 05:00)  Phosphorus Level, Serum: 3.4 mg/dL (09-30-20 @ 09:40)                                              11.7   6.54  )-----------( 252      ( 01 Oct 2020 05:00 )             37.0                         10.6   7.05  )-----------( 246      ( 30 Sep 2020 09:40 )             33.1       CAPILLARY BLOOD GLUCOSE                Microbiology    Culture - Blood (collected 09-29-20 @ 20:06)  Source: .Blood Blood-Peripheral  Preliminary Report (09-30-20 @ 21:02):    No growth to date.    Culture - Blood (collected 09-29-20 @ 20:05)  Source: .Blood Blood-Peripheral  Preliminary Report (09-30-20 @ 21:02):    No growth to date.    Culture - Urine (collected 09-29-20 @ 19:52)  Source: .Urine Clean Catch (Midstream)  Final Report (10-01-20 @ 17:16):    >100,000 CFU/ml Escherichia coli ESBL  Organism: Escherichia coli ESBL (10-01-20 @ 17:16)  Organism: Escherichia coli ESBL (10-01-20 @ 17:16)      -  Amikacin: S <=16      -  Amoxicillin/Clavulanic Acid: S <=8/4      -  Ampicillin: R >16 These ampicillin results predict results for amoxicillin      -  Ampicillin/Sulbactam: S <=4/2 Enterobacter, Citrobacter, and Serratia may develop resistance during prolonged therapy (3-4 days)      -  Aztreonam: R <=4      -  Cefazolin: R >16 (MIC_CL_COM_ENTERIC_CEFAZU) For uncomplicated UTI with K. pneumoniae, E. coli, or P. mirablis: SOFIA <=16 is sensitive and SOFIA >=32 is resistant. This also predicts results for oral agents cefaclor, cefdinir, cefpodoxime, cefprozil, cefuroxime axetil, cephalexin and locarbef for uncomplicated UTI. Note that some isolates may be susceptible to these agents while testing resistant to cefazolin.      -  Cefepime: R <=2      -  Cefoxitin: S <=8      -  Ceftriaxone: R >32 Enterobacter, Citrobacter, and Serratia may develop resistance during prolonged therapy      -  Ciprofloxacin: R >2      -  Ertapenem: S <=0.5      -  Gentamicin: S <=2      -  Imipenem: S <=1      -  Levofloxacin: R 4      -  Meropenem: S <=1      -  Nitrofurantoin: S <=32 Should not be used to treat pyelonephritis      -  Piperacillin/Tazobactam: S <=8      -  Tigecycline: S <=2      -  Tobramycin: S <=2      -  Trimethoprim/Sulfamethoxazole: R >2/38      Method Type: SOFIA                           BETHANY JOSHI  89y  Female    Melania Najera MD PGY1 [phone]    Subjective: Pt seen an examined at bedside. Asleep. In Afternoon, spoke to family - grandson and daughter-in-law - at bedside.    O/N Events: No acute events overnight.     ROS - unable to obtain      MEDICATIONS  (STANDING):  ALBUTerol    0.083% 2.5 milliGRAM(s) Nebulizer every 6 hours  dextrose 5% + sodium chloride 0.45%. 1000 milliLiter(s) (50 mL/Hr) IV Continuous <Continuous>  ertapenem  IVPB 1000 milliGRAM(s) IV Intermittent every 24 hours  heparin   Injectable 5000 Unit(s) SubCutaneous every 12 hours  levothyroxine Injectable 19 MICROGram(s) IV Push <User Schedule>  levothyroxine Injectable 38 MICROGram(s) IV Push <User Schedule>    MEDICATIONS  (PRN):      Vital Signs Last 24 Hrs  T(C): 36.8 (03 Oct 2020 06:29), Max: 37 (03 Oct 2020 01:57)  T(F): 98.3 (03 Oct 2020 06:29), Max: 98.6 (03 Oct 2020 01:57)  HR: 94 (03 Oct 2020 06:29) (79 - 100)  BP: 149/75 (03 Oct 2020 06:29) (126/75 - 149/75)  BP(mean): --  RR: 19 (03 Oct 2020 06:29) (18 - 20)  SpO2: 99% (03 Oct 2020 06:29) (97% - 100%)    PHYSICAL EXAM:   GENERAL: NAD, well-groomed, well-developed  CHEST/LUNG: Wheeze resolved, lungs CTAB, On RA Satting 100%  HEART: Regular rate and rhythm; No murmurs, rubs, or gallops  ABDOMEN: Soft, Nontender, Nondistended; Bowel sounds present  EXTREMITIES:  well-perfused, No clubbing, cyanosis, + b/l hand swelling  NEURO:  AAOx0, arouses to name, responds to some questions, lethargic, sensory and motor intact  : has primafit, incontinent, urinating    Consultant(s) Notes Reviewed:  [ ] YES  [ ] NO  Care Discussed with Consultants/Other Providers [ x] YES  [ ] NO      10-01    134<L>  |  100  |  10  ----------------------------<  85  3.9   |  24  |  0.93  09-30    133<L>  |  99  |  17  ----------------------------<  106<H>  4.3   |  22  |  1.02    Ca    9.1      01 Oct 2020 05:00  Ca    9.0      30 Sep 2020 09:40      Magnesium, Serum: 1.8 mg/dL (10-01-20 @ 05:00)  Magnesium, Serum: 1.8 mg/dL (09-30-20 @ 09:40)    Phosphorus Level, Serum: 3.3 mg/dL (10-01-20 @ 05:00)  Phosphorus Level, Serum: 3.4 mg/dL (09-30-20 @ 09:40)                                              11.7   6.54  )-----------( 252      ( 01 Oct 2020 05:00 )             37.0                         10.6   7.05  )-----------( 246      ( 30 Sep 2020 09:40 )             33.1       CAPILLARY BLOOD GLUCOSE                Microbiology    Culture - Blood (collected 09-29-20 @ 20:06)  Source: .Blood Blood-Peripheral  Preliminary Report (09-30-20 @ 21:02):    No growth to date.    Culture - Blood (collected 09-29-20 @ 20:05)  Source: .Blood Blood-Peripheral  Preliminary Report (09-30-20 @ 21:02):    No growth to date.    Culture - Urine (collected 09-29-20 @ 19:52)  Source: .Urine Clean Catch (Midstream)  Final Report (10-01-20 @ 17:16):    >100,000 CFU/ml Escherichia coli ESBL  Organism: Escherichia coli ESBL (10-01-20 @ 17:16)  Organism: Escherichia coli ESBL (10-01-20 @ 17:16)      -  Amikacin: S <=16      -  Amoxicillin/Clavulanic Acid: S <=8/4      -  Ampicillin: R >16 These ampicillin results predict results for amoxicillin      -  Ampicillin/Sulbactam: S <=4/2 Enterobacter, Citrobacter, and Serratia may develop resistance during prolonged therapy (3-4 days)      -  Aztreonam: R <=4      -  Cefazolin: R >16 (MIC_CL_COM_ENTERIC_CEFAZU) For uncomplicated UTI with K. pneumoniae, E. coli, or P. mirablis: SOFIA <=16 is sensitive and SOFIA >=32 is resistant. This also predicts results for oral agents cefaclor, cefdinir, cefpodoxime, cefprozil, cefuroxime axetil, cephalexin and locarbef for uncomplicated UTI. Note that some isolates may be susceptible to these agents while testing resistant to cefazolin.      -  Cefepime: R <=2      -  Cefoxitin: S <=8      -  Ceftriaxone: R >32 Enterobacter, Citrobacter, and Serratia may develop resistance during prolonged therapy      -  Ciprofloxacin: R >2      -  Ertapenem: S <=0.5      -  Gentamicin: S <=2      -  Imipenem: S <=1      -  Levofloxacin: R 4      -  Meropenem: S <=1      -  Nitrofurantoin: S <=32 Should not be used to treat pyelonephritis      -  Piperacillin/Tazobactam: S <=8      -  Tigecycline: S <=2      -  Tobramycin: S <=2      -  Trimethoprim/Sulfamethoxazole: R >2/38      Method Type: SOFIA

## 2020-10-03 NOTE — PROGRESS NOTE ADULT - PROBLEM SELECTOR PLAN 6
See previous GOC note, Pt full code at this time See previous GOC note, Pt full code at this time  Pt placed on 1L D5 NS over 24 hours.   Plan for d/c on Monday back home, Pt has home care

## 2020-10-03 NOTE — PROGRESS NOTE ADULT - PROBLEM SELECTOR PLAN 5
d/c budesonide, restarted albuterol neb as pt unable to use inhaler d/c budesonide, restarted albuterol neb as pt unable to use inhaler  Pt was on 3L NC but downtitrated to 2L, now satting well on RA

## 2020-10-03 NOTE — PROGRESS NOTE ADULT - PROBLEM SELECTOR PLAN 2
-Currently AAOX0 and responding to verbal stimuli   -As per family patient is at baseline   -Most likely due to infectious/metabolic etiology   -CT head showed no acute pathology, chronic volume loss and microvascular changes only.

## 2020-10-03 NOTE — PROGRESS NOTE ADULT - ASSESSMENT
88 y/o F with hypothyroidism, AF, CHB s/p PPM, Parkinson disease, asthma, and recurrent UTI's p/w rigors, tachypnea, and lethargy 2/2 sepsis from Escherichia coli ESBL UTI.

## 2020-10-04 LAB
ANION GAP SERPL CALC-SCNC: 10 MMO/L — SIGNIFICANT CHANGE UP (ref 7–14)
BUN SERPL-MCNC: 9 MG/DL — SIGNIFICANT CHANGE UP (ref 7–23)
CALCIUM SERPL-MCNC: 8.9 MG/DL — SIGNIFICANT CHANGE UP (ref 8.4–10.5)
CHLORIDE SERPL-SCNC: 103 MMOL/L — SIGNIFICANT CHANGE UP (ref 98–107)
CO2 SERPL-SCNC: 25 MMOL/L — SIGNIFICANT CHANGE UP (ref 22–31)
CREAT SERPL-MCNC: 0.72 MG/DL — SIGNIFICANT CHANGE UP (ref 0.5–1.3)
CULTURE RESULTS: SIGNIFICANT CHANGE UP
CULTURE RESULTS: SIGNIFICANT CHANGE UP
GLUCOSE SERPL-MCNC: 112 MG/DL — HIGH (ref 70–99)
HCT VFR BLD CALC: 36.1 % — SIGNIFICANT CHANGE UP (ref 34.5–45)
HGB BLD-MCNC: 11.7 G/DL — SIGNIFICANT CHANGE UP (ref 11.5–15.5)
MAGNESIUM SERPL-MCNC: 1.6 MG/DL — SIGNIFICANT CHANGE UP (ref 1.6–2.6)
MCHC RBC-ENTMCNC: 27.3 PG — SIGNIFICANT CHANGE UP (ref 27–34)
MCHC RBC-ENTMCNC: 32.4 % — SIGNIFICANT CHANGE UP (ref 32–36)
MCV RBC AUTO: 84.1 FL — SIGNIFICANT CHANGE UP (ref 80–100)
NRBC # FLD: 0 K/UL — SIGNIFICANT CHANGE UP (ref 0–0)
PHOSPHATE SERPL-MCNC: 2.6 MG/DL — SIGNIFICANT CHANGE UP (ref 2.5–4.5)
PLATELET # BLD AUTO: 265 K/UL — SIGNIFICANT CHANGE UP (ref 150–400)
PMV BLD: 9 FL — SIGNIFICANT CHANGE UP (ref 7–13)
POTASSIUM SERPL-MCNC: 3.6 MMOL/L — SIGNIFICANT CHANGE UP (ref 3.5–5.3)
POTASSIUM SERPL-SCNC: 3.6 MMOL/L — SIGNIFICANT CHANGE UP (ref 3.5–5.3)
RBC # BLD: 4.29 M/UL — SIGNIFICANT CHANGE UP (ref 3.8–5.2)
RBC # FLD: 17.4 % — HIGH (ref 10.3–14.5)
SODIUM SERPL-SCNC: 138 MMOL/L — SIGNIFICANT CHANGE UP (ref 135–145)
SPECIMEN SOURCE: SIGNIFICANT CHANGE UP
SPECIMEN SOURCE: SIGNIFICANT CHANGE UP
WBC # BLD: 6.17 K/UL — SIGNIFICANT CHANGE UP (ref 3.8–10.5)
WBC # FLD AUTO: 6.17 K/UL — SIGNIFICANT CHANGE UP (ref 3.8–10.5)

## 2020-10-04 PROCEDURE — 99232 SBSQ HOSP IP/OBS MODERATE 35: CPT

## 2020-10-04 RX ADMIN — ALBUTEROL 2.5 MILLIGRAM(S): 90 AEROSOL, METERED ORAL at 10:30

## 2020-10-04 RX ADMIN — HEPARIN SODIUM 5000 UNIT(S): 5000 INJECTION INTRAVENOUS; SUBCUTANEOUS at 07:23

## 2020-10-04 RX ADMIN — ALBUTEROL 2.5 MILLIGRAM(S): 90 AEROSOL, METERED ORAL at 22:54

## 2020-10-04 RX ADMIN — ALBUTEROL 2.5 MILLIGRAM(S): 90 AEROSOL, METERED ORAL at 16:13

## 2020-10-04 RX ADMIN — HEPARIN SODIUM 5000 UNIT(S): 5000 INJECTION INTRAVENOUS; SUBCUTANEOUS at 18:57

## 2020-10-04 RX ADMIN — ERTAPENEM SODIUM 120 MILLIGRAM(S): 1 INJECTION, POWDER, LYOPHILIZED, FOR SOLUTION INTRAMUSCULAR; INTRAVENOUS at 12:07

## 2020-10-04 RX ADMIN — Medication 3 MILLIGRAM(S): at 22:28

## 2020-10-04 RX ADMIN — ALBUTEROL 2.5 MILLIGRAM(S): 90 AEROSOL, METERED ORAL at 04:10

## 2020-10-04 NOTE — PROGRESS NOTE ADULT - SUBJECTIVE AND OBJECTIVE BOX
JOSHIBETHANY SIMMONS  89y  Female    Melania Najera MD PGY1 [phone]    Subjective: Pt AAox1 today, responds "I don't know" in [facility] to most questions.     O/N Events: No acute events overnight.    REVIEW OF SYSTEMS: unable to obtain      MEDICATIONS  (STANDING):  ALBUTerol    0.083% 2.5 milliGRAM(s) Nebulizer every 6 hours  dextrose 5% + sodium chloride 0.9%. 1000 milliLiter(s) (40 mL/Hr) IV Continuous <Continuous>  ertapenem  IVPB 1000 milliGRAM(s) IV Intermittent every 24 hours  heparin   Injectable 5000 Unit(s) SubCutaneous every 12 hours  levothyroxine Injectable 19 MICROGram(s) IV Push <User Schedule>  levothyroxine Injectable 38 MICROGram(s) IV Push <User Schedule>  melatonin 3 milliGRAM(s) Oral at bedtime    MEDICATIONS  (PRN):      Vital Signs Last 24 Hrs  T(C): 36.2 (04 Oct 2020 06:06), Max: 36.8 (03 Oct 2020 21:21)  T(F): 97.1 (04 Oct 2020 06:06), Max: 98.3 (03 Oct 2020 21:21)  HR: 95 (04 Oct 2020 06:06) (86 - 97)  BP: 110/67 (04 Oct 2020 06:06) (110/67 - 146/87)  BP(mean): --  RR: 17 (04 Oct 2020 06:06) (17 - 19)  SpO2: 99% (04 Oct 2020 06:06) (97% - 100%)    PHYSICAL EXAM:   GENERAL: NAD, elderly lady, lays curled up most of the time, well-developed  HEENT - NC/AT, pupils equal and reactive to light,  ; Moist mucous membranes, No lesions  CHEST/LUNG: Clear to auscultation bilaterally; wheezing has resolved  HEART: Regular rate and rhythm; No murmurs, rubs, or gallops  ABDOMEN: Soft, Nontender, Nondistended; Bowel sounds present  EXTREMITIES:  well-perfusedNo clubbing, cyanosis, or edema  NEURO:  No Focal deficits, sensory and motor intact, dementia, AAox1, follows commands      Consultant(s) Notes Reviewed:  [x ] YES  [ ] NO  Care Discussed with Consultants/Other Providers [ x] YES  [ ] NO

## 2020-10-04 NOTE — PROGRESS NOTE ADULT - ASSESSMENT
89 y/o F with hypothyroidism, AF, CHB s/p PPM, Parkinson disease, asthma, and recurrent UTI's p/w rigors, tachypnea, and lethargy 2/2 sepsis from Escherichia coli ESBL UTI.

## 2020-10-04 NOTE — PROGRESS NOTE ADULT - PROBLEM SELECTOR PLAN 6
See previous GOC note, Pt full code at this time  Pt placed on 1L D5 NS over 24 hours.   Plan for d/c on Monday back home, Pt has home care

## 2020-10-04 NOTE — PROGRESS NOTE ADULT - PROBLEM SELECTOR PLAN 5
d/c budesonide, restarted albuterol neb as pt unable to use inhaler  Pt was on 3L NC but downtitrated to 2L, now satting well on RA

## 2020-10-05 ENCOUNTER — TRANSCRIPTION ENCOUNTER (OUTPATIENT)
Age: 85
End: 2020-10-05

## 2020-10-05 VITALS
DIASTOLIC BLOOD PRESSURE: 78 MMHG | HEART RATE: 98 BPM | RESPIRATION RATE: 18 BRPM | SYSTOLIC BLOOD PRESSURE: 145 MMHG | OXYGEN SATURATION: 97 % | TEMPERATURE: 98 F

## 2020-10-05 PROCEDURE — 99239 HOSP IP/OBS DSCHRG MGMT >30: CPT | Mod: GC

## 2020-10-05 RX ADMIN — Medication 19 MICROGRAM(S): at 09:51

## 2020-10-05 RX ADMIN — HEPARIN SODIUM 5000 UNIT(S): 5000 INJECTION INTRAVENOUS; SUBCUTANEOUS at 07:30

## 2020-10-05 RX ADMIN — ALBUTEROL 2.5 MILLIGRAM(S): 90 AEROSOL, METERED ORAL at 04:10

## 2020-10-05 RX ADMIN — ALBUTEROL 2.5 MILLIGRAM(S): 90 AEROSOL, METERED ORAL at 09:45

## 2020-10-05 NOTE — PROGRESS NOTE ADULT - PROBLEM SELECTOR PROBLEM 4
Essential (primary) hypertension

## 2020-10-05 NOTE — PROGRESS NOTE ADULT - PROBLEM SELECTOR PLAN 1
Initially with sepsis with positive urine culture for E.coli   -Bcx negative. Urine culture sensitivity - changed zosyn to ertapenem   -D/c vancomycin, d/c zosyn, s/p ertapenem 3 days (10/2-10/4)

## 2020-10-05 NOTE — PROGRESS NOTE ADULT - PROBLEM SELECTOR PLAN 2
-Currently AAOX0-1 and responding to verbal stimuli in [facility]   -As per family patient is at baseline   -Most likely due to dementia, volume loss and chronic ischemic microvascular changes.   -CT head showed no acute pathology, chronic volume loss and microvascular changes only.

## 2020-10-05 NOTE — DISCHARGE NOTE NURSING/CASE MANAGEMENT/SOCIAL WORK - PATIENT PORTAL LINK FT
You can access the FollowMyHealth Patient Portal offered by [facility] by registering at the following website: [email or web address]. By joining [employer]s FollowMyHealth portal, you will also be able to view your health information using other applications (apps) compatible with our system.

## 2020-10-05 NOTE — PROGRESS NOTE ADULT - ATTENDING COMMENTS
Patient is a 89 y/o woman with hypothyroidism, AF, CHB s/p PPM, Parkinson disease (wheelchair bound), asthma, and recurrent UTI's p/w metabolic encephalopathy in setting of sepsis 2/2 ESBL UTI. CTH neg. Patient for three day course of ertapenem last day 10/4. Course complicated by failed S&S eval. Family discussion had during the week and desire pleasure feeds despite risk of aspiration. GOC discussion had and want patient full code. Family wants patient home not rehab. Plan d'c home pending medical stability and completion of abx, 10/4-10/5.   - ertapenem through 10/4   - convert synthroid to oral on discharge   - nebs prn for asthma; respiratory status stable on RA
88 y/o F w/ PMHx as stated above a/w metabolic encephalopathy in setting of sepsis suspected 2/2 UTI. CTH neg.     -c/w vanc/zosyn. Was started on vanc given hx of PM. Will dc vanc if blood cx neg. Likely will deescalate zosyn as well.    -f/u blood cx and urine cx speciation and sensitivities  -monitor hemodynamics.   -Monitor MS. CTH unremarkable.   -S/p SandS. NPO for now. Will start maintenance ivfs     Dispo- pending further medical optimization
89 y.o. Female w/ hx Parkinson's disease, asthma, AF, CHB s/p PPM, recurrent UTI a/w metabolic encephalopathy due to sepsis from ESBL UTI now resolved s/p Ertapenem. D/c planning to home since family refused rehab. d/c 40 minutes.
Patient is a 89 y/o woman with hypothyroidism, AF, CHB s/p PPM, Parkinson disease (wheelchair bound), asthma, and recurrent UTI's p/w metabolic encephalopathy in setting of sepsis 2/2 ESBL UTI. CTH neg. Patient for three day course of ertapenem last day 10/4. Course complicated by failed S&S eval. Family discussion had during the week and desire pleasure feeds despite risk of aspiration. GOC discussion had and want patient full code. Family wants patient home not rehab. Plan d'c home pending medical stability and completion of abx, 1  - ertapenem through 10/4   - convert synthroid to oral on discharge   - nebs prn for asthma; respiratory status stable on RA.   - d'c primafit patient having discomfort w/ primafit  - d'c tomorrow, plan discussed with patient daughter
88 y/o F w/ PMHx as stated above a/w metabolic encephalopathy in setting of sepsis suspected 2/2 E.coli UTI. CTH neg. Blood cx NTD.     -Continue to Monitor MS. S/p CTH that was unremarkable. Per family discussion pt appears to be near baseline but w/ decreased alertness.   -Was on vanc/zosyn. Vanc now dcd given blood cx NTD. Pt w/ recurrent UTIs and no prior urine cx available.  Urine cx now + for ESBL - notes sensitivity to zosyn however SOFIA of 8. This was d/w pharmacy Raj Duval who recommends against zosyn. Will start ertapenem 1g qd for 3 days.  -S/p SandS but failed w/ recs for NPO. Maintenance ivfs while NPO. Case d/w family in re to pts NPO status. Family currently wants pleasure feeds knowing risk of aspiration. They would not want PEG at this time. Pt currently full code however. Family/HCP coming in later today for further advanced care discussion.     Dispo- Pending further medical optimization. Anticipated dc for Monday.
89 y/o F w/ PMHx as stated above a/w metabolic encephalopathy in setting of sepsis suspected 2/2 E.coli UTI. CTH neg. Blood cx NTD.     -Continue to Monitor MS. S/p CTH that was unremarkable. Per family discussion pt appears to be near baseline but w/ decreased alertness.   -Was on vanc/zosyn. Vanc now dcd given blood cx NTD. Pt w/ recurrent UTIs and no prior urine cx available. C/w zosyn for now pending sensitivities of urine cx.   -F/u urine cx sensitivities  -monitor hemodynamics.   -S/p SandS but failed. NPO for now. Maintenance ivfs while NPO. Would convert all necessary oral meds it IV. Will f/u w/ family in re: next steps if PO ability does not improve significantly.     Dispo- pending further medical optimization

## 2020-10-05 NOTE — PROGRESS NOTE ADULT - PROBLEM SELECTOR PROBLEM 1
Sepsis, due to unspecified organism, unspecified whether acute organ dysfunction present

## 2020-10-05 NOTE — PROGRESS NOTE ADULT - PROBLEM SELECTOR PLAN 6
See previous GOC note, Pt full code at this time  Pt placed on 1L D5 NS over 24 hours, now D/C'd.  Plan for d/c on Monday back home, Pt has home care

## 2020-10-05 NOTE — PROGRESS NOTE ADULT - SUBJECTIVE AND OBJECTIVE BOX
BETHANY JOSHI  89y  Female    Melania Najera MD PGY1 [phone]    Subjective: Pt seen an examined at bedside.     O/N Events: No acute events overnight.     ROS - unable to obtain      MEDICATIONS  (STANDING):  ALBUTerol    0.083% 2.5 milliGRAM(s) Nebulizer every 6 hours  dextrose 5% + sodium chloride 0.45%. 1000 milliLiter(s) (50 mL/Hr) IV Continuous <Continuous>  ertapenem  IVPB 1000 milliGRAM(s) IV Intermittent every 24 hours  heparin   Injectable 5000 Unit(s) SubCutaneous every 12 hours  levothyroxine Injectable 19 MICROGram(s) IV Push <User Schedule>  levothyroxine Injectable 38 MICROGram(s) IV Push <User Schedule>    MEDICATIONS  (PRN):      Vital Signs Last 24 Hrs  T(C): 36.8 (03 Oct 2020 06:29), Max: 37 (03 Oct 2020 01:57)  T(F): 98.3 (03 Oct 2020 06:29), Max: 98.6 (03 Oct 2020 01:57)  HR: 94 (03 Oct 2020 06:29) (79 - 100)  BP: 149/75 (03 Oct 2020 06:29) (126/75 - 149/75)  BP(mean): --  RR: 19 (03 Oct 2020 06:29) (18 - 20)  SpO2: 99% (03 Oct 2020 06:29) (97% - 100%)    PHYSICAL EXAM:   GENERAL: NAD, well-groomed, well-developed  CHEST/LUNG: Wheeze resolved, lungs CTAB, On RA Satting 100%  HEART: Regular rate and rhythm; No murmurs, rubs, or gallops  ABDOMEN: Soft, Nontender, Nondistended; Bowel sounds present  EXTREMITIES:  well-perfused, No clubbing, cyanosis, + b/l hand swelling  NEURO:  AAOx0, arouses to name, responds to some questions, lethargic, sensory and motor intact  : no primafit, incontinent, urinating    Consultant(s) Notes Reviewed:  [ ] YES  [ ] NO  Care Discussed with Consultants/Other Providers [ x] YES  [ ] NO    10-04    138  |  103  |  9   ----------------------------<  112<H>  3.6   |  25  |  0.72    Ca    8.9      04 Oct 2020 11:37  Phos  2.6     10-04  Mg     1.6     10-04      Magnesium, Serum: 1.6 mg/dL (10-04-20 @ 11:37)    Phosphorus Level, Serum: 2.6 mg/dL (10-04-20 @ 11:37)                          11.7   6.17  )-----------( 265      ( 04 Oct 2020 11:37 )             36.1

## 2021-03-03 ENCOUNTER — APPOINTMENT (OUTPATIENT)
Dept: ELECTROPHYSIOLOGY | Facility: CLINIC | Age: 86
End: 2021-03-03
Payer: MEDICAID

## 2021-03-03 VITALS — DIASTOLIC BLOOD PRESSURE: 82 MMHG | SYSTOLIC BLOOD PRESSURE: 136 MMHG

## 2021-03-03 PROCEDURE — 93280 PM DEVICE PROGR EVAL DUAL: CPT

## 2021-03-03 PROCEDURE — 99072 ADDL SUPL MATRL&STAF TM PHE: CPT

## 2021-09-03 ENCOUNTER — APPOINTMENT (OUTPATIENT)
Dept: ELECTROPHYSIOLOGY | Facility: CLINIC | Age: 86
End: 2021-09-03

## 2021-09-14 ENCOUNTER — APPOINTMENT (OUTPATIENT)
Dept: ELECTROPHYSIOLOGY | Facility: CLINIC | Age: 86
End: 2021-09-14
Payer: MEDICAID

## 2021-09-14 VITALS — DIASTOLIC BLOOD PRESSURE: 70 MMHG | HEART RATE: 82 BPM | SYSTOLIC BLOOD PRESSURE: 126 MMHG

## 2021-09-14 PROCEDURE — 93280 PM DEVICE PROGR EVAL DUAL: CPT

## 2021-09-14 RX ORDER — AMLODIPINE BESYLATE 5 MG/1
5 TABLET ORAL DAILY
Refills: 0 | Status: ACTIVE | COMMUNITY

## 2021-09-14 RX ORDER — METOPROLOL SUCCINATE 50 MG/1
50 TABLET, EXTENDED RELEASE ORAL DAILY
Refills: 0 | Status: ACTIVE | COMMUNITY

## 2021-11-24 NOTE — ED PROVIDER NOTE - ENMT NEGATIVE STATEMENT, MLM
Ears: no ear pain and no hearing problems.Nose: no nasal congestion and no nasal drainage.Mouth/Throat: no dysphagia, no hoarseness and no throat pain.Neck: no lumps, no pain, no stiffness and no swollen glands. H Plasty Text: Given the location of the defect, shape of the defect and the proximity to free margins a H-plasty was deemed most appropriate for repair.  Using a sterile surgical marker, the appropriate advancement arms of the H-plasty were drawn incorporating the defect and placing the expected incisions within the relaxed skin tension lines where possible. The area thus outlined was incised deep to adipose tissue with a #15 scalpel blade. The skin margins were undermined to an appropriate distance in all directions utilizing iris scissors.  The opposing advancement arms were then advanced into place in opposite direction and anchored with interrupted buried subcutaneous sutures.

## 2022-04-15 ENCOUNTER — APPOINTMENT (OUTPATIENT)
Dept: ELECTROPHYSIOLOGY | Facility: CLINIC | Age: 87
End: 2022-04-15
Payer: MEDICAID

## 2022-04-15 VITALS — SYSTOLIC BLOOD PRESSURE: 108 MMHG | DIASTOLIC BLOOD PRESSURE: 73 MMHG | RESPIRATION RATE: 14 BRPM | HEART RATE: 76 BPM

## 2022-04-15 PROCEDURE — 93280 PM DEVICE PROGR EVAL DUAL: CPT

## 2022-10-14 ENCOUNTER — APPOINTMENT (OUTPATIENT)
Dept: ELECTROPHYSIOLOGY | Facility: CLINIC | Age: 87
End: 2022-10-14

## 2022-10-14 VITALS — HEART RATE: 86 BPM | DIASTOLIC BLOOD PRESSURE: 88 MMHG | SYSTOLIC BLOOD PRESSURE: 174 MMHG | RESPIRATION RATE: 14 BRPM

## 2022-10-14 PROCEDURE — 93280 PM DEVICE PROGR EVAL DUAL: CPT

## 2022-12-04 ENCOUNTER — INPATIENT (INPATIENT)
Facility: HOSPITAL | Age: 87
LOS: 4 days | Discharge: HOSPICE | End: 2022-12-09
Attending: INTERNAL MEDICINE | Admitting: INTERNAL MEDICINE

## 2022-12-04 VITALS
SYSTOLIC BLOOD PRESSURE: 138 MMHG | OXYGEN SATURATION: 84 % | HEART RATE: 98 BPM | DIASTOLIC BLOOD PRESSURE: 116 MMHG | RESPIRATION RATE: 30 BRPM

## 2022-12-04 DIAGNOSIS — A41.9 SEPSIS, UNSPECIFIED ORGANISM: ICD-10-CM

## 2022-12-04 DIAGNOSIS — S72.91XA UNSPECIFIED FRACTURE OF RIGHT FEMUR, INITIAL ENCOUNTER FOR CLOSED FRACTURE: Chronic | ICD-10-CM

## 2022-12-04 LAB
ALBUMIN SERPL ELPH-MCNC: 3.6 G/DL — SIGNIFICANT CHANGE UP (ref 3.3–5)
ALP SERPL-CCNC: 211 U/L — HIGH (ref 40–120)
ALT FLD-CCNC: 52 U/L — HIGH (ref 4–33)
ANION GAP SERPL CALC-SCNC: 13 MMOL/L — SIGNIFICANT CHANGE UP (ref 7–14)
APPEARANCE UR: CLEAR — SIGNIFICANT CHANGE UP
APTT BLD: 27.1 SEC — SIGNIFICANT CHANGE UP (ref 27–36.3)
AST SERPL-CCNC: 34 U/L — HIGH (ref 4–32)
B PERT DNA SPEC QL NAA+PROBE: SIGNIFICANT CHANGE UP
B PERT+PARAPERT DNA PNL SPEC NAA+PROBE: SIGNIFICANT CHANGE UP
BACTERIA # UR AUTO: NEGATIVE — SIGNIFICANT CHANGE UP
BASE EXCESS BLDV CALC-SCNC: -1.5 MMOL/L — SIGNIFICANT CHANGE UP (ref -2–3)
BASE EXCESS BLDV CALC-SCNC: 0.8 MMOL/L — SIGNIFICANT CHANGE UP (ref -2–3)
BASOPHILS # BLD AUTO: 0.04 K/UL — SIGNIFICANT CHANGE UP (ref 0–0.2)
BASOPHILS NFR BLD AUTO: 0.4 % — SIGNIFICANT CHANGE UP (ref 0–2)
BILIRUB SERPL-MCNC: 0.6 MG/DL — SIGNIFICANT CHANGE UP (ref 0.2–1.2)
BILIRUB UR-MCNC: NEGATIVE — SIGNIFICANT CHANGE UP
BLOOD GAS VENOUS COMPREHENSIVE RESULT: SIGNIFICANT CHANGE UP
BLOOD GAS VENOUS COMPREHENSIVE RESULT: SIGNIFICANT CHANGE UP
BORDETELLA PARAPERTUSSIS (RAPRVP): SIGNIFICANT CHANGE UP
BUN SERPL-MCNC: 20 MG/DL — SIGNIFICANT CHANGE UP (ref 7–23)
C PNEUM DNA SPEC QL NAA+PROBE: SIGNIFICANT CHANGE UP
CALCIUM SERPL-MCNC: 9.7 MG/DL — SIGNIFICANT CHANGE UP (ref 8.4–10.5)
CHLORIDE BLDV-SCNC: 102 MMOL/L — SIGNIFICANT CHANGE UP (ref 96–108)
CHLORIDE BLDV-SCNC: 105 MMOL/L — SIGNIFICANT CHANGE UP (ref 96–108)
CHLORIDE SERPL-SCNC: 100 MMOL/L — SIGNIFICANT CHANGE UP (ref 98–107)
CO2 BLDV-SCNC: 25 MMOL/L — SIGNIFICANT CHANGE UP (ref 22–26)
CO2 BLDV-SCNC: 28 MMOL/L — HIGH (ref 22–26)
CO2 SERPL-SCNC: 24 MMOL/L — SIGNIFICANT CHANGE UP (ref 22–31)
COLOR SPEC: YELLOW — SIGNIFICANT CHANGE UP
CREAT SERPL-MCNC: 0.87 MG/DL — SIGNIFICANT CHANGE UP (ref 0.5–1.3)
DIFF PNL FLD: ABNORMAL
EGFR: 63 ML/MIN/1.73M2 — SIGNIFICANT CHANGE UP
EOSINOPHIL # BLD AUTO: 0 K/UL — SIGNIFICANT CHANGE UP (ref 0–0.5)
EOSINOPHIL NFR BLD AUTO: 0 % — SIGNIFICANT CHANGE UP (ref 0–6)
FLUAV SUBTYP SPEC NAA+PROBE: SIGNIFICANT CHANGE UP
FLUBV RNA SPEC QL NAA+PROBE: SIGNIFICANT CHANGE UP
GAS PNL BLDV: 131 MMOL/L — LOW (ref 136–145)
GAS PNL BLDV: 133 MMOL/L — LOW (ref 136–145)
GLUCOSE BLDV-MCNC: 105 MG/DL — HIGH (ref 70–99)
GLUCOSE BLDV-MCNC: 152 MG/DL — HIGH (ref 70–99)
GLUCOSE SERPL-MCNC: 136 MG/DL — HIGH (ref 70–99)
GLUCOSE UR QL: NEGATIVE — SIGNIFICANT CHANGE UP
HADV DNA SPEC QL NAA+PROBE: SIGNIFICANT CHANGE UP
HCO3 BLDV-SCNC: 24 MMOL/L — SIGNIFICANT CHANGE UP (ref 22–29)
HCO3 BLDV-SCNC: 27 MMOL/L — SIGNIFICANT CHANGE UP (ref 22–29)
HCOV 229E RNA SPEC QL NAA+PROBE: SIGNIFICANT CHANGE UP
HCOV HKU1 RNA SPEC QL NAA+PROBE: SIGNIFICANT CHANGE UP
HCOV NL63 RNA SPEC QL NAA+PROBE: SIGNIFICANT CHANGE UP
HCOV OC43 RNA SPEC QL NAA+PROBE: SIGNIFICANT CHANGE UP
HCT VFR BLD CALC: 42.8 % — SIGNIFICANT CHANGE UP (ref 34.5–45)
HCT VFR BLDA CALC: 35 % — SIGNIFICANT CHANGE UP (ref 34.5–46.5)
HCT VFR BLDA CALC: 42 % — SIGNIFICANT CHANGE UP (ref 34.5–46.5)
HGB BLD CALC-MCNC: 11.7 G/DL — SIGNIFICANT CHANGE UP (ref 11.5–15.5)
HGB BLD CALC-MCNC: 13.9 G/DL — SIGNIFICANT CHANGE UP (ref 11.5–15.5)
HGB BLD-MCNC: 13.9 G/DL — SIGNIFICANT CHANGE UP (ref 11.5–15.5)
HMPV RNA SPEC QL NAA+PROBE: SIGNIFICANT CHANGE UP
HPIV1 RNA SPEC QL NAA+PROBE: SIGNIFICANT CHANGE UP
HPIV2 RNA SPEC QL NAA+PROBE: SIGNIFICANT CHANGE UP
HPIV3 RNA SPEC QL NAA+PROBE: SIGNIFICANT CHANGE UP
HPIV4 RNA SPEC QL NAA+PROBE: SIGNIFICANT CHANGE UP
IANC: 8.72 K/UL — HIGH (ref 1.8–7.4)
IMM GRANULOCYTES NFR BLD AUTO: 0.5 % — SIGNIFICANT CHANGE UP (ref 0–0.9)
INR BLD: 1.16 RATIO — SIGNIFICANT CHANGE UP (ref 0.88–1.16)
KETONES UR-MCNC: NEGATIVE — SIGNIFICANT CHANGE UP
LACTATE BLDV-MCNC: 1.3 MMOL/L — SIGNIFICANT CHANGE UP (ref 0.5–2)
LACTATE BLDV-MCNC: 3.3 MMOL/L — HIGH (ref 0.5–2)
LACTATE SERPL-SCNC: 1.3 MMOL/L — SIGNIFICANT CHANGE UP (ref 0.5–2)
LEUKOCYTE ESTERASE UR-ACNC: NEGATIVE — SIGNIFICANT CHANGE UP
LYMPHOCYTES # BLD AUTO: 1.47 K/UL — SIGNIFICANT CHANGE UP (ref 1–3.3)
LYMPHOCYTES # BLD AUTO: 13.3 % — SIGNIFICANT CHANGE UP (ref 13–44)
M PNEUMO DNA SPEC QL NAA+PROBE: SIGNIFICANT CHANGE UP
MCHC RBC-ENTMCNC: 31.2 PG — SIGNIFICANT CHANGE UP (ref 27–34)
MCHC RBC-ENTMCNC: 32.5 GM/DL — SIGNIFICANT CHANGE UP (ref 32–36)
MCV RBC AUTO: 96 FL — SIGNIFICANT CHANGE UP (ref 80–100)
MONOCYTES # BLD AUTO: 0.77 K/UL — SIGNIFICANT CHANGE UP (ref 0–0.9)
MONOCYTES NFR BLD AUTO: 7 % — SIGNIFICANT CHANGE UP (ref 2–14)
NEUTROPHILS # BLD AUTO: 8.72 K/UL — HIGH (ref 1.8–7.4)
NEUTROPHILS NFR BLD AUTO: 78.8 % — HIGH (ref 43–77)
NITRITE UR-MCNC: NEGATIVE — SIGNIFICANT CHANGE UP
NRBC # BLD: 0 /100 WBCS — SIGNIFICANT CHANGE UP (ref 0–0)
NRBC # FLD: 0 K/UL — SIGNIFICANT CHANGE UP (ref 0–0)
OB PNL STL: NEGATIVE — SIGNIFICANT CHANGE UP
PCO2 BLDV: 41 MMHG — SIGNIFICANT CHANGE UP (ref 39–42)
PCO2 BLDV: 46 MMHG — HIGH (ref 39–42)
PH BLDV: 7.37 — SIGNIFICANT CHANGE UP (ref 7.32–7.43)
PH BLDV: 7.37 — SIGNIFICANT CHANGE UP (ref 7.32–7.43)
PH UR: 5.5 — SIGNIFICANT CHANGE UP (ref 5–8)
PLATELET # BLD AUTO: 287 K/UL — SIGNIFICANT CHANGE UP (ref 150–400)
PO2 BLDV: 35 MMHG — SIGNIFICANT CHANGE UP
PO2 BLDV: 76 MMHG — SIGNIFICANT CHANGE UP
POTASSIUM BLDV-SCNC: 4.2 MMOL/L — SIGNIFICANT CHANGE UP (ref 3.5–5.1)
POTASSIUM BLDV-SCNC: 4.9 MMOL/L — SIGNIFICANT CHANGE UP (ref 3.5–5.1)
POTASSIUM SERPL-MCNC: 4.8 MMOL/L — SIGNIFICANT CHANGE UP (ref 3.5–5.3)
POTASSIUM SERPL-SCNC: 4.8 MMOL/L — SIGNIFICANT CHANGE UP (ref 3.5–5.3)
PROT SERPL-MCNC: 8.5 G/DL — HIGH (ref 6–8.3)
PROT UR-MCNC: ABNORMAL
PROTHROM AB SERPL-ACNC: 13.5 SEC — HIGH (ref 10.5–13.4)
RAPID RVP RESULT: SIGNIFICANT CHANGE UP
RBC # BLD: 4.46 M/UL — SIGNIFICANT CHANGE UP (ref 3.8–5.2)
RBC # FLD: 13.5 % — SIGNIFICANT CHANGE UP (ref 10.3–14.5)
RBC CASTS # UR COMP ASSIST: SIGNIFICANT CHANGE UP /HPF (ref 0–4)
RSV RNA SPEC QL NAA+PROBE: SIGNIFICANT CHANGE UP
RV+EV RNA SPEC QL NAA+PROBE: SIGNIFICANT CHANGE UP
SAO2 % BLDV: 57.6 % — SIGNIFICANT CHANGE UP
SAO2 % BLDV: 96 % — SIGNIFICANT CHANGE UP
SARS-COV-2 RNA SPEC QL NAA+PROBE: SIGNIFICANT CHANGE UP
SODIUM SERPL-SCNC: 137 MMOL/L — SIGNIFICANT CHANGE UP (ref 135–145)
SP GR SPEC: 1.02 — SIGNIFICANT CHANGE UP (ref 1.01–1.05)
TROPONIN T, HIGH SENSITIVITY RESULT: 63 NG/L — CRITICAL HIGH
TROPONIN T, HIGH SENSITIVITY RESULT: 70 NG/L — CRITICAL HIGH
TROPONIN T, HIGH SENSITIVITY RESULT: 75 NG/L — CRITICAL HIGH
TSH SERPL-MCNC: 3.16 UIU/ML — SIGNIFICANT CHANGE UP (ref 0.27–4.2)
UROBILINOGEN FLD QL: SIGNIFICANT CHANGE UP
WBC # BLD: 11.05 K/UL — HIGH (ref 3.8–10.5)
WBC # FLD AUTO: 11.05 K/UL — HIGH (ref 3.8–10.5)
WBC UR QL: SIGNIFICANT CHANGE UP /HPF (ref 0–5)

## 2022-12-04 PROCEDURE — 99285 EMERGENCY DEPT VISIT HI MDM: CPT

## 2022-12-04 PROCEDURE — 99223 1ST HOSP IP/OBS HIGH 75: CPT

## 2022-12-04 PROCEDURE — 70450 CT HEAD/BRAIN W/O DYE: CPT | Mod: 26,MG

## 2022-12-04 PROCEDURE — 71275 CT ANGIOGRAPHY CHEST: CPT | Mod: 26,MG

## 2022-12-04 PROCEDURE — 71045 X-RAY EXAM CHEST 1 VIEW: CPT | Mod: 26

## 2022-12-04 PROCEDURE — G1004: CPT

## 2022-12-04 RX ORDER — ACETAMINOPHEN 500 MG
1000 TABLET ORAL ONCE
Refills: 0 | Status: COMPLETED | OUTPATIENT
Start: 2022-12-04 | End: 2022-12-04

## 2022-12-04 RX ORDER — SODIUM CHLORIDE 9 MG/ML
500 INJECTION INTRAMUSCULAR; INTRAVENOUS; SUBCUTANEOUS ONCE
Refills: 0 | Status: COMPLETED | OUTPATIENT
Start: 2022-12-04 | End: 2022-12-04

## 2022-12-04 RX ORDER — PIPERACILLIN AND TAZOBACTAM 4; .5 G/20ML; G/20ML
3.38 INJECTION, POWDER, LYOPHILIZED, FOR SOLUTION INTRAVENOUS ONCE
Refills: 0 | Status: COMPLETED | OUTPATIENT
Start: 2022-12-04 | End: 2022-12-04

## 2022-12-04 RX ADMIN — SODIUM CHLORIDE 500 MILLILITER(S): 9 INJECTION INTRAMUSCULAR; INTRAVENOUS; SUBCUTANEOUS at 17:27

## 2022-12-04 RX ADMIN — Medication 400 MILLIGRAM(S): at 17:27

## 2022-12-04 RX ADMIN — PIPERACILLIN AND TAZOBACTAM 200 GRAM(S): 4; .5 INJECTION, POWDER, LYOPHILIZED, FOR SOLUTION INTRAVENOUS at 17:27

## 2022-12-04 NOTE — ED PROVIDER NOTE - OBJECTIVE STATEMENT
Dr Darden   89yoF hx hypothyroidism, AF, CHB s/p PPM, Parkinson disease, asthma, and recurrent UTI's : Patient comes from home with son chief complaint "she was shaking too much and cannot open her right hand" son states that she has a history of "shaking for months" but it has worsened over the last couple days, in the past when she has severe shaking" she has had a UTI per her son he is also concerned that she has not opened her right hand in a month.  Denies any history of fever at home.  Denies any nausea vomiting diarrhea.  No falls.  At baseline patient is alert to name, verbal sometimes, fully nonverbal and bedbound per son.

## 2022-12-04 NOTE — ED PROVIDER NOTE - CLINICAL SUMMARY MEDICAL DECISION MAKING FREE TEXT BOX
plan Sepsis labs, EKG, x-ray RVP, UA, CT head, CT chest rule out PE, Tylenol, gentle IV fluids, IV antibiotics and to be admitted

## 2022-12-04 NOTE — ED ADULT NURSE NOTE - NSIMPLEMENTINTERV_GEN_ALL_ED
Implemented All Fall with Harm Risk Interventions:  Miranda to call system. Call bell, personal items and telephone within reach. Instruct patient to call for assistance. Room bathroom lighting operational. Non-slip footwear when patient is off stretcher. Physically safe environment: no spills, clutter or unnecessary equipment. Stretcher in lowest position, wheels locked, appropriate side rails in place. Provide visual cue, wrist band, yellow gown, etc. Monitor gait and stability. Monitor for mental status changes and reorient to person, place, and time. Review medications for side effects contributing to fall risk. Reinforce activity limits and safety measures with patient and family. Provide visual clues: red socks.

## 2022-12-04 NOTE — H&P ADULT - ASSESSMENT
89yoF hx hypothyroidism, AF, CHB s/p PPM, Parkinson disease, asthma, and recurrent UTI's admitted for sepsis

## 2022-12-04 NOTE — ED ADULT NURSE NOTE - OBJECTIVE STATEMENT
pt A&ox0 at baseline, minimally verbal. brought to ED by family for "shaking to much" and right hand pressure injury. abdomen is soft, flat, and nontender. pt has no pmh of seizures. pt minimally responsive to family who is at bedside. breathing is spontaneous and unlabored. sating 99% on RA. bilateral pedal and radial pulses palpable and strong. right 18 US IV and left hand 20g IV placed Labs drawn and sent as per ordered. skin is dry, clean, and intact. pressure injury noted to right thumb.  14f singh ordered placed in ED as per ordered, draining yellow clear urine. Bed in lowest position, call bell within reach, all other safety and comfort measures provided. awaiting labs results and further orders.

## 2022-12-04 NOTE — ED ADULT TRIAGE NOTE - CHIEF COMPLAINT QUOTE
pt coming from home with family.  pt required assistance getting taken out of the car.  as per family, pt "has a UTI". pt is tremulous and responsive to tactile stimuli.  pt tachypneic.  pt taken to [facility]

## 2022-12-04 NOTE — ED PROVIDER NOTE - PROGRESS NOTE DETAILS
pt more comfortable. HR 87 RR 18 dec WOB. pending ekg and trop. Initially EKG too much artifact due to WOB. Patient more comfortable and less shaky.  decrease WOB>  Daughter at bedside heart rate 74 EKG sinus unchanged from a prior EKG in the system.  Pending repeat troponin and CTs to be done. Parveen: patient signed out as fever, tachy, shaky with ?decreased mental status. no PE, no ICH, currently HDS. treated clinically as pna, spoke with hosp, given elevated trop would like her on tele, rpt trop @11, add on bnp and procal. No longer tachy, no need for further fluids at this time based on clinical appearance.

## 2022-12-04 NOTE — H&P ADULT - HISTORY OF PRESENT ILLNESS
89yoF hx hypothyroidism, AF, CHB s/p PPM, Parkinson disease, asthma, and recurrent UTI's p/w shaking. Per pt grandsons (HCP) pt has been having increase shaking for the past few months and has rigidity in her right and keeps it in a fist. it has gotten worse in the past 2 days and they were told to go to the ED. States that she shaan low grade fever of  at home. Denies any coughing with food. Per son, pt had similar symptoms in 2020 when they found she had a UTI.  No nausea or vomiting. Pt at baseline is full nonverbal and bedbound per son.     In the ED, pt was febrile to 100.7, , /99 and saturating well on RA.  89yoF hx hypothyroidism, AF, CHB s/p PPM, Parkinson disease, asthma, and recurrent UTI's p/w shaking. Per pt grandsons (HCP) pt has been having increase shaking for the past few months and has rigidity in her right and keeps it in a fist. it has gotten worse in the past 2 days and they were told to go to the ED. States that she shaan low grade fever of  at home. Denies any coughing with food. Per grandson, pt had similar symptoms in 2020 when they found she had a UTI.  No nausea or vomiting. Pt at baseline is full nonverbal and bedbound per son.     In the ED, pt was febrile to 100.7, , /99 and saturating well on RA.

## 2022-12-04 NOTE — H&P ADULT - NSHPLABSRESULTS_GEN_ALL_CORE
.  LABS:                         13.9   11.05 )-----------( 287      ( 04 Dec 2022 17:20 )             42.8         137  |  100  |  20  ----------------------------<  136<H>  4.8   |  24  |  0.87    Ca    9.7      04 Dec 2022 17:20    TPro  8.5<H>  /  Alb  3.6  /  TBili  0.6  /  DBili  x   /  AST  34<H>  /  ALT  52<H>  /  AlkPhos  211<H>      PT/INR - ( 04 Dec 2022 17:20 )   PT: 13.5 sec;   INR: 1.16 ratio         PTT - ( 04 Dec 2022 17:20 )  PTT:27.1 sec  Urinalysis Basic - ( 04 Dec 2022 17:20 )    Color: Yellow / Appearance: Clear / S.023 / pH: x  Gluc: x / Ketone: Negative  / Bili: Negative / Urobili: <2 mg/dL   Blood: x / Protein: 30 mg/dL / Nitrite: Negative   Leuk Esterase: Negative / RBC: 11-25 /HPF / WBC 0-2 /HPF   Sq Epi: x / Non Sq Epi: x / Bacteria: Negative      Serum Pro-Brain Natriuretic Peptide: 1851 pg/mL ( @ 20:12)    Lactate, Blood: 1.3 mmol/L ( @ 22:11)      RADIOLOGY, EKG & ADDITIONAL TESTS: Reviewed.

## 2022-12-04 NOTE — H&P ADULT - NSHPPHYSICALEXAM_GEN_ALL_CORE
LOS: 1d    VITALS:   T(C): 37.2 (12-04-22 @ 22:13), Max: 38.2 (12-04-22 @ 17:17)  HR: 80 (12-04-22 @ 22:13) (80 - 120)  BP: 142/74 (12-04-22 @ 22:13) (138/116 - 147/99)  RR: 18 (12-04-22 @ 22:13) (18 - 30)  SpO2: 100% (12-04-22 @ 22:13) (84% - 100%)    GENERAL: NAD, lying in bed comfortably  HEAD:  Atraumatic, Normocephalic  EYES: EOMI, PERRLA, conjunctiva and sclera clear  ENT: Moist mucous membranes  NECK: Supple, No JVD  CHEST/LUNG: Clear to auscultation bilaterally; No rales, rhonchi, wheezing, or rubs. Unlabored respirations  HEART: Regular rate and rhythm; No murmurs, rubs, or gallops  ABDOMEN: BSx4; Soft, nontender, nondistended  EXTREMITIES:  2+ Peripheral Pulses, brisk capillary refill. No clubbing, cyanosis, or edema  NERVOUS SYSTEM:  A&Ox0, non verbal. No tremors at rest. R. hand stiffness in a fist    SKIN: No rashes or lesions  Psych: Unable to assess

## 2022-12-04 NOTE — H&P ADULT - PROBLEM SELECTOR PLAN 3
-Will need official med rec in the AM -Will need to get official med rec in the AM  -Will need neurology consult in the AM

## 2022-12-04 NOTE — H&P ADULT - ATTENDING COMMENTS
89 yo f with PD brought in by family out of concern for increased tremulous activity and rigidity. Pt deemed high aspiration risk in past but family has opted to continue with pleasure feeds; Unclear if worsening PD symptoms stemming from waning med efficacy vs suboptimal po intake. Pt also with 100.7 (R), wbc 11k. CTA chest neg for PE or infectious focus, UA, RVP neg; monitor off abx; obtain  bedside and formal swallow eval.  family to bring in list of meds in am. Consider neurology eval. Moderate troponinemia of unclear significance; continue to trend, readable ekg pending. Pt hemodynamically stable, NAD

## 2022-12-04 NOTE — H&P ADULT - PROBLEM SELECTOR PLAN 2
-Pt with elevated trop on admission 63 -> 70 -> 75. No EKG changes, likely 2/2 demand ischemia  -Will cont to trend until downtrending -Pt with elevated trop on admission 63 -> 70 -> 75. No EKG changes from last admission. Will get repeat EKG to evaluate.  -Will cont to trend until downtrending

## 2022-12-04 NOTE — ED PROVIDER NOTE - PHYSICAL EXAMINATION
Dr Darden  .Elderly female laying in bed with eyes closed, no sign of head or facial trauma   Tachycardic to the 110's, tachypneic to the 30s, coarse breath   sounds bilaterally, soft nondistended no grimace to palpation.    No sacral ulcer noted skin intact.    Rectal temp 100.7 brown stool.    No leg swelling.    No ulcers of lower extremities noted no rashes.  Right hand clenched.  Crossed lower extremities bilaterally

## 2022-12-04 NOTE — H&P ADULT - PROBLEM SELECTOR PLAN 1
-Pt p/w fever, tachy and leukocytosis  -UA negative, RVP negative, CXR with no consolidation. Procal 0.07  -CTA negative for PE   -Pt was previously evaluated for S&S in 2020 with contraindication for PO diet. Extensive conversation with family with decision for pleasure feeds  -Pt septic likely 2/2 aspiration PNA  -Family agree to keep pt NPO will S&S eval in the AM  -Will cont pt on IV zosyn  -BCx and UCx pending -Pt p/w fever, tachy and leukocytosis  -UA negative, RVP negative, CXR with no consolidation. Procal 0.07  -CTA negative for PE   -Pt was previously evaluated for S&S in 2020 with contraindication for PO diet. Extensive conversation with family with decision for pleasure feeds  -Pt septic likely 2/2 aspiration PNA  -Family agree to keep pt NPO will S&S eval in the AM  -Will hold off on abx for now. Monitor fever and wbc curve   -BCx and UCx pending -Pt p/w fever, tachy and leukocytosis  -UA negative, RVP negative, CXR with no consolidation. Procal 0.07  -CTA negative for PE   -Pt was previously evaluated for S&S in 2020 with contraindication for PO diet. Extensive conversation with family with decision for pleasure feeds  -Pt septic likely 2/2 aspiration PNA  -Pt failed bedside bedside swallow eval. Family agree to keep pt NPO will S&S eval in the AM  -Will hold off on abx for now. Monitor fever and wbc curve   -BCx and UCx pending

## 2022-12-05 DIAGNOSIS — Z29.9 ENCOUNTER FOR PROPHYLACTIC MEASURES, UNSPECIFIED: ICD-10-CM

## 2022-12-05 DIAGNOSIS — I10 ESSENTIAL (PRIMARY) HYPERTENSION: ICD-10-CM

## 2022-12-05 DIAGNOSIS — E03.9 HYPOTHYROIDISM, UNSPECIFIED: ICD-10-CM

## 2022-12-05 DIAGNOSIS — I48.91 UNSPECIFIED ATRIAL FIBRILLATION: ICD-10-CM

## 2022-12-05 DIAGNOSIS — A41.9 SEPSIS, UNSPECIFIED ORGANISM: ICD-10-CM

## 2022-12-05 DIAGNOSIS — E87.1 HYPO-OSMOLALITY AND HYPONATREMIA: ICD-10-CM

## 2022-12-05 DIAGNOSIS — G20 PARKINSON'S DISEASE: ICD-10-CM

## 2022-12-05 DIAGNOSIS — R50.9 FEVER, UNSPECIFIED: ICD-10-CM

## 2022-12-05 DIAGNOSIS — R77.8 OTHER SPECIFIED ABNORMALITIES OF PLASMA PROTEINS: ICD-10-CM

## 2022-12-05 LAB
ALBUMIN SERPL ELPH-MCNC: 2.8 G/DL — LOW (ref 3.3–5)
ALP SERPL-CCNC: 170 U/L — HIGH (ref 40–120)
ALT FLD-CCNC: 40 U/L — HIGH (ref 4–33)
ANION GAP SERPL CALC-SCNC: 10 MMOL/L — SIGNIFICANT CHANGE UP (ref 7–14)
AST SERPL-CCNC: 35 U/L — HIGH (ref 4–32)
BASOPHILS # BLD AUTO: 0.03 K/UL — SIGNIFICANT CHANGE UP (ref 0–0.2)
BASOPHILS NFR BLD AUTO: 0.3 % — SIGNIFICANT CHANGE UP (ref 0–2)
BILIRUB SERPL-MCNC: 0.6 MG/DL — SIGNIFICANT CHANGE UP (ref 0.2–1.2)
BUN SERPL-MCNC: 16 MG/DL — SIGNIFICANT CHANGE UP (ref 7–23)
CALCIUM SERPL-MCNC: 9 MG/DL — SIGNIFICANT CHANGE UP (ref 8.4–10.5)
CHLORIDE SERPL-SCNC: 101 MMOL/L — SIGNIFICANT CHANGE UP (ref 98–107)
CK MB BLD-MCNC: 4.4 % — HIGH (ref 0–2.5)
CK MB CFR SERPL CALC: 7.5 NG/ML — HIGH
CK SERPL-CCNC: 169 U/L — SIGNIFICANT CHANGE UP (ref 25–170)
CK SERPL-CCNC: 204 U/L — HIGH (ref 25–170)
CO2 SERPL-SCNC: 22 MMOL/L — SIGNIFICANT CHANGE UP (ref 22–31)
CREAT SERPL-MCNC: 0.73 MG/DL — SIGNIFICANT CHANGE UP (ref 0.5–1.3)
CULTURE RESULTS: SIGNIFICANT CHANGE UP
EGFR: 78 ML/MIN/1.73M2 — SIGNIFICANT CHANGE UP
EOSINOPHIL # BLD AUTO: 0.09 K/UL — SIGNIFICANT CHANGE UP (ref 0–0.5)
EOSINOPHIL NFR BLD AUTO: 0.9 % — SIGNIFICANT CHANGE UP (ref 0–6)
GLUCOSE SERPL-MCNC: 92 MG/DL — SIGNIFICANT CHANGE UP (ref 70–99)
HCT VFR BLD CALC: 37.9 % — SIGNIFICANT CHANGE UP (ref 34.5–45)
HGB BLD-MCNC: 12.3 G/DL — SIGNIFICANT CHANGE UP (ref 11.5–15.5)
IANC: 6.88 K/UL — SIGNIFICANT CHANGE UP (ref 1.8–7.4)
IMM GRANULOCYTES NFR BLD AUTO: 0.4 % — SIGNIFICANT CHANGE UP (ref 0–0.9)
LYMPHOCYTES # BLD AUTO: 1.88 K/UL — SIGNIFICANT CHANGE UP (ref 1–3.3)
LYMPHOCYTES # BLD AUTO: 19.4 % — SIGNIFICANT CHANGE UP (ref 13–44)
MAGNESIUM SERPL-MCNC: 1.8 MG/DL — SIGNIFICANT CHANGE UP (ref 1.6–2.6)
MCHC RBC-ENTMCNC: 30.8 PG — SIGNIFICANT CHANGE UP (ref 27–34)
MCHC RBC-ENTMCNC: 32.5 GM/DL — SIGNIFICANT CHANGE UP (ref 32–36)
MCV RBC AUTO: 95 FL — SIGNIFICANT CHANGE UP (ref 80–100)
MONOCYTES # BLD AUTO: 0.78 K/UL — SIGNIFICANT CHANGE UP (ref 0–0.9)
MONOCYTES NFR BLD AUTO: 8 % — SIGNIFICANT CHANGE UP (ref 2–14)
NEUTROPHILS # BLD AUTO: 6.88 K/UL — SIGNIFICANT CHANGE UP (ref 1.8–7.4)
NEUTROPHILS NFR BLD AUTO: 71 % — SIGNIFICANT CHANGE UP (ref 43–77)
NRBC # BLD: 0 /100 WBCS — SIGNIFICANT CHANGE UP (ref 0–0)
NRBC # FLD: 0 K/UL — SIGNIFICANT CHANGE UP (ref 0–0)
NT-PROBNP SERPL-SCNC: 1851 PG/ML — HIGH
PHOSPHATE SERPL-MCNC: 3.5 MG/DL — SIGNIFICANT CHANGE UP (ref 2.5–4.5)
PLATELET # BLD AUTO: 278 K/UL — SIGNIFICANT CHANGE UP (ref 150–400)
POTASSIUM SERPL-MCNC: 4.6 MMOL/L — SIGNIFICANT CHANGE UP (ref 3.5–5.3)
POTASSIUM SERPL-SCNC: 4.6 MMOL/L — SIGNIFICANT CHANGE UP (ref 3.5–5.3)
PROCALCITONIN SERPL-MCNC: 0.07 NG/ML — SIGNIFICANT CHANGE UP (ref 0.02–0.1)
PROT SERPL-MCNC: 7.4 G/DL — SIGNIFICANT CHANGE UP (ref 6–8.3)
RBC # BLD: 3.99 M/UL — SIGNIFICANT CHANGE UP (ref 3.8–5.2)
RBC # FLD: 13.6 % — SIGNIFICANT CHANGE UP (ref 10.3–14.5)
SODIUM SERPL-SCNC: 133 MMOL/L — LOW (ref 135–145)
SPECIMEN SOURCE: SIGNIFICANT CHANGE UP
TROPONIN T, HIGH SENSITIVITY RESULT: 69 NG/L — CRITICAL HIGH
WBC # BLD: 9.7 K/UL — SIGNIFICANT CHANGE UP (ref 3.8–10.5)
WBC # FLD AUTO: 9.7 K/UL — SIGNIFICANT CHANGE UP (ref 3.8–10.5)

## 2022-12-05 PROCEDURE — 99223 1ST HOSP IP/OBS HIGH 75: CPT

## 2022-12-05 PROCEDURE — 99222 1ST HOSP IP/OBS MODERATE 55: CPT

## 2022-12-05 RX ORDER — LEVOTHYROXINE SODIUM 125 MCG
1 TABLET ORAL
Qty: 0 | Refills: 0 | DISCHARGE

## 2022-12-05 RX ORDER — HYDRALAZINE HCL 50 MG
5 TABLET ORAL ONCE
Refills: 0 | Status: COMPLETED | OUTPATIENT
Start: 2022-12-05 | End: 2022-12-05

## 2022-12-05 RX ORDER — ALBUTEROL 90 UG/1
2 AEROSOL, METERED ORAL
Qty: 0 | Refills: 0 | DISCHARGE

## 2022-12-05 RX ORDER — PIPERACILLIN AND TAZOBACTAM 4; .5 G/20ML; G/20ML
3.38 INJECTION, POWDER, LYOPHILIZED, FOR SOLUTION INTRAVENOUS ONCE
Refills: 0 | Status: COMPLETED | OUTPATIENT
Start: 2022-12-05 | End: 2022-12-05

## 2022-12-05 RX ORDER — PIPERACILLIN AND TAZOBACTAM 4; .5 G/20ML; G/20ML
3.38 INJECTION, POWDER, LYOPHILIZED, FOR SOLUTION INTRAVENOUS EVERY 8 HOURS
Refills: 0 | Status: DISCONTINUED | OUTPATIENT
Start: 2022-12-06 | End: 2022-12-09

## 2022-12-05 RX ORDER — IPRATROPIUM/ALBUTEROL SULFATE 18-103MCG
3 AEROSOL WITH ADAPTER (GRAM) INHALATION EVERY 6 HOURS
Refills: 0 | Status: DISCONTINUED | OUTPATIENT
Start: 2022-12-05 | End: 2022-12-09

## 2022-12-05 RX ORDER — METOPROLOL TARTRATE 50 MG
2.5 TABLET ORAL EVERY 6 HOURS
Refills: 0 | Status: DISCONTINUED | OUTPATIENT
Start: 2022-12-05 | End: 2022-12-05

## 2022-12-05 RX ORDER — LEVOTHYROXINE SODIUM 125 MCG
20 TABLET ORAL
Refills: 0 | Status: DISCONTINUED | OUTPATIENT
Start: 2022-12-05 | End: 2022-12-09

## 2022-12-05 RX ORDER — ENOXAPARIN SODIUM 100 MG/ML
40 INJECTION SUBCUTANEOUS EVERY 24 HOURS
Refills: 0 | Status: DISCONTINUED | OUTPATIENT
Start: 2022-12-05 | End: 2022-12-06

## 2022-12-05 RX ORDER — CHOLECALCIFEROL (VITAMIN D3) 125 MCG
1 CAPSULE ORAL
Qty: 0 | Refills: 0 | DISCHARGE

## 2022-12-05 RX ORDER — PIPERACILLIN AND TAZOBACTAM 4; .5 G/20ML; G/20ML
3.38 INJECTION, POWDER, LYOPHILIZED, FOR SOLUTION INTRAVENOUS ONCE
Refills: 0 | Status: COMPLETED | OUTPATIENT
Start: 2022-12-06 | End: 2022-12-05

## 2022-12-05 RX ORDER — METOPROLOL TARTRATE 50 MG
1 TABLET ORAL
Qty: 0 | Refills: 0 | DISCHARGE

## 2022-12-05 RX ORDER — HYDRALAZINE HCL 50 MG
5 TABLET ORAL EVERY 8 HOURS
Refills: 0 | Status: DISCONTINUED | OUTPATIENT
Start: 2022-12-05 | End: 2022-12-05

## 2022-12-05 RX ORDER — ALBUTEROL 90 UG/1
3 AEROSOL, METERED ORAL
Qty: 0 | Refills: 0 | DISCHARGE

## 2022-12-05 RX ORDER — LEVOTHYROXINE SODIUM 125 MCG
40 TABLET ORAL
Refills: 0 | Status: DISCONTINUED | OUTPATIENT
Start: 2022-12-05 | End: 2022-12-09

## 2022-12-05 RX ORDER — BUDESONIDE AND FORMOTEROL FUMARATE DIHYDRATE 160; 4.5 UG/1; UG/1
2 AEROSOL RESPIRATORY (INHALATION)
Refills: 0 | Status: DISCONTINUED | OUTPATIENT
Start: 2022-12-05 | End: 2022-12-09

## 2022-12-05 RX ORDER — METOPROLOL TARTRATE 50 MG
5 TABLET ORAL EVERY 6 HOURS
Refills: 0 | Status: DISCONTINUED | OUTPATIENT
Start: 2022-12-05 | End: 2022-12-09

## 2022-12-05 RX ORDER — AMLODIPINE BESYLATE 2.5 MG/1
1 TABLET ORAL
Qty: 0 | Refills: 0 | DISCHARGE

## 2022-12-05 RX ORDER — HYDRALAZINE HCL 50 MG
5 TABLET ORAL EVERY 8 HOURS
Refills: 0 | Status: DISCONTINUED | OUTPATIENT
Start: 2022-12-05 | End: 2022-12-09

## 2022-12-05 RX ORDER — MAGNESIUM SULFATE 500 MG/ML
1 VIAL (ML) INJECTION ONCE
Refills: 0 | Status: COMPLETED | OUTPATIENT
Start: 2022-12-05 | End: 2022-12-05

## 2022-12-05 RX ORDER — PIPERACILLIN AND TAZOBACTAM 4; .5 G/20ML; G/20ML
3.38 INJECTION, POWDER, LYOPHILIZED, FOR SOLUTION INTRAVENOUS EVERY 8 HOURS
Refills: 0 | Status: DISCONTINUED | OUTPATIENT
Start: 2022-12-05 | End: 2022-12-05

## 2022-12-05 RX ORDER — BUDESONIDE AND FORMOTEROL FUMARATE DIHYDRATE 160; 4.5 UG/1; UG/1
2 AEROSOL RESPIRATORY (INHALATION)
Qty: 0 | Refills: 0 | DISCHARGE

## 2022-12-05 RX ORDER — SODIUM CHLORIDE 9 MG/ML
1000 INJECTION, SOLUTION INTRAVENOUS
Refills: 0 | Status: DISCONTINUED | OUTPATIENT
Start: 2022-12-05 | End: 2022-12-09

## 2022-12-05 RX ADMIN — Medication 5 MILLIGRAM(S): at 19:52

## 2022-12-05 RX ADMIN — PIPERACILLIN AND TAZOBACTAM 25 GRAM(S): 4; .5 INJECTION, POWDER, LYOPHILIZED, FOR SOLUTION INTRAVENOUS at 19:21

## 2022-12-05 RX ADMIN — Medication 2.5 MILLIGRAM(S): at 09:42

## 2022-12-05 RX ADMIN — PIPERACILLIN AND TAZOBACTAM 25 GRAM(S): 4; .5 INJECTION, POWDER, LYOPHILIZED, FOR SOLUTION INTRAVENOUS at 23:12

## 2022-12-05 RX ADMIN — Medication 100 GRAM(S): at 12:13

## 2022-12-05 RX ADMIN — SODIUM CHLORIDE 75 MILLILITER(S): 9 INJECTION, SOLUTION INTRAVENOUS at 06:48

## 2022-12-05 RX ADMIN — Medication 5 MILLIGRAM(S): at 10:30

## 2022-12-05 RX ADMIN — Medication 5 MILLIGRAM(S): at 13:17

## 2022-12-05 RX ADMIN — Medication 5 MILLIGRAM(S): at 23:07

## 2022-12-05 RX ADMIN — PIPERACILLIN AND TAZOBACTAM 200 GRAM(S): 4; .5 INJECTION, POWDER, LYOPHILIZED, FOR SOLUTION INTRAVENOUS at 15:04

## 2022-12-05 RX ADMIN — Medication 5 MILLIGRAM(S): at 15:04

## 2022-12-05 NOTE — OCCUPATIONAL THERAPY INITIAL EVALUATION ADULT - PERTINENT HX OF CURRENT PROBLEM, REHAB EVAL
Pt is a 89 year old female with hx of hypothyroidism, AF, CHB s/p PPM, Parkinson disease, asthma, and recurrent UTI's, who presented to [facility] on 12/4/22 with increased shaking for the past few months and has rigidity in her right hand and keeps it in a fist that has gotten worse in the past 2 days and they were told to go to the ED, +has a low grade fever of  at home. Pt found with tachycardia and leukocytosis. Pt admitted with concern for sepsis likely from recurrent UTI vs aspiration pneumonitis. Doppler pending for Right hand secondary to noted contraction and finger nails digging into palmar aspect of hand with swelling and concern for wound/ necrosis. CT Head No Contrast on 12/4/22 displayed no acute intracranial abnormalities. Small vessel and atrophic changes. CT Angio Chest on 12/4/22 displayed scattered areas of subsegmental atelectasis within the lungs. No focal alveolar infiltrate/consolidation. Pt is a 89 year old female with hx of hypothyroidism, AF, CHB s/p PPM, Parkinson disease, asthma, and recurrent UTI's, who presented to [facility] on 12/4/22 with increased shaking for the past few months and has rigidity in her right hand and keeps it in a fist that has gotten worse in the past 2 days and they were told to go to the ED, +has a low grade fever of  degrees at home. Pt found with tachycardia and leukocytosis. Pt admitted with concern for sepsis likely from recurrent UTI vs aspiration pneumonitis. Doppler pending for Right hand secondary to noted contraction and finger nails digging into palmar aspect of hand with swelling and concern for wound/ necrosis. CT Head No Contrast on 12/4/22 displayed no acute intracranial abnormalities. Small vessel and atrophic changes. CT Angio Chest on 12/4/22 displayed scattered areas of subsegmental atelectasis within the lungs. No focal alveolar infiltrate/consolidation.

## 2022-12-05 NOTE — PROGRESS NOTE ADULT - SUBJECTIVE AND OBJECTIVE BOX
Patient is a 89y old  Female who presents with a chief complaint of b/l shaking (05 Dec 2022 15:47)    Date of servie : 22 @ 16:29  INTERVAL HPI/OVERNIGHT EVENTS:  T(C): 37.3 (22 @ 13:57), Max: 38.2 (22 @ 17:17)  HR: 92 (22 @ 13:57) (75 - 120)  BP: 179/99 (22 @ 13:57) (140/80 - 197/98)  RR: 18 (22 @ 11:05) (16 - 18)  SpO2: 99% (22 @ 11:05) (98% - 100%)  Wt(kg): --  I&O's Summary      LABS:                        12.3   9.70  )-----------( 278      ( 05 Dec 2022 09:13 )             37.9     12-    133<L>  |  101  |  16  ----------------------------<  92  4.6   |  22  |  0.73    Ca    9.0      05 Dec 2022 09:13  Phos  3.5     12-05  Mg     1.80     12-    TPro  7.4  /  Alb  2.8<L>  /  TBili  0.6  /  DBili  x   /  AST  35<H>  /  ALT  40<H>  /  AlkPhos  170<H>  12-05    PT/INR - ( 04 Dec 2022 17:20 )   PT: 13.5 sec;   INR: 1.16 ratio         PTT - ( 04 Dec 2022 17:20 )  PTT:27.1 sec  Urinalysis Basic - ( 04 Dec 2022 17:20 )    Color: Yellow / Appearance: Clear / S.023 / pH: x  Gluc: x / Ketone: Negative  / Bili: Negative / Urobili: <2 mg/dL   Blood: x / Protein: 30 mg/dL / Nitrite: Negative   Leuk Esterase: Negative / RBC: 11-25 /HPF / WBC 0-2 /HPF   Sq Epi: x / Non Sq Epi: x / Bacteria: Negative      CAPILLARY BLOOD GLUCOSE      POCT Blood Glucose.: 129 mg/dL (04 Dec 2022 17:08)        Urinalysis Basic - ( 04 Dec 2022 17:20 )    Color: Yellow / Appearance: Clear / S.023 / pH: x  Gluc: x / Ketone: Negative  / Bili: Negative / Urobili: <2 mg/dL   Blood: x / Protein: 30 mg/dL / Nitrite: Negative   Leuk Esterase: Negative / RBC: 11-25 /HPF / WBC 0-2 /HPF   Sq Epi: x / Non Sq Epi: x / Bacteria: Negative        MEDICATIONS  (STANDING):  budesonide 160 MICROgram(s)/formoterol 4.5 MICROgram(s) Inhaler 2 Puff(s) Inhalation two times a day  enoxaparin Injectable 40 milliGRAM(s) SubCutaneous every 24 hours  hydrALAZINE Injectable 5 milliGRAM(s) IV Push every 8 hours  lactated ringers. 1000 milliLiter(s) (75 mL/Hr) IV Continuous <Continuous>  levothyroxine Injectable 20 MICROGram(s) IV Push <User Schedule>  levothyroxine Injectable 40 MICROGram(s) IV Push <User Schedule>  metoprolol tartrate Injectable 5 milliGRAM(s) IV Push every 6 hours  piperacillin/tazobactam IVPB.- 3.375 Gram(s) IV Intermittent once    MEDICATIONS  (PRN):  albuterol/ipratropium for Nebulization 3 milliLiter(s) Nebulizer every 6 hours PRN Shortness of Breath and/or Wheezing          PHYSICAL EXAM:  GENERAL: frail  CHEST/LUNG: Clear to percussion bilaterally; No rales, rhonchi, wheezing, or rubs  HEART: Regular rate and rhythm; No murmurs, rubs, or gallops  ABDOMEN: Soft, Nontender, Nondistended; Bowel sounds present  EXTREMITIES:  edema +    Care Discussed with Consultants/Other Providers [x ] YES  [ ] NO

## 2022-12-05 NOTE — SWALLOW BEDSIDE ASSESSMENT ADULT - COMMENTS
H&P 12/4: "89yoF hx hypothyroidism, AF, CHB s/p PPM, Parkinson disease, asthma, and recurrent UTI's admitted for sepsis."    CT Chest 12/4: IMPRESSION: Scattered areas of subsegmental atelectasis within the lungs. No focal alveolar infiltrate/consolidation.    CT Head 12/4: IMPRESSION: No acute intracranial abnormalities. Small vessel and atrophic changes.    Patient seen at bedside in [facility] during clinical swallow evaluation with daughter present this AM. Patient is nonverbal at baseline and daughter speaks Demario. [employer] utilized (#[id number]) for translation. Patient's daughter indicated the patient receives thickened liquids via syringe and teaspoon at baseline, having approximately 2 Ensures a day. Patient's daughter states patient's current mentation is patient's baseline. H&P 12/4: "89yoF hx hypothyroidism, AF, CHB s/p PPM, Parkinson disease, asthma, and recurrent UTI's admitted for sepsis."    CT Chest 12/4: IMPRESSION: Scattered areas of subsegmental atelectasis within the lungs. No focal alveolar infiltrate/consolidation.    CT Head 12/4: IMPRESSION: No acute intracranial abnormalities. Small vessel and atrophic changes.    Patient seen at bedside in [facility] during clinical swallow evaluation with daughter present this AM. Patient is nonverbal at baseline and daughter speaks Demario. [employer] utilized (#[id number]) for translation. Patient's daughter indicated the patient receives thickened liquids via syringe and teaspoon at baseline, having approximately 2 Ensures a day. Patient appears with reduced mentation, Patient's daughter indicates patient's current mentation is patient's baseline.

## 2022-12-05 NOTE — PATIENT PROFILE ADULT - LANGUAGE ASSISTANCE NEEDED
pt not able to use  phone at this time/No-Patient/Caregiver offered and refused free interpretation services.

## 2022-12-05 NOTE — OCCUPATIONAL THERAPY INITIAL EVALUATION ADULT - PATIENT/FAMILY/SIGNIFICANT OTHER GOALS STATEMENT, OT EVAL
Pt unable to formulate a goal secondary to non-verbal. Pt.'s daughter bedside states pt is minimally verbal/mostly non-verbal at baseline.

## 2022-12-05 NOTE — CONSULT NOTE ADULT - ATTENDING COMMENTS
personally saw and examined patient  labs and vitals reviewed  agree with above assessment and plan   low suspicion for acs here  would eval RUE wound, concern for osteo, open wound  hand is contracted, pt may have had a cva prev, cont w/u per primary team

## 2022-12-05 NOTE — SWALLOW BEDSIDE ASSESSMENT ADULT - SWALLOW EVAL: DIAGNOSIS
1- SLP presented coated spoon of moderately thick liquids however patient with reduced retrieval of spoon and no stripping from utensil despite verbal/tactile cues exacerbated by patient's reduced mentation state. Therefore oral and pharyngeal stage of swallow could not be assessed and unable to make additional recommendations at this time.

## 2022-12-05 NOTE — OCCUPATIONAL THERAPY INITIAL EVALUATION ADULT - GENERAL OBSERVATIONS, REHAB EVAL
Pt. received semisupine on stretcher in Emergency Department. No acute distress. +Heplock, +Tele. Daughter bedside. Pt.'s primary language is Demario.  phone utilized (: Alisha #[phone])

## 2022-12-05 NOTE — PATIENT PROFILE ADULT - NSPROMEDSADMININFO_GEN_A_NUR
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 Drinking water. Furosemide helped first few days, however then came back. Mostly at night. Some resolution, but not complete with  leg elevation. \par \par Note: Not on Lisinopril due to cough. \par \par EXSE 02/13/2020: Augustine protocol 6 min - no EKG changes. No WMA. Accelerated HR. \par Resting echo: Normal LV function without significant valvular abnormalities. \par \par Holter 24 hour - SR with rare PVCs\par \par U/S LE Doppler - No DVT. no reflux\par \par EKG Sinus tachy with incomplete RBBB. \par \par Labs 01/2020: CMP WNL; A1C 7.0; Total chol 260; HDL 26; ; LDL (cant calculate).  difficulty swallowing pills

## 2022-12-05 NOTE — ED ADULT NURSE REASSESSMENT NOTE - NS ED NURSE REASSESS COMMENT FT1
pt A&Ox0. pt nonverbal at baseline as per daughter at bedside. pt opens eyes to verbal stimulation. respirations even and unlabored. pt noted to have contracted bilateral upper extremities. deformity noted to R last digit. Perez draining dark yellow urine. BP elevated, pt unable to verbalize sx. pt unable to follow commands for dysphagia screen. ACP CARMINE Hooker made aware, medicated as per ACP orders. will reassess BP in 20 min as per ACP. bed in lowest position, siderails up, family at bedside. pt A&Ox0. pt nonverbal at baseline as per daughter at bedside. pt opens eyes to verbal stimulation. respirations even and unlabored. pt noted to have contracted bilateral upper extremities. deformity noted to R last digit. Perez draining dark yellow urine. BP elevated, pt unable to verbalize sx. pt unable to follow commands for dysphagia screen. ACP CARMINE Hooker made aware, medicated as per ACP orders. will reassess BP in 20 min as per ACP. 20g IV in R AC placed by night shift flushes and draws back with +blood return. bed in lowest position, siderails up, family at bedside.

## 2022-12-05 NOTE — OCCUPATIONAL THERAPY INITIAL EVALUATION ADULT - DIAGNOSIS, OT EVAL
Leukocytosis; r/o Sepsis; r/o UTI vs. Aspiration pneumonitis; r/o Right hand necrosis; Hx of Parkinson's disease

## 2022-12-05 NOTE — OCCUPATIONAL THERAPY INITIAL EVALUATION ADULT - RANGE OF MOTION EXAMINATION, UPPER EXTREMITY
Left UE: Shoulder Flexion Passive ROM 0-40 degrees, Elbow Flexion Passive ROM 0-90 degrees, Wrist ROM limited to neutral, Hand Flexion/Extension Passive ROM WFL; Right UE: Noted shoulder extension contraction vs. pt. resistive to PROM, Elbow Flexion/Extension Passive ROM 20 to 80 degrees, Wrist limited to neutral (+swelling), Hand not appropriate to assess (noted swelling with flexion contracture and open wounds on palmar aspect)

## 2022-12-05 NOTE — CONSULT NOTE ADULT - SUBJECTIVE AND OBJECTIVE BOX
BETHANY JOSHI 89y Female  MRN-[id number]    Patient is a 89y old  Female who presents with a chief complaint of b/l shaking (04 Dec 2022 23:56)      HPI:  89yoF hx hypothyroidism, AF, CHB s/p PPM, Parkinson disease, asthma, and recurrent UTI's p/w shaking. Per pt grandsons (HCP) pt has been having increase shaking for the past few months and has rigidity in her right and keeps it in a fist. it has gotten worse in the past 2 days and they were told to go to the ED. States that she shaan low grade fever of  at home. Denies any coughing with food. Per grandson, pt had similar symptoms in  when they found she had a UTI.  No nausea or vomiting. Pt at baseline is full nonverbal and bedbound per son.     In the ED, pt was febrile to 100.7, , /99 and saturating well on RA.  (04 Dec 2022 23:56)      PAST MEDICAL & SURGICAL HISTORY:  Asthma      Pacemaker  2/2 3rd degree heart block, placed in 3/2016, [facility] PPM      Hypertension      Femur fracture, right          Allergies    No Known Allergies    Intolerances        ANTIMICROBIALS:      MEDICATIONS  (STANDING):  budesonide 160 MICROgram(s)/formoterol 4.5 MICROgram(s) Inhaler 2 Puff(s) Inhalation two times a day  enoxaparin Injectable 40 milliGRAM(s) SubCutaneous every 24 hours  lactated ringers. 1000 milliLiter(s) (75 mL/Hr) IV Continuous <Continuous>  levothyroxine Injectable 20 MICROGram(s) IV Push <User Schedule>  levothyroxine Injectable 40 MICROGram(s) IV Push <User Schedule>  metoprolol tartrate Injectable 5 milliGRAM(s) IV Push every 6 hours      Social History  Smoking:  Etoh:  Drug use:      FAMILY HISTORY:  Family history of asthma (Sibling)        Vital Signs Last 24 Hrs  T(C): 36.7 (05 Dec 2022 09:48), Max: 38.2 (04 Dec 2022 17:17)  T(F): 98.1 (05 Dec 2022 09:48), Max: 100.7 (04 Dec 2022 17:17)  HR: 87 (05 Dec 2022 11:05) (75 - 120)  BP: 176/88 (05 Dec 2022 11:05) (138/116 - 197/98)  BP(mean): --  RR: 18 (05 Dec 2022 11:05) (16 - 30)  SpO2: 99% (05 Dec 2022 11:05) (84% - 100%)    Parameters below as of 05 Dec 2022 11:05  Patient On (Oxygen Delivery Method): room air        CBC Full  -  ( 05 Dec 2022 09:13 )  WBC Count : 9.70 K/uL  RBC Count : 3.99 M/uL  Hemoglobin : 12.3 g/dL  Hematocrit : 37.9 %  Platelet Count - Automated : 278 K/uL  Mean Cell Volume : 95.0 fL  Mean Cell Hemoglobin : 30.8 pg  Mean Cell Hemoglobin Concentration : 32.5 gm/dL  Auto Neutrophil # : 6.88 K/uL  Auto Lymphocyte # : 1.88 K/uL  Auto Monocyte # : 0.78 K/uL  Auto Eosinophil # : 0.09 K/uL  Auto Basophil # : 0.03 K/uL  Auto Neutrophil % : 71.0 %  Auto Lymphocyte % : 19.4 %  Auto Monocyte % : 8.0 %  Auto Eosinophil % : 0.9 %  Auto Basophil % : 0.3 %        133<L>  |  101  |  16  ----------------------------<  92  4.6   |  22  |  0.73    Ca    9.0      05 Dec 2022 09:13  Phos  3.5     12-  Mg     1.80     12-    TPro  7.4  /  Alb  2.8<L>  /  TBili  0.6  /  DBili  x   /  AST  35<H>  /  ALT  40<H>  /  AlkPhos  170<H>  12-    LIVER FUNCTIONS - ( 05 Dec 2022 09:13 )  Alb: 2.8 g/dL / Pro: 7.4 g/dL / ALK PHOS: 170 U/L / ALT: 40 U/L / AST: 35 U/L / GGT: x           Urinalysis Basic - ( 04 Dec 2022 17:20 )    Color: Yellow / Appearance: Clear / S.023 / pH: x  Gluc: x / Ketone: Negative  / Bili: Negative / Urobili: <2 mg/dL   Blood: x / Protein: 30 mg/dL / Nitrite: Negative   Leuk Esterase: Negative / RBC: 11-25 /HPF / WBC 0-2 /HPF   Sq Epi: x / Non Sq Epi: x / Bacteria: Negative        MICROBIOLOGY:          v    Rapid RVP Result: NotDetec ( @ 18:00)            RADIOLOGY   BETHANY JOSHI 89y Female  MRN-[id number]    Patient is a 89y old  Female who presents with a chief complaint of b/l shaking (04 Dec 2022 23:56)      HPI:  89yoF hx hypothyroidism, AF, CHB s/p PPM, Parkinson disease, asthma, and recurrent UTI's p/w shaking. Per pt grandsons (HCP) pt has been having increase shaking for the past few months and has rigidity in her right and keeps it in a fist. it has gotten worse in the past 2 days and they were told to go to the ED. States that she shaan low grade fever of  at home. Denies any coughing with food. Per grandson, pt had similar symptoms in  when they found she had a UTI.  No nausea or vomiting. Pt at baseline is full nonverbal and bedbound per son.     In the ED, pt was febrile to 100.7, , /99 and saturating well on RA.  (04 Dec 2022 23:56)      PAST MEDICAL & SURGICAL HISTORY:  Asthma      Pacemaker  2/2 3rd degree heart block, placed in 3/2016, [facility] PPM      Hypertension      Femur fracture, right          Allergies    No Known Allergies    Intolerances        ANTIMICROBIALS:      MEDICATIONS  (STANDING):  budesonide 160 MICROgram(s)/formoterol 4.5 MICROgram(s) Inhaler 2 Puff(s) Inhalation two times a day  enoxaparin Injectable 40 milliGRAM(s) SubCutaneous every 24 hours  lactated ringers. 1000 milliLiter(s) (75 mL/Hr) IV Continuous <Continuous>  levothyroxine Injectable 20 MICROGram(s) IV Push <User Schedule>  levothyroxine Injectable 40 MICROGram(s) IV Push <User Schedule>  metoprolol tartrate Injectable 5 milliGRAM(s) IV Push every 6 hours      Social History  Smoking: no  Etoh: no  Drug use: no      FAMILY HISTORY:  Family history of asthma (Sibling)        Vital Signs Last 24 Hrs  T(C): 36.7 (05 Dec 2022 09:48), Max: 38.2 (04 Dec 2022 17:17)  T(F): 98.1 (05 Dec 2022 09:48), Max: 100.7 (04 Dec 2022 17:17)  HR: 87 (05 Dec 2022 11:05) (75 - 120)  BP: 176/88 (05 Dec 2022 11:05) (138/116 - 197/98)  BP(mean): --  RR: 18 (05 Dec 2022 11:05) (16 - 30)  SpO2: 99% (05 Dec 2022 11:05) (84% - 100%)    Parameters below as of 05 Dec 2022 11:05  Patient On (Oxygen Delivery Method): room air        CBC Full  -  ( 05 Dec 2022 09:13 )  WBC Count : 9.70 K/uL  RBC Count : 3.99 M/uL  Hemoglobin : 12.3 g/dL  Hematocrit : 37.9 %  Platelet Count - Automated : 278 K/uL  Mean Cell Volume : 95.0 fL  Mean Cell Hemoglobin : 30.8 pg  Mean Cell Hemoglobin Concentration : 32.5 gm/dL  Auto Neutrophil # : 6.88 K/uL  Auto Lymphocyte # : 1.88 K/uL  Auto Monocyte # : 0.78 K/uL  Auto Eosinophil # : 0.09 K/uL  Auto Basophil # : 0.03 K/uL  Auto Neutrophil % : 71.0 %  Auto Lymphocyte % : 19.4 %  Auto Monocyte % : 8.0 %  Auto Eosinophil % : 0.9 %  Auto Basophil % : 0.3 %    12    133<L>  |  101  |  16  ----------------------------<  92  4.6   |  22  |  0.73    Ca    9.0      05 Dec 2022 09:13  Phos  3.5     12-  Mg     1.80     12-    TPro  7.4  /  Alb  2.8<L>  /  TBili  0.6  /  DBili  x   /  AST  35<H>  /  ALT  40<H>  /  AlkPhos  170<H>  12-05    LIVER FUNCTIONS - ( 05 Dec 2022 09:13 )  Alb: 2.8 g/dL / Pro: 7.4 g/dL / ALK PHOS: 170 U/L / ALT: 40 U/L / AST: 35 U/L / GGT: x           Urinalysis Basic - ( 04 Dec 2022 17:20 )    Color: Yellow / Appearance: Clear / S.023 / pH: x  Gluc: x / Ketone: Negative  / Bili: Negative / Urobili: <2 mg/dL   Blood: x / Protein: 30 mg/dL / Nitrite: Negative   Leuk Esterase: Negative / RBC: 11-25 /HPF / WBC 0-2 /HPF   Sq Epi: x / Non Sq Epi: x / Bacteria: Negative        MICROBIOLOGY:      Rapid RVP Result: NotDetec ( @ 18:00)      RADIOLOGY  < from: CT Head No Cont (22 @ 21:41) >  No acute intracranial abnormalities.    Small vessel and atrophic changes.    < end of copied text >  < from: CT Angio Chest PE Protocol w/ IV Cont (22 @ 21:41) >  Scattered areas of subsegmental atelectasis within the lungs. No focal   alveolar infiltrate/consolidation.    < end of copied text >

## 2022-12-05 NOTE — CONSULT NOTE ADULT - SUBJECTIVE AND OBJECTIVE BOX
Komal Doran MD  Cardiology Fellow  [phone]  All Cardiology service information can be found 24/7 on [email or web address], password: [email or web address]    Patient seen and evaluated at bedside    Chief Complaint:    HPI:  89yoF hx hypothyroidism, AF, CHB s/p PPM, Parkinson disease, asthma, and recurrent UTI's p/w shaking. Per pt grandsons (HCP) pt has been having increase shaking for the past few months and has rigidity in her right and keeps it in a fist. it has gotten worse in the past 2 days and they were told to go to the ED. States that she shaan low grade fever of  at home. Denies any coughing with food. Per grandson, pt had similar symptoms in 2020 when they found she had a UTI.  No nausea or vomiting. Pt at baseline is full nonverbal and bedbound per son.     In the ED, pt was febrile to 100.7, , /99 and saturating well on RA.  (04 Dec 2022 23:56)      PMHx:   No pertinent past medical history    Asthma    Pacemaker    Hypertension        PSHx:   Femur fracture, right        Allergies:  No Known Allergies      Home Meds:    Current Medications:   albuterol/ipratropium for Nebulization 3 milliLiter(s) Nebulizer every 6 hours PRN  budesonide 160 MICROgram(s)/formoterol 4.5 MICROgram(s) Inhaler 2 Puff(s) Inhalation two times a day  enoxaparin Injectable 40 milliGRAM(s) SubCutaneous every 24 hours  hydrALAZINE Injectable 5 milliGRAM(s) IV Push every 8 hours  lactated ringers. 1000 milliLiter(s) IV Continuous <Continuous>  levothyroxine Injectable 20 MICROGram(s) IV Push <User Schedule>  levothyroxine Injectable 40 MICROGram(s) IV Push <User Schedule>  metoprolol tartrate Injectable 5 milliGRAM(s) IV Push every 6 hours  piperacillin/tazobactam IVPB.- 3.375 Gram(s) IV Intermittent once      FAMILY HISTORY:  Family history of asthma (Sibling)        Social History:  Unable to obtain due to AMS   REVIEW OF SYSTEMS:  Unable to obtain due to AMS     Physical Exam:  T(F): 99.1 (12-05), Max: 100.7 (12-04)  HR: 92 (12-05) (75 - 120)  BP: 179/99 (12-05) (138/116 - 197/98)  RR: 18 (12-05)  SpO2: 99% (12-05)  GENERAL: Shivering   HEAD:  Atraumatic, Normocephalic  ENT: Unable to assess   CHEST/LUNG: Rhonchi   BACK: No spinal tenderness  HEART: tachycardic with MAURI+   ABDOMEN: Soft, Nontender, Nondistended; Bowel sounds present  EXTREMITIES:  RUL edema, ulcer digit with open wound significantly tender   PSYCH: Nl behavior, nl affect  NEUROLOGY: AAOx3, non-focal, cranial nerves intact  SKIN: Normal color, No rashes or lesions  LINES:    Cardiovascular Diagnostic Testing:    ECG:  Paced rhythm     Echo:   2016  CONCLUSIONS:  1. Mitral annular calcification, otherwise normal mitral  valve. Moderate mitral regurgitation.  2. Calcified trileaflet aortic valve with decreased  opening. Peak transaortic valve gradient equals 17 mm Hg,  mean transaortic valve gradient equals 10 mm Hg, estimated  aortic valve area equals 1.9 sqcm (by continuity equation),  consistent with mild aortic stenosis.  3. Normal left ventricular internal dimensions and wall  thicknesses.  4. Normal left ventricular systolic function. No segmental  wall motion abnormalities.  5. Mild diastolic dysfunction (Stage I).  6. Normal right ventricular size andfunction.  7. Normal tricuspid valve. Mild-moderate tricuspid  regurgitation.  8. Estimated pulmonary artery systolic pressure equals 54  mm Hg, assuming right atrial pressure equals 10  mm Hg,  consistent with moderate pulmonary hypertension.  ------------------------------------------------------------------------  Confirmed on  3/9/2016 - 14:58:52 by Chandu Gonzalez MD, RPVI  ------------------------------------------------------------------------        CXR: Personally reviewed    Labs: Personally reviewed                        12.3   9.70  )-----------( 278      ( 05 Dec 2022 09:13 )             37.9     12-05    133<L>  |  101  |  16  ----------------------------<  92  4.6   |  22  |  0.73    Ca    9.0      05 Dec 2022 09:13  Phos  3.5     12-05  Mg     1.80     12-05    TPro  7.4  /  Alb  2.8<L>  /  TBili  0.6  /  DBili  x   /  AST  35<H>  /  ALT  40<H>  /  AlkPhos  170<H>  12-05    PT/INR - ( 04 Dec 2022 17:20 )   PT: 13.5 sec;   INR: 1.16 ratio         PTT - ( 04 Dec 2022 17:20 )  PTT:27.1 sec    CARDIAC MARKERS ( 05 Dec 2022 09:13 )  69 ng/L / x     / x     / 169 U/L / x     / 7.5 ng/mL  CARDIAC MARKERS ( 04 Dec 2022 23:20 )  75 ng/L / x     / x     / 204 U/L / x     / x      CARDIAC MARKERS ( 04 Dec 2022 20:12 )  70 ng/L / x     / x     / x     / x     / x      CARDIAC MARKERS ( 04 Dec 2022 17:20 )  63 ng/L / x     / x     / x     / x     / x            Serum Pro-Brain Natriuretic Peptide: 1851 pg/mL (12-04 @ 20:12)        Thyroid Stimulating Hormone, Serum: 3.16 uIU/mL (12-04 @ 17:20)     Komal Doran MD  Cardiology Fellow  [phone]  All Cardiology service information can be found 24/7 on [email or web address], password: [email or web address]    Patient seen and evaluated at bedside    Chief Complaint:    HPI:  89yoF hx hypothyroidism, AF, CHB s/p PPM, Parkinson disease, asthma, and recurrent UTI's p/w shaking. Per pt grandsons (HCP) pt has been having increase shaking for the past few months and has rigidity in her right and keeps it in a fist. it has gotten worse in the past 2 days and they were told to go to the ED. States that she shaan low grade fever of  at home. Denies any coughing with food. Per grandson, pt had similar symptoms in 2020 when they found she had a UTI.  No nausea or vomiting. Pt at baseline is full nonverbal and bedbound per son.     In the ED, pt was febrile to 100.7, , /99 and saturating well on RA.  (04 Dec 2022 23:56)      PMHx:   No pertinent past medical history  Asthma  Pacemaker  Hypertension      PSHx:   Femur fracture, right        Allergies:  No Known Allergies      Home Meds:    Current Medications:   albuterol/ipratropium for Nebulization 3 milliLiter(s) Nebulizer every 6 hours PRN  budesonide 160 MICROgram(s)/formoterol 4.5 MICROgram(s) Inhaler 2 Puff(s) Inhalation two times a day  enoxaparin Injectable 40 milliGRAM(s) SubCutaneous every 24 hours  hydrALAZINE Injectable 5 milliGRAM(s) IV Push every 8 hours  lactated ringers. 1000 milliLiter(s) IV Continuous <Continuous>  levothyroxine Injectable 20 MICROGram(s) IV Push <User Schedule>  levothyroxine Injectable 40 MICROGram(s) IV Push <User Schedule>  metoprolol tartrate Injectable 5 milliGRAM(s) IV Push every 6 hours  piperacillin/tazobactam IVPB.- 3.375 Gram(s) IV Intermittent once      FAMILY HISTORY:  Family history of asthma (Sibling)        Social History:  Unable to obtain due to AMS   REVIEW OF SYSTEMS:  Unable to obtain due to AMS     Physical Exam:  T(F): 99.1 (12-05), Max: 100.7 (12-04)  HR: 92 (12-05) (75 - 120)  BP: 179/99 (12-05) (138/116 - 197/98)  RR: 18 (12-05)  SpO2: 99% (12-05)  GENERAL: Shivering   HEAD:  Atraumatic, Normocephalic  ENT: Unable to assess   CHEST/LUNG: Rhonchi   BACK: No spinal tenderness  HEART: tachycardic with MAURI+   ABDOMEN: Soft, Nontender, Nondistended; Bowel sounds present  EXTREMITIES:  RUL edema, ulcer digit with open wound significantly tender   PSYCH: Nl behavior, nl affect  NEUROLOGY: AAOx3, non-focal, cranial nerves intact  SKIN: Normal color, No rashes or lesions  LINES:    Cardiovascular Diagnostic Testing:    ECG:  Paced rhythm     Echo:   2016  CONCLUSIONS:  1. Mitral annular calcification, otherwise normal mitral  valve. Moderate mitral regurgitation.  2. Calcified trileaflet aortic valve with decreased  opening. Peak transaortic valve gradient equals 17 mm Hg,  mean transaortic valve gradient equals 10 mm Hg, estimated  aortic valve area equals 1.9 sqcm (by continuity equation),  consistent with mild aortic stenosis.  3. Normal left ventricular internal dimensions and wall  thicknesses.  4. Normal left ventricular systolic function. No segmental  wall motion abnormalities.  5. Mild diastolic dysfunction (Stage I).  6. Normal right ventricular size andfunction.  7. Normal tricuspid valve. Mild-moderate tricuspid  regurgitation.  8. Estimated pulmonary artery systolic pressure equals 54  mm Hg, assuming right atrial pressure equals 10  mm Hg,  consistent with moderate pulmonary hypertension.  ------------------------------------------------------------------------  Confirmed on  3/9/2016 - 14:58:52 by Chandu Gonzalez MD, RPVI  ------------------------------------------------------------------------        CXR: Personally reviewed  Labs: Personally reviewed             12.3   9.70  )-----------( 278      ( 05 Dec 2022 09:13 )             37.9     12-05  133<L>  |  101  |  16  ----------------------------<  92  4.6   |  22  |  0.73    Ca    9.0      05 Dec 2022 09:13  Phos  3.5     12-05  Mg     1.80     12-05    TPro  7.4  /  Alb  2.8<L>  /  TBili  0.6  /  DBili  x   /  AST  35<H>  /  ALT  40<H>  /  AlkPhos  170<H>  12-05  PT/INR - ( 04 Dec 2022 17:20 )   PT: 13.5 sec;   INR: 1.16 ratio    PTT - ( 04 Dec 2022 17:20 )  PTT:27.1 sec    CARDIAC MARKERS ( 05 Dec 2022 09:13 )  69 ng/L / x     / x     / 169 U/L / x     / 7.5 ng/mL  CARDIAC MARKERS ( 04 Dec 2022 23:20 )  75 ng/L / x     / x     / 204 U/L / x     / x      CARDIAC MARKERS ( 04 Dec 2022 20:12 )  70 ng/L / x     / x     / x     / x     / x      CARDIAC MARKERS ( 04 Dec 2022 17:20 )  63 ng/L / x     / x     / x     / x     / x        Serum Pro-Brain Natriuretic Peptide: 151 pg/mL (12-04 @ 20:12)  Thyroid Stimulating Hormone, Serum: 3.16 uIU/mL (12-04 @ 17:20)

## 2022-12-05 NOTE — CONSULT NOTE ADULT - ASSESSMENT
89F with history of AF, CHB s/p PPM in 2016, parkinson's disease, asthma presenting with fevers chills, and worsening of baseline mental status. History obtained from daughter through .  89F with history of AF, CHB s/p PPM in 2016, parkinson's disease, asthma presenting with fevers chills, and worsening of baseline mental status. History obtained from daughter through . She reports over the past 6 months her mother has become mostly nonverbal and unable to communicate completely depending on them for iADLs/ADLs. During this time she had tightening/constriction of her right arm. She noted that she had digit ulceration over the past few days and acutely while in ED she has had acute edema of her RUL. Otherwise she was febrile at home and shaking. Vitals significant for fevers, tachypnea, tachycardia which is now improved. Blood pressures of 179-190s/99. Troponin elevation but stable with no ekg changes.     #Troponin elevation  #HTN urgency  #SIRS   possibly in the setting of elevated blood pressures, sepsis aspiration pneumonitis vs. finger infection). Not likely ACS would not pursue further ischemic work up at this time. Would recommend evaluation of right hand as it is exquisitely tender and has worsening edema, increasing blood pressure control, and GOC discussion with family    Cardiology to sign off please call us with any questions

## 2022-12-05 NOTE — OCCUPATIONAL THERAPY INITIAL EVALUATION ADULT - MD ORDER
Occupational Therapy (OT) to evaluate and treat. Occupational Therapy (OT) to evaluate and treat. Per JASMINA Jhaveri, pt is okay to participate in OT evaluation and perform activity as tolerated.

## 2022-12-05 NOTE — CONSULT NOTE ADULT - ASSESSMENT
89yoF hx hypothyroidism, AF, CHB s/p PPM, Parkinson disease, asthma, and recurrent UTI's p/w shaking, fever    Julio Pritchard  Attending Physician   Division of Infectious Disease  Office #[phone]  Available on Microsoft Teams also  After 5pm/weekend or no response, call #[phone]

## 2022-12-05 NOTE — ED ADULT NURSE REASSESSMENT NOTE - NS ED NURSE REASSESS COMMENT FT1
pt at baseline mental status. respirations even an unlabored, O2 100% RA. report given to [facility] JASMINA [facility]i. [facility]

## 2022-12-05 NOTE — OCCUPATIONAL THERAPY INITIAL EVALUATION ADULT - ADDITIONAL COMMENTS
Prior to hospitalization, pt.'s daughter bedside reports pt. dependent with all ADL's (upper & lower body dressing, sponge-bathing, toileting with diapers, and feeding). Pt.'s daughter states feeding pt. on liquid diet via syringe for at least the last 6 months. Per pt.'s daughter, pt mostly bedbound prior to hospitalization and 2 person bed<>wheelchair. Pt.'s daughter explains that she and her son-in-law share CDPAP hours for pt. 10 hours/day x 7 days/week; however, each of them are usually with pt. about 15-16 hours/day.

## 2022-12-05 NOTE — PHARMACOTHERAPY INTERVENTION NOTE - COMMENTS
Medication history is complete. Medication list updated in Outpatient Medication Record (OMR), spoke with family with list and verified with outpatient pharmacy.   Of Note:   - Per family, patient was also on Amlodipine 5mg however no longer taking, removed from OMR.

## 2022-12-05 NOTE — CHART NOTE - NSCHARTNOTEFT_GEN_A_CORE
MEDICINE PA NOTE     Called by RN for patient with hypertension to SBP 190s.   Lopressor 2.5mg IVP as ordered given with no improvement.   Hydral 5mg IVP x 1 dose given with improvement to SBP 170s.   Lopressor increased ot 5mg IVP Q6H for now while NPO and Hydral 5mg IVP PRN for SBP > 180 ordered.   Monitor for now    BAILEY AvilaC  Department of Medicine/ [facility]  In house Beeper #[id number]  Reachable via teams

## 2022-12-05 NOTE — GOALS OF CARE CONVERSATION - ADVANCED CARE PLANNING - CONVERSATION DETAILS
89 YO Female with PMHx of Hypothyroidism, HTN, AFIB, CHB s/p PPM, Parkinson Disease, Asthma, and recurrent UTI's (last admission for [facility] UTI in 10/2020) presented with fever, tachycardia and leukocytosis with concern for sepsis likely from recurrent UTI vs aspiration pneumonitis. Family also noted with worsening if Parkinsonian symptoms with resting tremors and rigidity, and overall AOx1 at baseline, bedbound and minimally verbal. Patient seen in past by SS in 9/2020 and at the time oral feeds was contrindicated however family wished for pleasure feeds and full code.     Patient now returned with infectious concern (UTI vs PNA) and worsening/ progression of Parkinsonian Disease. Patient has NOT followed with Neurology in over 2 years and family notes she is more bed bound and minimally verbal. Case discussed with Grand-Son/ HCP, Troy ([phone]) by bedside via phona and explained current medical conditions, prognosis, concern for worsening disease, and overall poor prognosis at [facility]. Tory expressed comprehension and wished to discuss case with his Family prior to making further discussion on [facility]. Explained Palliative consult and in acceptance to discuss case further.

## 2022-12-05 NOTE — OCCUPATIONAL THERAPY INITIAL EVALUATION ADULT - LIVES WITH, PROFILE
Pt.'s daughter bedside reports pt. lives with her son and daughter-in-law in a house with steps to enter, +bedroom and bathroom are located on the main level.

## 2022-12-05 NOTE — CONSULT NOTE ADULT - SUBJECTIVE AND OBJECTIVE BOX
[facility]        Patient is a 89y old  Female who presents with a chief complaint of b/l shaking (05 Dec 2022 12:14)    Excerpt from H&P," 89yoF hx hypothyroidism, AF, CHB s/p PPM, Parkinson disease, asthma, and recurrent UTI's p/w shaking. Per pt grandsons (HCP) pt has been having increase shaking for the past few months and has rigidity in her right and keeps it in a fist. it has gotten worse in the past 2 days and they were told to go to the ED. States that she shaan low grade fever of  at home. Denies any coughing with food. Per grandson, pt had similar symptoms in  when they found she had a UTI.  No nausea or vomiting. Pt at baseline is full nonverbal and bedbound per son.          *****PAST MEDICAL / Surgical  HISTORY:  PAST MEDICAL & SURGICAL HISTORY:  Asthma      Pacemaker  2/2 3rd degree heart block, placed in 3/2016, [facility] [place] PPM      Hypertension      Femur fracture, right               *****FAMILY HISTORY:  FAMILY HISTORY:  Family history of asthma (Sibling)             *****SOCIAL HISTORY:  Alcohol: None  Smoking: None         *****ALLERGIES:   Allergies    No Known Allergies    Intolerances             *****MEDICATIONS: current medication reviewed and documented.   MEDICATIONS  (STANDING):  budesonide 160 MICROgram(s)/formoterol 4.5 MICROgram(s) Inhaler 2 Puff(s) Inhalation two times a day  enoxaparin Injectable 40 milliGRAM(s) SubCutaneous every 24 hours  lactated ringers. 1000 milliLiter(s) (75 mL/Hr) IV Continuous <Continuous>  levothyroxine Injectable 20 MICROGram(s) IV Push <User Schedule>  levothyroxine Injectable 40 MICROGram(s) IV Push <User Schedule>  metoprolol tartrate Injectable 5 milliGRAM(s) IV Push every 6 hours    MEDICATIONS  (PRN):  albuterol/ipratropium for Nebulization 3 milliLiter(s) Nebulizer every 6 hours PRN Shortness of Breath and/or Wheezing  hydrALAZINE Injectable 5 milliGRAM(s) IV Push every 8 hours PRN for SBP > 180           *****REVIEW OF SYSTEM:  GEN: no fever, no chills, no pain  RESP: no SOB, no cough, no sputum  CVS: no chest pain, no palpitations, no edema  GI: no abdominal pain, no nausea, no vomiting, no constipation, no diarrhea  : no dysurea, no frequency, no hematurea  Neuro: no headache, no dizziness  PSYCH: no anxiety, no depression  Derm : no itching, no rash         *****VITAL SIGNS:  T(C): 36.7 (22 @ 09:48), Max: 38.2 (22 @ 17:17)  HR: 90 (22 @ 13:11) (75 - 120)  BP: 181/92 (22 @ 13:11) (138/116 - 197/98)  RR: 18 (22 @ 11:05) (16 - 30)  SpO2: 99% (22 @ 11:05) (84% - 100%)  Wt(kg): --           *****PHYSICAL EXAM:   Alert oriented x 2    Attention comprehension are fair. Able to name, repeat without any difficulty.   Able to follow 1  step commands.     EOMI fundi not visualized,  VFF to confrontration  No facial asymmetry   Tongue is midline     Moving all 4 ext symmetrically     Reflexes are symmetric throughout   sensation is grossly symmetric  Gait : not assessed.  B/L down going toes               *****LAB AND IMAGIN.3   9.70  )-----------( 278      ( 05 Dec 2022 09:13 )             37.9               12-    133<L>  |  101  |  16  ----------------------------<  92  4.6   |  22  |  0.73    Ca    9.0      05 Dec 2022 09:13  Phos  3.5     12-  Mg     1.80     12-05    TPro  7.4  /  Alb  2.8<L>  /  TBili  0.6  /  DBili  x   /  AST  35<H>  /  ALT  40<H>  /  AlkPhos  170<H>  12-05    PT/INR - ( 04 Dec 2022 17:20 )   PT: 13.5 sec;   INR: 1.16 ratio         PTT - ( 04 Dec 2022 17:20 )  PTT:27.1 sec            CARDIAC MARKERS ( 05 Dec 2022 09:13 )  x     / x     / 169 U/L / x     / 7.5 ng/mL  CARDIAC MARKERS ( 04 Dec 2022 23:20 )  x     / x     / 204 U/L / x     / x                  Urinalysis Basic - ( 04 Dec 2022 17:20 )    Color: Yellow / Appearance: Clear / S.023 / pH: x  Gluc: x / Ketone: Negative  / Bili: Negative / Urobili: <2 mg/dL   Blood: x / Protein: 30 mg/dL / Nitrite: Negative   Leuk Esterase: Negative / RBC: 11-25 /HPF / WBC 0-2 /HPF   Sq Epi: x / Non Sq Epi: x / Bacteria: Negative        [All pertinent recent Imaging reports reviewed]         *****A S S E S S M E N T   A N D   P L A N :     Excerpt from H&P,"     89yoF hx hypothyroidism, AF, CHB s/p PPM, Parkinson disease, asthma, and recurrent UTI's p/w shaking. Per pt grandsons (HCP) pt has been having increase shaking for the past few months and has rigidity in her right and keeps it in a fist. it has gotten worse in the past 2 days and they were told to go to the ED. States that she shaan low grade fever of  at home. Denies any coughing with food. Per grandson, pt had similar symptoms in  when they found she had a UTI.  No nausea or vomiting. Pt at baseline is full nonverbal and bedbound per son.     Problem/Recommendations 1: Right  hand contracture with purulence    likely with a superinfection ? infectious disease on board       Problem/Recommendations 2:    shaking not witness   possible rigors however with underlying parkinons       Problem/Recommendations 3: ams likely multifactorial metabolic encephalopathy worsening underlying dementia  with poor functioning status     hyponatremia   continue to monitor      pt at risk for developing delirium, therefore please institute the following preventative measures if possible          - initiating early mobilization          -minimizing "tethers" - IV, oxygen, catheters, etc          -avoiding   sedatives          -maintaining hydration/nutrition          -avoid anticholinergics - diphenhydramine, etc          -pain control          -sleep wake cycle regulation; avoid day time somnolence           -supportive environment    ___________________________  Will follow with you.  Thank you,  Nikia Marcus MD  Diplomate of the American Board of Neurology and Psychiatry.  Diplomate of the American Board of Vascular Neurology.   [facility] ([facility]   Ph: [phone]    Differential diagnosis and plan of care discussed with patient after the evaluation.   Advanced care planning options discussed.   Pain assessed and judicious use of narcotics when appropriate was discussed.  Importance of Fall prevention discussed.  Counseling on Smoking and Alcohol cessation was offered when appropriate.  Counseling on Diet, exercise, and medication compliance was done.   83 minutes spent on the total encounter;  more than 50 % of the visit was spent on counseling  and or coordinating care by the attending physician.    Thank you for allowing me to participate in the care of this [facility] patient. Please do not hesitate to call me if you have any questions.     This and subsequent notes  will  inherently be subject to errors including those of syntax and substitutions which may escape proofreading. In such instances original meaning may be extrapolated by contextual derivation.

## 2022-12-06 DIAGNOSIS — R53.2 FUNCTIONAL QUADRIPLEGIA: ICD-10-CM

## 2022-12-06 DIAGNOSIS — Z71.89 OTHER SPECIFIED COUNSELING: ICD-10-CM

## 2022-12-06 DIAGNOSIS — R62.7 ADULT FAILURE TO THRIVE: ICD-10-CM

## 2022-12-06 DIAGNOSIS — G93.49 OTHER ENCEPHALOPATHY: ICD-10-CM

## 2022-12-06 DIAGNOSIS — Z51.5 ENCOUNTER FOR PALLIATIVE CARE: ICD-10-CM

## 2022-12-06 LAB
ALBUMIN SERPL ELPH-MCNC: 2.7 G/DL — LOW (ref 3.3–5)
ALP SERPL-CCNC: 153 U/L — HIGH (ref 40–120)
ALT FLD-CCNC: 36 U/L — HIGH (ref 4–33)
ANION GAP SERPL CALC-SCNC: 11 MMOL/L — SIGNIFICANT CHANGE UP (ref 7–14)
AST SERPL-CCNC: 31 U/L — SIGNIFICANT CHANGE UP (ref 4–32)
BASOPHILS # BLD AUTO: 0.02 K/UL — SIGNIFICANT CHANGE UP (ref 0–0.2)
BASOPHILS NFR BLD AUTO: 0.3 % — SIGNIFICANT CHANGE UP (ref 0–2)
BILIRUB SERPL-MCNC: 0.8 MG/DL — SIGNIFICANT CHANGE UP (ref 0.2–1.2)
BUN SERPL-MCNC: 15 MG/DL — SIGNIFICANT CHANGE UP (ref 7–23)
CALCIUM SERPL-MCNC: 8.8 MG/DL — SIGNIFICANT CHANGE UP (ref 8.4–10.5)
CHLORIDE SERPL-SCNC: 99 MMOL/L — SIGNIFICANT CHANGE UP (ref 98–107)
CO2 SERPL-SCNC: 21 MMOL/L — LOW (ref 22–31)
CREAT SERPL-MCNC: 0.81 MG/DL — SIGNIFICANT CHANGE UP (ref 0.5–1.3)
EGFR: 68 ML/MIN/1.73M2 — SIGNIFICANT CHANGE UP
EOSINOPHIL # BLD AUTO: 0.03 K/UL — SIGNIFICANT CHANGE UP (ref 0–0.5)
EOSINOPHIL NFR BLD AUTO: 0.4 % — SIGNIFICANT CHANGE UP (ref 0–6)
GLUCOSE BLDC GLUCOMTR-MCNC: 98 MG/DL — SIGNIFICANT CHANGE UP (ref 70–99)
GLUCOSE SERPL-MCNC: 87 MG/DL — SIGNIFICANT CHANGE UP (ref 70–99)
HAV IGM SER-ACNC: SIGNIFICANT CHANGE UP
HBV CORE IGM SER-ACNC: SIGNIFICANT CHANGE UP
HBV SURFACE AG SER-ACNC: SIGNIFICANT CHANGE UP
HCT VFR BLD CALC: 35.9 % — SIGNIFICANT CHANGE UP (ref 34.5–45)
HCV AB S/CO SERPL IA: 0.22 S/CO — SIGNIFICANT CHANGE UP (ref 0–0.99)
HCV AB SERPL-IMP: SIGNIFICANT CHANGE UP
HGB BLD-MCNC: 12.1 G/DL — SIGNIFICANT CHANGE UP (ref 11.5–15.5)
IANC: 5.84 K/UL — SIGNIFICANT CHANGE UP (ref 1.8–7.4)
IMM GRANULOCYTES NFR BLD AUTO: 0.4 % — SIGNIFICANT CHANGE UP (ref 0–0.9)
LYMPHOCYTES # BLD AUTO: 1.41 K/UL — SIGNIFICANT CHANGE UP (ref 1–3.3)
LYMPHOCYTES # BLD AUTO: 17.7 % — SIGNIFICANT CHANGE UP (ref 13–44)
MAGNESIUM SERPL-MCNC: 2.1 MG/DL — SIGNIFICANT CHANGE UP (ref 1.6–2.6)
MCHC RBC-ENTMCNC: 31.5 PG — SIGNIFICANT CHANGE UP (ref 27–34)
MCHC RBC-ENTMCNC: 33.7 GM/DL — SIGNIFICANT CHANGE UP (ref 32–36)
MCV RBC AUTO: 93.5 FL — SIGNIFICANT CHANGE UP (ref 80–100)
MONOCYTES # BLD AUTO: 0.64 K/UL — SIGNIFICANT CHANGE UP (ref 0–0.9)
MONOCYTES NFR BLD AUTO: 8 % — SIGNIFICANT CHANGE UP (ref 2–14)
NEUTROPHILS # BLD AUTO: 5.84 K/UL — SIGNIFICANT CHANGE UP (ref 1.8–7.4)
NEUTROPHILS NFR BLD AUTO: 73.2 % — SIGNIFICANT CHANGE UP (ref 43–77)
NRBC # BLD: 0 /100 WBCS — SIGNIFICANT CHANGE UP (ref 0–0)
NRBC # FLD: 0 K/UL — SIGNIFICANT CHANGE UP (ref 0–0)
PHOSPHATE SERPL-MCNC: 3.4 MG/DL — SIGNIFICANT CHANGE UP (ref 2.5–4.5)
PLATELET # BLD AUTO: 266 K/UL — SIGNIFICANT CHANGE UP (ref 150–400)
POTASSIUM SERPL-MCNC: 3.7 MMOL/L — SIGNIFICANT CHANGE UP (ref 3.5–5.3)
POTASSIUM SERPL-SCNC: 3.7 MMOL/L — SIGNIFICANT CHANGE UP (ref 3.5–5.3)
PROT SERPL-MCNC: 7.1 G/DL — SIGNIFICANT CHANGE UP (ref 6–8.3)
RBC # BLD: 3.84 M/UL — SIGNIFICANT CHANGE UP (ref 3.8–5.2)
RBC # FLD: 13.3 % — SIGNIFICANT CHANGE UP (ref 10.3–14.5)
SODIUM SERPL-SCNC: 131 MMOL/L — LOW (ref 135–145)
WBC # BLD: 7.97 K/UL — SIGNIFICANT CHANGE UP (ref 3.8–10.5)
WBC # FLD AUTO: 7.97 K/UL — SIGNIFICANT CHANGE UP (ref 3.8–10.5)

## 2022-12-06 PROCEDURE — 99232 SBSQ HOSP IP/OBS MODERATE 35: CPT

## 2022-12-06 PROCEDURE — 99497 ADVNCD CARE PLAN 30 MIN: CPT | Mod: 25

## 2022-12-06 PROCEDURE — 99223 1ST HOSP IP/OBS HIGH 75: CPT

## 2022-12-06 PROCEDURE — 93971 EXTREMITY STUDY: CPT | Mod: 26

## 2022-12-06 RX ORDER — ENOXAPARIN SODIUM 100 MG/ML
30 INJECTION SUBCUTANEOUS EVERY 24 HOURS
Refills: 0 | Status: DISCONTINUED | OUTPATIENT
Start: 2022-12-06 | End: 2022-12-09

## 2022-12-06 RX ADMIN — Medication 5 MILLIGRAM(S): at 16:20

## 2022-12-06 RX ADMIN — BUDESONIDE AND FORMOTEROL FUMARATE DIHYDRATE 2 PUFF(S): 160; 4.5 AEROSOL RESPIRATORY (INHALATION) at 01:23

## 2022-12-06 RX ADMIN — Medication 5 MILLIGRAM(S): at 01:19

## 2022-12-06 RX ADMIN — PIPERACILLIN AND TAZOBACTAM 25 GRAM(S): 4; .5 INJECTION, POWDER, LYOPHILIZED, FOR SOLUTION INTRAVENOUS at 05:37

## 2022-12-06 RX ADMIN — SODIUM CHLORIDE 75 MILLILITER(S): 9 INJECTION, SOLUTION INTRAVENOUS at 18:17

## 2022-12-06 RX ADMIN — BUDESONIDE AND FORMOTEROL FUMARATE DIHYDRATE 2 PUFF(S): 160; 4.5 AEROSOL RESPIRATORY (INHALATION) at 22:04

## 2022-12-06 RX ADMIN — Medication 5 MILLIGRAM(S): at 12:48

## 2022-12-06 RX ADMIN — Medication 20 MICROGRAM(S): at 07:07

## 2022-12-06 RX ADMIN — PIPERACILLIN AND TAZOBACTAM 25 GRAM(S): 4; .5 INJECTION, POWDER, LYOPHILIZED, FOR SOLUTION INTRAVENOUS at 16:19

## 2022-12-06 RX ADMIN — ENOXAPARIN SODIUM 30 MILLIGRAM(S): 100 INJECTION SUBCUTANEOUS at 12:49

## 2022-12-06 RX ADMIN — Medication 5 MILLIGRAM(S): at 18:28

## 2022-12-06 RX ADMIN — Medication 5 MILLIGRAM(S): at 05:36

## 2022-12-06 RX ADMIN — PIPERACILLIN AND TAZOBACTAM 25 GRAM(S): 4; .5 INJECTION, POWDER, LYOPHILIZED, FOR SOLUTION INTRAVENOUS at 22:10

## 2022-12-06 RX ADMIN — Medication 5 MILLIGRAM(S): at 22:04

## 2022-12-06 NOTE — CONSULT NOTE ADULT - PROBLEM SELECTOR RECOMMENDATION 6
Patient is FULL CODE  Please see separate GOC note.  >16 minutes spent on advanced care planning with family.

## 2022-12-06 NOTE — CONSULT NOTE ADULT - PROBLEM SELECTOR RECOMMENDATION 4
- CTA Chest - Scattered areas of subsegmental atelectasis within the lungs. No focal   alveolar infiltrate/consolidation.  - Infectious disease recommendations appreciated   - management as per primary team

## 2022-12-06 NOTE — PROGRESS NOTE ADULT - SUBJECTIVE AND OBJECTIVE BOX
[facility]      Seen earlier today Date of service   No new neurological symptoms reported. Remains stable neurologically.   - Today, patient is without complaints.          *****MEDICATIONS: Current medication reviewed and documented.    MEDICATIONS  (STANDING):  budesonide 160 MICROgram(s)/formoterol 4.5 MICROgram(s) Inhaler 2 Puff(s) Inhalation two times a day  enoxaparin Injectable 30 milliGRAM(s) SubCutaneous every 24 hours  hydrALAZINE Injectable 5 milliGRAM(s) IV Push every 8 hours  lactated ringers. 1000 milliLiter(s) (75 mL/Hr) IV Continuous <Continuous>  levothyroxine Injectable 20 MICROGram(s) IV Push <User Schedule>  levothyroxine Injectable 40 MICROGram(s) IV Push <User Schedule>  metoprolol tartrate Injectable 5 milliGRAM(s) IV Push every 6 hours  piperacillin/tazobactam IVPB.. 3.375 Gram(s) IV Intermittent every 8 hours    MEDICATIONS  (PRN):  albuterol/ipratropium for Nebulization 3 milliLiter(s) Nebulizer every 6 hours PRN Shortness of Breath and/or Wheezing          ***** VITAL SIGNS:   Vital Signs Last 24 Hrs       T(F): 98.5 (22 @ 05:34), Max: 100.4 (22 @ 14:20)  HR: 85 (22 @ 05:34) (77 - 90)  BP: 133/84 (22 @ 05:34) (128/65 - 150/69)  RR: 20 (22 @ 05:34) (20 - 20)  SpO2: 100% (22 @ 05:34) (99% - 100%)  Wt(kg): --  I&O's Summary    05 Dec 2022 07:01  -  06 Dec 2022 07:00  --------------------------------------------------------  IN: 0 mL / OUT: 900 mL / NET: -900 mL             *****PHYSICAL EXAM:   alert oriented x 2 attention comprehension are fair.  Able to name, repeat.   EOmi fundi not visualized   no nystagmus VFF to confrontation  Tongue is midline  Moving all 4 ext spontaneously no drift appreciated    Gait not assessed.            *****LAB AND IMAGIN.1   7.97  )-----------( 266      ( 06 Dec 2022 06:00 )             35.9               12-    131<L>  |  99  |  15  ----------------------------<  87  3.7   |  21<L>  |  0.81    Ca    8.8      06 Dec 2022 06:00  Phos  3.4     12-  Mg     2.10     12-    TPro  7.1  /  Alb  2.7<L>  /  TBili  0.8  /  DBili  x   /  AST  31  /  ALT  36<H>  /  AlkPhos  153<H>  12-06    PT/INR - ( 04 Dec 2022 17:20 )   PT: 13.5 sec;   INR: 1.16 ratio         PTT - ( 04 Dec 2022 17:20 )  PTT:27.1 sec       CARDIAC MARKERS ( 05 Dec 2022 09:13 )  x     / x     / 169 U/L / x     / 7.5 ng/mL  CARDIAC MARKERS ( 04 Dec 2022 23:20 )  x     / x     / 204 U/L / x     / x                  Urinalysis Basic - ( 04 Dec 2022 17:20 )    Color: Yellow / Appearance: Clear / S.023 / pH: x  Gluc: x / Ketone: Negative  / Bili: Negative / Urobili: <2 mg/dL   Blood: x / Protein: 30 mg/dL / Nitrite: Negative   Leuk Esterase: Negative / RBC: 11-25 /HPF / WBC 0-2 /HPF   Sq Epi: x / Non Sq Epi: x / Bacteria: Negative      [All pertinent recent Imaging/Reports reviewed]            *****A S S E S S M E N T   A N D   P L A N :    Excerpt from H&P,"     89yoF hx hypothyroidism, AF, CHB s/p PPM, Parkinson disease, asthma, and recurrent UTI's p/w shaking. Per pt grandsons (HCP) pt has been having increase shaking for the past few months and has rigidity in her right and keeps it in a fist. it has gotten worse in the past 2 days and they were told to go to the ED. States that she shaan low grade fever of  at home. Denies any coughing with food. Per grandson, pt had similar symptoms in  when they found she had a UTI.  No nausea or vomiting. Pt at baseline is full nonverbal and bedbound per son.     Problem/Recommendations 1: Right  hand contracture with purulence    likely with a superinfection ? infectious disease on board       Problem/Recommendations 2:    shaking not witness   possible rigors however with underlying parkinons       Problem/Recommendations 3: ams likely multifactorial metabolic encephalopathy worsening underlying dementia  with poor functioning status     hyponatremia   continue to monitor   Thank you for allowing me to participate in the care of this patient. Will continue to follow patient periodically. Please do not hesitate to call me if you have any  questions or if there has been a change in patients neurological status     ________________  Nikia Marcus MD  [facility] ([facility]  [phone]      33 minutes spent on total encounter; more than 50 % of the visit was  spent counseling about plan of care, compliance to diet/exercise and medication regimen and or  coordinating care by the attending physician.      It is advised that stroke patients follow up with BHAARTI Araujo @ [phone] in 1- 2 weeks.   Others please follow up with Dr. Michael Nissenbaum [phone] [facility]      Seen earlier today Date of service   No new neurological symptoms reported. Remains stable neurologically.   - Today, patient is without complaints.          *****MEDICATIONS: Current medication reviewed and documented.    MEDICATIONS  (STANDING):  budesonide 160 MICROgram(s)/formoterol 4.5 MICROgram(s) Inhaler 2 Puff(s) Inhalation two times a day  enoxaparin Injectable 30 milliGRAM(s) SubCutaneous every 24 hours  hydrALAZINE Injectable 5 milliGRAM(s) IV Push every 8 hours  lactated ringers. 1000 milliLiter(s) (75 mL/Hr) IV Continuous <Continuous>  levothyroxine Injectable 20 MICROGram(s) IV Push <User Schedule>  levothyroxine Injectable 40 MICROGram(s) IV Push <User Schedule>  metoprolol tartrate Injectable 5 milliGRAM(s) IV Push every 6 hours  piperacillin/tazobactam IVPB.. 3.375 Gram(s) IV Intermittent every 8 hours    MEDICATIONS  (PRN):  albuterol/ipratropium for Nebulization 3 milliLiter(s) Nebulizer every 6 hours PRN Shortness of Breath and/or Wheezing          ***** VITAL SIGNS:   Vital Signs Last 24 Hrs       T(F): 98.5 (22 @ 05:34), Max: 100.4 (22 @ 14:20)  HR: 85 (22 @ 05:34) (77 - 90)  BP: 133/84 (22 @ 05:34) (128/65 - 150/69)  RR: 20 (22 @ 05:34) (20 - 20)  SpO2: 100% (22 @ 05:34) (99% - 100%)  Wt(kg): --  I&O's Summary    05 Dec 2022 07:01  -  06 Dec 2022 07:00  --------------------------------------------------------  IN: 0 mL / OUT: 900 mL / NET: -900 mL             *****PHYSICAL EXAM:   alert after much coercion   does not follow any commands   eyes open   non verbal   falls asleep easily            *****LAB AND IMAGIN.1   7.97  )-----------( 266      ( 06 Dec 2022 06:00 )             35.9               12-    131<L>  |  99  |  15  ----------------------------<  87  3.7   |  21<L>  |  0.81    Ca    8.8      06 Dec 2022 06:00  Phos  3.4     12  Mg     2.10     12    TPro  7.1  /  Alb  2.7<L>  /  TBili  0.8  /  DBili  x   /  AST  31  /  ALT  36<H>  /  AlkPhos  153<H>  12    PT/INR - ( 04 Dec 2022 17:20 )   PT: 13.5 sec;   INR: 1.16 ratio         PTT - ( 04 Dec 2022 17:20 )  PTT:27.1 sec       CARDIAC MARKERS ( 05 Dec 2022 09:13 )  x     / x     / 169 U/L / x     / 7.5 ng/mL  CARDIAC MARKERS ( 04 Dec 2022 23:20 )  x     / x     / 204 U/L / x     / x                  Urinalysis Basic - ( 04 Dec 2022 17:20 )    Color: Yellow / Appearance: Clear / S.023 / pH: x  Gluc: x / Ketone: Negative  / Bili: Negative / Urobili: <2 mg/dL   Blood: x / Protein: 30 mg/dL / Nitrite: Negative   Leuk Esterase: Negative / RBC: 11-25 /HPF / WBC 0-2 /HPF   Sq Epi: x / Non Sq Epi: x / Bacteria: Negative      [All pertinent recent Imaging/Reports reviewed]            *****A S S E S S M E N T   A N D   P L A N :    Excerpt from H&P,"     89yoF hx hypothyroidism, AF, CHB s/p PPM, Parkinson disease, asthma, and recurrent UTI's p/w shaking. Per pt grandsons (HCP) pt has been having increase shaking for the past few months and has rigidity in her right and keeps it in a fist. it has gotten worse in the past 2 days and they were told to go to the ED. States that she hsaan low grade fever of  at home. Denies any coughing with food. Per grandson, pt had similar symptoms in  when they found she had a UTI.  No nausea or vomiting. Pt at baseline is full nonverbal and bedbound per son.     Problem/Recommendations 1: Right  hand contracture with purulence    likely with a superinfection ? infectious disease on board       Problem/Recommendations 2:    shaking not witness   possible rigors however with underlying parkinons       Problem/Recommendations 3: ams likely multifactorial metabolic encephalopathy worsening underlying dementia  with poor functioning status     hyponatremia   continue to monitor   Thank you for allowing me to participate in the care of this patient. Will continue to follow patient periodically. Please do not hesitate to call me if you have any  questions or if there has been a change in patients neurological status     ________________  Nikia aMrcus MD  [facility]  [phone]      33 minutes spent on total encounter; more than 50 % of the visit was  spent counseling about plan of care, compliance to diet/exercise and medication regimen and or  coordinating care by the attending physician.      It is advised that stroke patients follow up with BHARATI Araujo @ [phone] in 1- 2 weeks.   Others please follow up with Dr. Michael Nissenbaum [phone]

## 2022-12-06 NOTE — GOALS OF CARE CONVERSATION - ADVANCED CARE PLANNING - CONVERSATION DETAILS
Palliative Care consulted for complex decision making  related to goals of care discussions in the setting of advanced illness    Patient lives at home with daughter; son also lives nearby and helps with patient's care. Patient bedbound and needs assistance with ADLs. Patient minimally verbal now at baseline. Also with decreased PO intake; daughter states family has been modifying diet consistency at home.    Reviewed Parkinson's disease trajectory and prognosis. Palliative Care consulted for complex decision making  related to goals of care discussions in the setting of advanced illness    Patient lives at home with daughter; son also lives nearby and helps with patient's care. Patient bedbound and needs assistance with ADLs. Patient minimally verbal now at baseline. Also with decreased PO intake; daughter states family has been modifying diet consistency at home.    Reviewed Parkinson's disease trajectory and prognosis including that it is an irreversible neurological condition. Reviewed dysphagia as a complication with prog Palliative Care consulted for complex decision making  related to goals of care discussions in the setting of advanced illness    Patient lives at home with daughter; son also lives nearby and helps with patient's care. Patient bedbound and needs assistance with ADLs. Patient minimally verbal now at baseline.    Reviewed Parkinson's disease trajectory and prognosis including that it is an irreversible and progressive condition. Reviewed dysphagia as a complication with progression of Parkinsons. Daughter states patient with decreased PO intake; daughter states family has been modifying diet consistency at home. D Palliative Care consulted for complex decision making  related to goals of care discussions in the setting of advanced illness    Patient lives at home with daughter; son also lives nearby and helps with patient's care. Patient bedbound and needs assistance with ADLs. Patient minimally verbal now at baseline.    Reviewed Parkinson's disease trajectory and prognosis including that it is an irreversible and progressive condition. Reviewed dysphagia as a complication with progression of Parkinsons. Daughter states patient with decreased PO intake; daughter states family has been modifying diet consistency at home. Discussed that recommendation for dysphagia in setting of advanced dementia is pleasure feeds understanding risk of aspiration as feeding tubes do not decrease risk of aspiration.   Advanced care planning discussed. Discussed options of allowing for natural death vs CPR/ intubation in event of a cardiopulmonary arrest in setting of advanced Parkinsons. Daughter states she wants patient to remain FULL CODE; she states she will continue to speak with rest of family as well.     Discussed hospice philosophy of care/services. Daughter declined referral for hospice.     Emotional support provided.

## 2022-12-06 NOTE — CONSULT NOTE ADULT - PROBLEM SELECTOR RECOMMENDATION 7
Discussed with primary team regarding goals of care discussion     Thank you for allowing us to participate in your patient's care. Please page [phone] for any questions/concerns.

## 2022-12-06 NOTE — CONSULT NOTE ADULT - ASSESSMENT
89yoF hx hypothyroidism, AF, CHB s/p PPM, Parkinson disease, asthma, and recurrent UTI's admitted for sepsis  Palliative Care consulted for complex decision making  related to goals of care discussions

## 2022-12-06 NOTE — PROGRESS NOTE ADULT - SUBJECTIVE AND OBJECTIVE BOX
Patient is a 89y old  Female who presents with a chief complaint of b/l shaking (06 Dec 2022 14:08)    Date of servie : 22 @ 16:54  INTERVAL HPI/OVERNIGHT EVENTS:  T(C): 36.5 (22 @ 12:00), Max: 37.1 (22 @ 21:30)  HR: 85 (22 @ 12:00) (77 - 90)  BP: 160/82 (22 @ 12:00) (128/65 - 160/82)  RR: 20 (22 @ 12:00) (20 - 20)  SpO2: 98% (22 @ 12:00) (98% - 100%)  Wt(kg): --  I&O's Summary    05 Dec 2022 07:01  -  06 Dec 2022 07:00  --------------------------------------------------------  IN: 0 mL / OUT: 900 mL / NET: -900 mL        LABS:                        12.1   7.97  )-----------( 266      ( 06 Dec 2022 06:00 )             35.9     12    131<L>  |  99  |  15  ----------------------------<  87  3.7   |  21<L>  |  0.81    Ca    8.8      06 Dec 2022 06:00  Phos  3.4       Mg     2.10         TPro  7.1  /  Alb  2.7<L>  /  TBili  0.8  /  DBili  x   /  AST  31  /  ALT  36<H>  /  AlkPhos  153<H>  12    PT/INR - ( 04 Dec 2022 17:20 )   PT: 13.5 sec;   INR: 1.16 ratio         PTT - ( 04 Dec 2022 17:20 )  PTT:27.1 sec  Urinalysis Basic - ( 04 Dec 2022 17:20 )    Color: Yellow / Appearance: Clear / S.023 / pH: x  Gluc: x / Ketone: Negative  / Bili: Negative / Urobili: <2 mg/dL   Blood: x / Protein: 30 mg/dL / Nitrite: Negative   Leuk Esterase: Negative / RBC: 11-25 /HPF / WBC 0-2 /HPF   Sq Epi: x / Non Sq Epi: x / Bacteria: Negative      CAPILLARY BLOOD GLUCOSE            Urinalysis Basic - ( 04 Dec 2022 17:20 )    Color: Yellow / Appearance: Clear / S.023 / pH: x  Gluc: x / Ketone: Negative  / Bili: Negative / Urobili: <2 mg/dL   Blood: x / Protein: 30 mg/dL / Nitrite: Negative   Leuk Esterase: Negative / RBC: 11-25 /HPF / WBC 0-2 /HPF   Sq Epi: x / Non Sq Epi: x / Bacteria: Negative        MEDICATIONS  (STANDING):  budesonide 160 MICROgram(s)/formoterol 4.5 MICROgram(s) Inhaler 2 Puff(s) Inhalation two times a day  enoxaparin Injectable 30 milliGRAM(s) SubCutaneous every 24 hours  hydrALAZINE Injectable 5 milliGRAM(s) IV Push every 8 hours  lactated ringers. 1000 milliLiter(s) (75 mL/Hr) IV Continuous <Continuous>  levothyroxine Injectable 20 MICROGram(s) IV Push <User Schedule>  levothyroxine Injectable 40 MICROGram(s) IV Push <User Schedule>  metoprolol tartrate Injectable 5 milliGRAM(s) IV Push every 6 hours  piperacillin/tazobactam IVPB.. 3.375 Gram(s) IV Intermittent every 8 hours    MEDICATIONS  (PRN):  albuterol/ipratropium for Nebulization 3 milliLiter(s) Nebulizer every 6 hours PRN Shortness of Breath and/or Wheezing          PHYSICAL EXAM:  GENERAL:frail  CHEST/LUNG: Clear to percussion bilaterally; No rales, rhonchi, wheezing, or rubs  HEART: Regular rate and rhythm; No murmurs, rubs, or gallops  ABDOMEN: Soft, Nontender, Nondistended; Bowel sounds present  EXTREMITIES:  edema +    Care Discussed with Consultants/Other Providers [ ] YES  [ ] NO DISCHARGE

## 2022-12-06 NOTE — CONSULT NOTE ADULT - PROBLEM SELECTOR RECOMMENDATION 9
-resolved  -?aspiration  -u/a not suggestive of uti  -no PNA on imaging   -got zosyn x 1  -f/u blood cx  -monitor temps  -hold off further abx  -asp precautions
Patient requires total support for all ADL's.   Please assist with ADLs  Skin care as per protocol

## 2022-12-06 NOTE — PROGRESS NOTE ADULT - PROBLEM SELECTOR PLAN 1
-still with fever  -cx negative  -?aspiration   -start CTX 1 gm iv q24 for aspiration   -hand does not appear infected -still with fever  -cx negative  -?aspiration   -started on zosyn already - for aspiration   -hand does not appear infected

## 2022-12-06 NOTE — DIETITIAN INITIAL EVALUATION ADULT - OTHER INFO
89 year old Female hx hypothyroidism, AF, CHB s/p PPM, Parkinson disease, asthma, and recurrent UTI's p/w shaking, fever, per chart.  Patient is NPO during visit. As per chart review, patient is evaluated by speech on 12/5/2022, speech recommended consider NPO with consideration of short term nonoral means of nutrition. As per GOC note dated 12/6/2022, team discussed with daughter about pleasure feeds and tube feeding, daughter declined referral for hospice, patient remains full code at this time, GOC conversation on-going. If patient to remain NPO, suggest initiating alternate means of nutrition. Per Shannan LUEVANO, Height: 152.4cm. Current weight: 44.8kg/98.7lbs(12/6/2022, per flow sheet).

## 2022-12-06 NOTE — ADVANCED PRACTICE NURSE CONSULT - REASON FOR CONSULT
Patient seen on skin care rounds after wound care referral received for assessment of skin impairment and recommendations of topical management. Chart reviewed: WBC: (N), H&H: (N), INR: (N), A1C: (N), platelets: (N), BMI: (N) Taye (N), patient interviewed. Patient H/O of (INSERT HISTORY). Patient admitted with (ADMISSION).  Patient seen on skin care rounds after wound care referral received for assessment of skin impairment and recommendations of topical management of right palm wounds. Patient is a  89 YO Female with PMHx of Hypothyroidism, HTN, AFIB, CHB s/p PPM, Parkinson Disease, Asthma, and recurrent UTI's (last admission for [facility] UTI in 10/2020) presented with skaking, rigidity, fever, tachycardia and leukocytosis with concern for sepsis likely from recurrent UTI vs aspiration pneumonitis.     Chart reviewed: WBC: 7.97, H&H: 12.1/35.9, platelets: 266, INR: 1.16, Taye 9.

## 2022-12-06 NOTE — PHYSICAL THERAPY INITIAL EVALUATION ADULT - RANGE OF MOTION EXAMINATION, REHAB EVAL
right and left UE: shoulder flexion limited to 100 degrees/bilateral lower extremity ROM was WFL (within functional limits)/deficits as listed below

## 2022-12-06 NOTE — ADVANCED PRACTICE NURSE CONSULT - RECOMMEDATIONS
Topical Recommendations    R medial metacarpophalangeal (inner palm): Cleanse with NS, pat dry. Apply Liquid barrier film to periwound skin (allow to dry). Apply Medihoney gel to base of wound, cover with tefla. Apply rolled and taped kerlix to hand to help alleviate pressure. Wrap with kerlix and secure with paper tape. Change daily and PRN.  Topical Recommendations    R medial metacarpophalangeal (inner palm): Cleanse with NS, pat dry. Apply Liquid barrier film to periwound skin (allow to dry). Apply Medihoney gel to base of wound, cover with tefla. Apply rolled and taped kerlix to hand to help alleviate pressure. Wrap with kerlix and secure with paper tape. Change daily and PRN. Monitor for tissue type changes.    Apply small pillow in between knees to prevent pressure and friction.     L hand: Apply taped kerlix in between fingers and palm to prevent pressure.     Continue low air loss bed therapy, continue heel elevation, continue to turn & reposition per protocol, soft pillow between bony prominences, continue moisture management with barrier creams & single breathable pad, continue measures to decrease friction/shear/pressure. Continue with nutritional support as per dietary/orders.    Plan of care discussed with primary RN Nori and functional RN Lui.    Please contact Wound Care Service Line if we can be of further assistance (ext [phone]).

## 2022-12-06 NOTE — PHYSICAL THERAPY INITIAL EVALUATION ADULT - GENERAL OBSERVATIONS, REHAB EVAL
Pt received semi-supine, +IV, +singh, +telemetry monitor, NAD. Pt's daughter at bedside throughout. [facility]  used throughout, [id number]

## 2022-12-06 NOTE — CONSULT NOTE ADULT - SUBJECTIVE AND OBJECTIVE BOX
HPI:  89yoF hx hypothyroidism, AF, CHB s/p PPM, Parkinson disease, asthma, and recurrent UTI's p/w shaking. Per pt grandsons (HCP) pt has been having increase shaking for the past few months and has rigidity in her right and keeps it in a fist. it has gotten worse in the past 2 days and they were told to go to the ED. States that she shaan low grade fever of  at home. Denies any coughing with food. Per grandson, pt had similar symptoms in  when they found she had a UTI.  No nausea or vomiting. Pt at baseline is full nonverbal and bedbound per son.     In the ED, pt was febrile to 100.7, , /99 and saturating well on RA.  (04 Dec 2022 23:56)        PERTINENT PM/SXH:   Asthma  Pacemaker  Hypertension  Femur fracture, right    FAMILY HISTORY:  Family history of asthma (Sibling)    ITEMS NOT CHECKED ARE NOT PRESENT    SOCIAL HISTORY:   Significant other/partner[ ]  Children[ x]  Christianity/Spirituality:   Substance hx:  [ ]   Tobacco hx:  [ ]   Alcohol hx: [ ]   Home Opioid hx:  [ ] I-Stop Reference No:  Living Situation: [x ]Home  [ ]Long term care  [ ]Rehab [ ]Other    ADVANCE DIRECTIVES:    MOLST  [ ]  Living Will  [ ]   DECISION MAKER(s):  [ ] Health Care Proxy(s)  [ x] Surrogate(s)  [ ] Guardian           Name(s): Phone Number(s):  Daughter : Jay Moya  [phone]  Son: José Luis Vasyl: [phone]      BASELINE (I)ADL(s) (prior to admission):  [place]: [ ]Total  [ ] Moderate [ x]Dependent    Allergies    No Known Allergies    Intolerances    MEDICATIONS  (STANDING):  budesonide 160 MICROgram(s)/formoterol 4.5 MICROgram(s) Inhaler 2 Puff(s) Inhalation two times a day  enoxaparin Injectable 30 milliGRAM(s) SubCutaneous every 24 hours  hydrALAZINE Injectable 5 milliGRAM(s) IV Push every 8 hours  lactated ringers. 1000 milliLiter(s) (75 mL/Hr) IV Continuous <Continuous>  levothyroxine Injectable 20 MICROGram(s) IV Push <User Schedule>  levothyroxine Injectable 40 MICROGram(s) IV Push <User Schedule>  metoprolol tartrate Injectable 5 milliGRAM(s) IV Push every 6 hours  piperacillin/tazobactam IVPB.. 3.375 Gram(s) IV Intermittent every 8 hours    MEDICATIONS  (PRN):  albuterol/ipratropium for Nebulization 3 milliLiter(s) Nebulizer every 6 hours PRN Shortness of Breath and/or Wheezing      PRESENT SYMPTOMS: [x]Unable to self-report due to altered mental status  [ ] CPOT [ x] PAINADs [x ] RDOS  Source if other than patient:  [ ]Family   [ ]Team     Pain: [ ]yes [ ]no  QOL impact -   Location -                    Aggravating factors -  Quality -  Radiation -  Timing-  Severity (0-10 scale):  Minimal acceptable level (0-10 scale):     CPOT:    https://www.[facility].org/getattachment/[email or web address]-9k4r-0b7c-4489p1291x3p/Critical-Care-Pain-Observation-Tool-(CPOT)      Dyspnea:                           [ ]Mild [ ]Moderate [ ]Severe    Anxiety:                             [ ]Mild [ ]Moderate [ ]Severe  Agitation:                          [ ]Mild [ ]Moderate [ ]Severe  Fatigue:                             [ ]Mild [ ]Moderate [ ]Severe  Nausea:                             [ ]Mild [ ]Moderate [ ]Severe  Loss of appetite:              [ ]Mild [ ]Moderate [ ]Severe  Constipation:                   [ ]Mild [ ]Moderate [ ]Severe  Diarrhea:                          [ ]Mild [ ]Moderate [ ]Severe      PCSSQ[Palliative Care Spiritual Screening Question]   Severity (0-10):  Score of 4 or > indicate consideration of Chaplaincy referral.  Chaplaincy Referral: [ ] yes [ ] refused [ ] following [ x] deferred    Caregiver [place]? : [ ] yes [ ] no [ ] Declined   [x ] Deferred            Social work referral [ ] Patient & Family Centered Care Referral [ ]     Anticipatory Grief present?:  [ ] yes [ ] no  [x ] Deferred                  Social work referral [ ] Chaplaincy Referral[ ]    Other Symptoms:  [ ]All other review of systems negative - unable to obtain due to altered mental status    PHYSICAL EXAM:  Vital Signs Last 24 Hrs  T(C): 36.9 (06 Dec 2022 05:34), Max: 38 (05 Dec 2022 14:20)  T(F): 98.5 (06 Dec 2022 05:34), Max: 100.4 (05 Dec 2022 14:20)  HR: 85 (06 Dec 2022 05:34) (77 - 92)  BP: 133/84 (06 Dec 2022 05:34) (128/65 - 181/92)  BP(mean): --  RR: 20 (06 Dec 2022 05:34) (20 - 20)  SpO2: 100% (06 Dec 2022 05:34) (99% - 100%)    Parameters below as of 06 Dec 2022 05:34  Patient On (Oxygen Delivery Method): room air         I&O's Summary    05 Dec 2022 07:01  -  06 Dec 2022 07:00  --------------------------------------------------------  IN: 0 mL / OUT: 900 mL / NET: -900 mL        GENERAL:  [ ]Alert  [ ]Oriented x   [x ]Lethargic  [ ]Cachexia  [ ]Unarousable  [ ]Verbal  [x ]Non-Verbal  [ x] No Distress  Behavioral:   [ ] Anxiety  [ ] Delirium [ ] Agitation [x ] Calm  [ ] Other  HEENT:  [x ]Normal  [ ] Temporal Wasting  [ ]Dry mouth   [ ]ET Tube/Trach  [ ]Oral lesions  [ ] Mucositis  PULMONARY:   [ ]Clear [ ]Tachypnea  [ ]Audible excessive secretions   [ ]Rhonchi        [ ]Right [ ]Left [ ]Bilateral  [ ]Crackles        [ ]Right [ ]Left [ ]Bilateral  [ ]Wheezing     [ ]Right [ ]Left [ ]Bilateral  [ x]Diminished breath sounds [ ]right [ ]left [x ]bilateral  CARDIOVASCULAR:    [ x]Regular [ ]Irregular [ ]Tachy  [ ]Satish [ ]Murmur [ ]Other  GASTROINTESTINAL:  [ x]Soft  [ ]Distended   [ ]+BS  [x ]Non tender [ ]Tender  [ ]PEG [ ]OGT/ NGT  Last BM:   GENITOURINARY:  [ ]Normal [ ] Incontinent   [ ]Oliguria/Anuria   [x ]Perez  MUSCULOSKELETAL:   [ ]Normal   [ ]Weakness  [ x]Bed/Wheelchair bound [ ]Edema  [  ] amputation  [  ] contraction  NEUROLOGIC:   [ ]No focal deficits  [x ]Cognitive impairment  [x ]Dysphagia [ ]Dysarthria [ ]Paresis [ ]Other   SKIN: See Nursing Skin Assessment for further details  [ ]Normal    [ ]Rash  [ ]Pressure ulcer(s)       Present on admission [ ]y [ ]n   [x  ]  Wound  - right palm  [  ] hyperpigmentation    CRITICAL CARE:  [ ] Shock Present  [ ]Septic [ ]Cardiogenic [ ]Neurologic [ ]Hypovolemic  [ ]  Vasopressors [ ]  Inotropes   [ ]Respiratory failure present [ ]Mechanical ventilation [ ]Non-invasive ventilatory support [ ]High flow    [ ]Acute  [ ]Chronic [ ]Hypoxic  [ ]Hypercarbic [ ]Other  [ ]Other organ failure     LABS:                        12.1   7.97  )-----------( 266      ( 06 Dec 2022 06:00 )             35.9   12-    131<L>  |  99  |  15  ----------------------------<  87  3.7   |  21<L>  |  0.81    Ca    8.8      06 Dec 2022 06:00  Phos  3.4     12  Mg     2.10     12-    TPro  7.1  /  Alb  2.7<L>  /  TBili  0.8  /  DBili  x   /  AST  31  /  ALT  36<H>  /  AlkPhos  153<H>  12-06  PT/INR - ( 04 Dec 2022 17:20 )   PT: 13.5 sec;   INR: 1.16 ratio         PTT - ( 04 Dec 2022 17:20 )  PTT:27.1 sec  Urinalysis Basic - ( 04 Dec 2022 17:20 )    Color: Yellow / Appearance: Clear / S.023 / pH: x  Gluc: x / Ketone: Negative  / Bili: Negative / Urobili: <2 mg/dL   Blood: x / Protein: 30 mg/dL / Nitrite: Negative   Leuk Esterase: Negative / RBC: 11-25 /HPF / WBC 0-2 /HPF   Sq Epi: x / Non Sq Epi: x / Bacteria: Negative      CAPILLARY BLOOD GLUCOSE    RADIOLOGY & ADDITIONAL STUDIES:  CTA Chest 2022  IMPRESSION:  Scattered areas of subsegmental atelectasis within the lungs. No focal alveolar infiltrate/consolidation.    CT Head 2022  No acute intracranial abnormalities.  Small vessel and atrophic changes.    CXRAY 2022  IMPRESSION:  No focal consolidation.    PROTEIN CALORIE MALNUTRITION PRESENT: [ ]mild [ ]moderate [ ]severe [ ]underweight [ ]morbid obesity  https://www.andeal.org/vault/2440/web/files/ONC/Table_Clinical%20Characteristics%20to%20Document%20Malnutrition-White%20JV%20et%20al%2020.pdf      Weight (kg): 44.8 (22 @ 08:15)    [x ]PPSV2 < or = to 30% [ ]significant weight loss  [ ]poor nutritional intake  [ ]anasarca [ ]Artificial Nutrition      REFERRALS:   [ ]Chaplaincy  [ ]Hospice  [ ]Child Life  [ ]Social Work  [x ]Case management [ ]Holistic Therapy     Goals of Care Document: MICA Hooker (22 @ 14:50)  Goals of Care Conversation:   Conversation Discussion:  · Conversation Details  89 YO Female with PMHx of Hypothyroidism, HTN, AFIB, CHB s/p PPM, Parkinson Disease, Asthma, and recurrent UTI's (last admission for [facility] UTI in 10/2020) presented with fever, tachycardia and leukocytosis with concern for sepsis likely from recurrent UTI vs aspiration pneumonitis. Family also noted with worsening if Parkinsonian symptoms with resting tremors and rigidity, and overall AOx1 at baseline, bedbound and minimally verbal. Patient seen in past by SS in 2020 and at the time oral feeds was contrindicated however family wished for pleasure feeds and full code.     Patient now returned with infectious concern (UTI vs PNA) and worsening/ progression of Parkinsonian Disease. Patient has NOT followed with Neurology in over 2 years and family notes she is more bed bound and minimally verbal. Case discussed with Grand-Son/ HCP, Tory ([phone]) by bedside via phona and explained current medical conditions, prognosis, concern for worsening disease, and overall poor prognosis at [facility]. Tory expressed comprehension and wished to discuss case with his Family prior to making further discussion on [facility]. Explained Palliative consult and in acceptance to discuss case further.      Electronic Signatures:  Cristobal Hooker (PA)  (Signed 05-Dec-2022 14:50)  	Authored: Goals of Care Conversation      Last Updated: 05-Dec-2022 14:50 by Cristobal Hooker (PA)

## 2022-12-06 NOTE — PHYSICAL THERAPY INITIAL EVALUATION ADULT - ADDITIONAL COMMENTS
History obtained via pt's daughter: pt lives with her daughter in a house. Pt is mostly bedbound and occasionally transfers to the wheelchair dependently. prior to admission pt was dependent for all ADLs.     Pt. left comfortable in bed with all tubes/lines intact, +bed alarm, call bell in reach and in NAD.

## 2022-12-06 NOTE — DIETITIAN INITIAL EVALUATION ADULT - PERTINENT LABORATORY DATA
12-06    131<L>  |  99  |  15  ----------------------------<  87  3.7   |  21<L>  |  0.81    Ca    8.8      06 Dec 2022 06:00  Phos  3.4     12-06  Mg     2.10     12-06    TPro  7.1  /  Alb  2.7<L>  /  TBili  0.8  /  DBili  x   /  AST  31  /  ALT  36<H>  /  AlkPhos  153<H>  12-06

## 2022-12-06 NOTE — CONSULT NOTE ADULT - PROBLEM SELECTOR RECOMMENDATION 2
- due to advanced Parkinsons   - CT Head - No acute intracranial abnormalities. Small vessel and atrophic changes.  - Please coordinate care with patient's family

## 2022-12-06 NOTE — ADVANCED PRACTICE NURSE CONSULT - ASSESSMENT
General: A&Ox0, nonverbal, cachectic with temporal wasting, bedbound with contracted limbs x4, incontinent of stool with indwelling singh catheter in place. Skin warm, dry with increased moisture in intertriginous folds, scattered areas of hyperpigmentation and hypopigmentation, scattered areas of ecchymosis on bilateral upper extremities. Slow blanching erythema less than 3 seconds on bilateral heels.     R medial metacarpophalangeal (inner palm): 3 individual wounds on the 1st to 3rd digit medial metacarpophalangeal head each measuring approximately 2cmx2.3cmx0.1cm with mixed tissue type; 70% red, moist dermis and 30% yellow/brown moist softening slough. Scant serosanguinous drainage with no odor noted. Periwound with blanchable erythema not extending >2cm. No induration, no edema, no temperature changes, no overt signs of infection noted.  General: A&Ox0, nonverbal, cachectic with temporal wasting, bedbound with contracted limbs x4 and BL hands contracted into palms, incontinent of stool with indwelling singh catheter in place. Skin warm, dry with increased moisture in intertriginous folds, scattered areas of hyperpigmentation and hypopigmentation, scattered areas of ecchymosis on bilateral upper extremities. Slow blanching erythema less than 3 seconds on bilateral heels.     R medial metacarpophalangeal (inner palm) pressure injuries: 3 individual pressure injuries on the 1st to 3rd digit medial metacarpophalangeal head each measuring approximately 2.3cmx2.1cmx0.1cm with 1cm of intact epidermis  each pressure injury, cluster measurement 2.3cmx8.5cmx0.1cm with mixed tissue type; 70% red, moist dermis and 30% yellow/brown moist softening slough. Scant serosanguinous drainage with no odor noted. Periwound with blanchable erythema not extending >2cm. No induration, no edema, no temperature changes, no overt signs of infection noted. Goals of care: prevent pressure, friction, shearing, maintain moist wound healing environment, monitor for tissue type changes.

## 2022-12-06 NOTE — DIETITIAN INITIAL EVALUATION ADULT - ORAL INTAKE PTA/DIET HISTORY
Patient is A&Ox1 , patient's family by bedside provided information during interview. Granddaughter and daughter reports patient was consuming full liquid diet at home for ~1 year, reports taking Ensure supplement 2x/day and apple juice at home. Family reports weight loss over the past 1.5 years. Patient has no known food allergies.  Patient is A&Ox1, patient's family by bedside provided information during interview. Granddaughter and daughter reports patient was consuming full liquid diet at home for ~1 year, reports taking Ensure supplement 2x/day and apple juice at home. Family reports weight loss over the past 1.5 years. Patient has no known food allergies.

## 2022-12-06 NOTE — PROGRESS NOTE ADULT - SUBJECTIVE AND OBJECTIVE BOX
BETHANY JOSHI 89y [id number]    Patient is a 89y old  Female who presents with a chief complaint of b/l shaking (05 Dec 2022 16:28)      Follow Up/CC:  ID following for hand swelling    Interval History/ROS: fever+    Allergies    No Known Allergies    Intolerances        ANTIMICROBIALS:  piperacillin/tazobactam IVPB.. 3.375 every 8 hours      MEDICATIONS  (STANDING):  budesonide 160 MICROgram(s)/formoterol 4.5 MICROgram(s) Inhaler 2 Puff(s) Inhalation two times a day  enoxaparin Injectable 30 milliGRAM(s) SubCutaneous every 24 hours  hydrALAZINE Injectable 5 milliGRAM(s) IV Push every 8 hours  lactated ringers. 1000 milliLiter(s) (75 mL/Hr) IV Continuous <Continuous>  levothyroxine Injectable 20 MICROGram(s) IV Push <User Schedule>  levothyroxine Injectable 40 MICROGram(s) IV Push <User Schedule>  metoprolol tartrate Injectable 5 milliGRAM(s) IV Push every 6 hours  piperacillin/tazobactam IVPB.. 3.375 Gram(s) IV Intermittent every 8 hours    MEDICATIONS  (PRN):  albuterol/ipratropium for Nebulization 3 milliLiter(s) Nebulizer every 6 hours PRN Shortness of Breath and/or Wheezing        Vital Signs Last 24 Hrs  T(C): 36.9 (06 Dec 2022 05:34), Max: 38 (05 Dec 2022 14:20)  T(F): 98.5 (06 Dec 2022 05:34), Max: 100.4 (05 Dec 2022 14:20)  HR: 85 (06 Dec 2022 05:34) (77 - 92)  BP: 133/84 (06 Dec 2022 05:34) (128/65 - 181/92)  BP(mean): --  RR: 20 (06 Dec 2022 05:34) (20 - 20)  SpO2: 100% (06 Dec 2022 05:34) (99% - 100%)    Parameters below as of 06 Dec 2022 05:34  Patient On (Oxygen Delivery Method): room air        CBC Full  -  ( 06 Dec 2022 06:00 )  WBC Count : 7.97 K/uL  RBC Count : 3.84 M/uL  Hemoglobin : 12.1 g/dL  Hematocrit : 35.9 %  Platelet Count - Automated : 266 K/uL  Mean Cell Volume : 93.5 fL  Mean Cell Hemoglobin : 31.5 pg  Mean Cell Hemoglobin Concentration : 33.7 gm/dL  Auto Neutrophil # : 5.84 K/uL  Auto Lymphocyte # : 1.41 K/uL  Auto Monocyte # : 0.64 K/uL  Auto Eosinophil # : 0.03 K/uL  Auto Basophil # : 0.02 K/uL  Auto Neutrophil % : 73.2 %  Auto Lymphocyte % : 17.7 %  Auto Monocyte % : 8.0 %  Auto Eosinophil % : 0.4 %  Auto Basophil % : 0.3 %        131<L>  |  99  |  15  ----------------------------<  87  3.7   |  21<L>  |  0.81    Ca    8.8      06 Dec 2022 06:00  Phos  3.4       Mg     2.10         TPro  7.1  /  Alb  2.7<L>  /  TBili  0.8  /  DBili  x   /  AST  31  /  ALT  36<H>  /  AlkPhos  153<H>      LIVER FUNCTIONS - ( 06 Dec 2022 06:00 )  Alb: 2.7 g/dL / Pro: 7.1 g/dL / ALK PHOS: 153 U/L / ALT: 36 U/L / AST: 31 U/L / GGT: x           Urinalysis Basic - ( 04 Dec 2022 17:20 )    Color: Yellow / Appearance: Clear / S.023 / pH: x  Gluc: x / Ketone: Negative  / Bili: Negative / Urobili: <2 mg/dL   Blood: x / Protein: 30 mg/dL / Nitrite: Negative   Leuk Esterase: Negative / RBC: 11-25 /HPF / WBC 0-2 /HPF   Sq Epi: x / Non Sq Epi: x / Bacteria: Negative        MICROBIOLOGY:  .Blood Blood-Peripheral  22   No growth to date.  --  --      .Blood Blood-Peripheral  22   No growth to date.  --  --              v    Rapid RVP Result: Katerynate ( @ 18:00)          RADIOLOGY

## 2022-12-06 NOTE — PHYSICAL THERAPY INITIAL EVALUATION ADULT - NSPTDISCHREC_GEN_A_CORE
pt. not placed on skilled PT services at this time secondary to pt. presenting with confusion and unable to actively participate in PT services. Per pt's daughter, pt is at baseline level of functioning. Please reconsult if appropriate/feasible.

## 2022-12-06 NOTE — DIETITIAN INITIAL EVALUATION ADULT - ADD RECOMMEND
1. Defer Nutrition POC to MD based on GOC discussion with family.  2. If patient to remain NPO, suggest initiating alternate means of nutrition.  3. Recommend adding vitamin c and multivitamin to promote wound healing.   4. Monitor intake if/when initiated.  5. Monitor labs and hydration status.   6. RD remains available, please reconsult nutrition service as needed.  1. Initiate PO diet, when medical feasible. Diet consistency defer to team.   2. If patient is unable to tolerate po diet or to remain NPO, suggest initiating alternate means of nutrition.  3. Defer Nutrition POC to MD based on ongoing GOC discussion with family.  4. Recommend adding vitamin c and multivitamin to promote wound healing.   5. Monitor intake if/when initiated.  6. Monitor labs and hydration status.   7. RD remains available, please reconsult nutrition service as needed.

## 2022-12-06 NOTE — CONSULT NOTE ADULT - PROBLEM/RECOMMENDATION-2
[facility] [address] [address] [facility], DISPLAY PLAN FREE TEXT

## 2022-12-07 LAB
ANION GAP SERPL CALC-SCNC: 15 MMOL/L — HIGH (ref 7–14)
BASOPHILS # BLD AUTO: 0.04 K/UL — SIGNIFICANT CHANGE UP (ref 0–0.2)
BASOPHILS NFR BLD AUTO: 0.6 % — SIGNIFICANT CHANGE UP (ref 0–2)
BUN SERPL-MCNC: 17 MG/DL — SIGNIFICANT CHANGE UP (ref 7–23)
CALCIUM SERPL-MCNC: 8.3 MG/DL — LOW (ref 8.4–10.5)
CHLORIDE SERPL-SCNC: 101 MMOL/L — SIGNIFICANT CHANGE UP (ref 98–107)
CO2 SERPL-SCNC: 19 MMOL/L — LOW (ref 22–31)
CREAT SERPL-MCNC: 0.94 MG/DL — SIGNIFICANT CHANGE UP (ref 0.5–1.3)
EGFR: 57 ML/MIN/1.73M2 — LOW
EOSINOPHIL # BLD AUTO: 0.01 K/UL — SIGNIFICANT CHANGE UP (ref 0–0.5)
EOSINOPHIL NFR BLD AUTO: 0.2 % — SIGNIFICANT CHANGE UP (ref 0–6)
GLUCOSE BLDC GLUCOMTR-MCNC: 82 MG/DL — SIGNIFICANT CHANGE UP (ref 70–99)
GLUCOSE BLDC GLUCOMTR-MCNC: 83 MG/DL — SIGNIFICANT CHANGE UP (ref 70–99)
GLUCOSE BLDC GLUCOMTR-MCNC: 84 MG/DL — SIGNIFICANT CHANGE UP (ref 70–99)
GLUCOSE BLDC GLUCOMTR-MCNC: 92 MG/DL — SIGNIFICANT CHANGE UP (ref 70–99)
GLUCOSE SERPL-MCNC: 89 MG/DL — SIGNIFICANT CHANGE UP (ref 70–99)
HCT VFR BLD CALC: 33.1 % — LOW (ref 34.5–45)
HGB BLD-MCNC: 11.2 G/DL — LOW (ref 11.5–15.5)
IANC: 4.93 K/UL — SIGNIFICANT CHANGE UP (ref 1.8–7.4)
IMM GRANULOCYTES NFR BLD AUTO: 0.6 % — SIGNIFICANT CHANGE UP (ref 0–0.9)
LYMPHOCYTES # BLD AUTO: 0.83 K/UL — LOW (ref 1–3.3)
LYMPHOCYTES # BLD AUTO: 13.1 % — SIGNIFICANT CHANGE UP (ref 13–44)
MAGNESIUM SERPL-MCNC: 1.7 MG/DL — SIGNIFICANT CHANGE UP (ref 1.6–2.6)
MCHC RBC-ENTMCNC: 31.8 PG — SIGNIFICANT CHANGE UP (ref 27–34)
MCHC RBC-ENTMCNC: 33.8 GM/DL — SIGNIFICANT CHANGE UP (ref 32–36)
MCV RBC AUTO: 94 FL — SIGNIFICANT CHANGE UP (ref 80–100)
MONOCYTES # BLD AUTO: 0.49 K/UL — SIGNIFICANT CHANGE UP (ref 0–0.9)
MONOCYTES NFR BLD AUTO: 7.7 % — SIGNIFICANT CHANGE UP (ref 2–14)
NEUTROPHILS # BLD AUTO: 4.93 K/UL — SIGNIFICANT CHANGE UP (ref 1.8–7.4)
NEUTROPHILS NFR BLD AUTO: 77.8 % — HIGH (ref 43–77)
NRBC # BLD: 0 /100 WBCS — SIGNIFICANT CHANGE UP (ref 0–0)
NRBC # FLD: 0 K/UL — SIGNIFICANT CHANGE UP (ref 0–0)
PHOSPHATE SERPL-MCNC: 3.1 MG/DL — SIGNIFICANT CHANGE UP (ref 2.5–4.5)
PLATELET # BLD AUTO: 278 K/UL — SIGNIFICANT CHANGE UP (ref 150–400)
POTASSIUM SERPL-MCNC: 3.7 MMOL/L — SIGNIFICANT CHANGE UP (ref 3.5–5.3)
POTASSIUM SERPL-SCNC: 3.7 MMOL/L — SIGNIFICANT CHANGE UP (ref 3.5–5.3)
RBC # BLD: 3.52 M/UL — LOW (ref 3.8–5.2)
RBC # FLD: 13.3 % — SIGNIFICANT CHANGE UP (ref 10.3–14.5)
SODIUM SERPL-SCNC: 135 MMOL/L — SIGNIFICANT CHANGE UP (ref 135–145)
WBC # BLD: 6.34 K/UL — SIGNIFICANT CHANGE UP (ref 3.8–10.5)
WBC # FLD AUTO: 6.34 K/UL — SIGNIFICANT CHANGE UP (ref 3.8–10.5)

## 2022-12-07 PROCEDURE — 99232 SBSQ HOSP IP/OBS MODERATE 35: CPT

## 2022-12-07 RX ADMIN — ENOXAPARIN SODIUM 30 MILLIGRAM(S): 100 INJECTION SUBCUTANEOUS at 11:26

## 2022-12-07 RX ADMIN — BUDESONIDE AND FORMOTEROL FUMARATE DIHYDRATE 2 PUFF(S): 160; 4.5 AEROSOL RESPIRATORY (INHALATION) at 22:02

## 2022-12-07 RX ADMIN — Medication 40 MICROGRAM(S): at 05:26

## 2022-12-07 RX ADMIN — Medication 5 MILLIGRAM(S): at 11:26

## 2022-12-07 RX ADMIN — Medication 5 MILLIGRAM(S): at 05:26

## 2022-12-07 RX ADMIN — BUDESONIDE AND FORMOTEROL FUMARATE DIHYDRATE 2 PUFF(S): 160; 4.5 AEROSOL RESPIRATORY (INHALATION) at 09:16

## 2022-12-07 RX ADMIN — Medication 5 MILLIGRAM(S): at 01:12

## 2022-12-07 RX ADMIN — PIPERACILLIN AND TAZOBACTAM 25 GRAM(S): 4; .5 INJECTION, POWDER, LYOPHILIZED, FOR SOLUTION INTRAVENOUS at 22:01

## 2022-12-07 RX ADMIN — Medication 5 MILLIGRAM(S): at 22:01

## 2022-12-07 RX ADMIN — PIPERACILLIN AND TAZOBACTAM 25 GRAM(S): 4; .5 INJECTION, POWDER, LYOPHILIZED, FOR SOLUTION INTRAVENOUS at 05:27

## 2022-12-07 RX ADMIN — Medication 5 MILLIGRAM(S): at 17:31

## 2022-12-07 RX ADMIN — Medication 5 MILLIGRAM(S): at 14:49

## 2022-12-07 RX ADMIN — PIPERACILLIN AND TAZOBACTAM 25 GRAM(S): 4; .5 INJECTION, POWDER, LYOPHILIZED, FOR SOLUTION INTRAVENOUS at 15:00

## 2022-12-07 NOTE — SWALLOW BEDSIDE ASSESSMENT ADULT - ADDITIONAL RECOMMENDATIONS
This department to follow as schedule permits. Medical team further advised to reconsult this service as patient becomes medically optimized to assess for safe oral diet program.
This department to follow up as schedule permits to assess for PO candidacy. Medical team further advised to reconsult if there as patient becomes medically optimized.

## 2022-12-07 NOTE — PROGRESS NOTE ADULT - SUBJECTIVE AND OBJECTIVE BOX
Patient is a 89y old  Female who presents with a chief complaint of b/l shaking (07 Dec 2022 10:50)    Date of servie : 12-07-22 @ 14:52  INTERVAL HPI/OVERNIGHT EVENTS:  T(C): 37.1 (12-07-22 @ 11:14), Max: 37.2 (12-07-22 @ 05:20)  HR: 77 (12-07-22 @ 14:43) (77 - 101)  BP: 135/62 (12-07-22 @ 14:43) (122/94 - 139/60)  RR: 19 (12-07-22 @ 11:14) (19 - 19)  SpO2: 98% (12-07-22 @ 11:14) (97% - 100%)  Wt(kg): --  I&O's Summary    06 Dec 2022 07:01  -  07 Dec 2022 07:00  --------------------------------------------------------  IN: 0 mL / OUT: 300 mL / NET: -300 mL        LABS:                        11.2   6.34  )-----------( 278      ( 07 Dec 2022 06:16 )             33.1     12-07    135  |  101  |  17  ----------------------------<  89  3.7   |  19<L>  |  0.94    Ca    8.3<L>      07 Dec 2022 06:16  Phos  3.1     12-07  Mg     1.70     12-07    TPro  7.1  /  Alb  2.7<L>  /  TBili  0.8  /  DBili  x   /  AST  31  /  ALT  36<H>  /  AlkPhos  153<H>  12-06        CAPILLARY BLOOD GLUCOSE      POCT Blood Glucose.: 92 mg/dL (07 Dec 2022 11:38)  POCT Blood Glucose.: 82 mg/dL (07 Dec 2022 05:33)  POCT Blood Glucose.: 84 mg/dL (07 Dec 2022 01:12)  POCT Blood Glucose.: 98 mg/dL (06 Dec 2022 18:03)            MEDICATIONS  (STANDING):  budesonide 160 MICROgram(s)/formoterol 4.5 MICROgram(s) Inhaler 2 Puff(s) Inhalation two times a day  enoxaparin Injectable 30 milliGRAM(s) SubCutaneous every 24 hours  hydrALAZINE Injectable 5 milliGRAM(s) IV Push every 8 hours  lactated ringers. 1000 milliLiter(s) (75 mL/Hr) IV Continuous <Continuous>  levothyroxine Injectable 20 MICROGram(s) IV Push <User Schedule>  levothyroxine Injectable 40 MICROGram(s) IV Push <User Schedule>  metoprolol tartrate Injectable 5 milliGRAM(s) IV Push every 6 hours  piperacillin/tazobactam IVPB.. 3.375 Gram(s) IV Intermittent every 8 hours    MEDICATIONS  (PRN):  albuterol/ipratropium for Nebulization 3 milliLiter(s) Nebulizer every 6 hours PRN Shortness of Breath and/or Wheezing          PHYSICAL EXAM:  GENERAL: frail  CHEST/LUNG: Clear to percussion bilaterally; No rales, rhonchi, wheezing, or rubs  HEART: Regular rate and rhythm; No murmurs, rubs, or gallops  ABDOMEN: Soft, Nontender, Nondistended; Bowel sounds present  EXTREMITIES: no edema +    Care Discussed with Consultants/Other Providers [ x] YES  [ ] NO

## 2022-12-07 NOTE — PROGRESS NOTE ADULT - SUBJECTIVE AND OBJECTIVE BOX
BETHANY JOSHI 89y [id number]    Patient is a 89y old  Female who presents with a chief complaint of b/l shaking (06 Dec 2022 16:54)      Follow Up/CC:  ID following for fever    Interval History/ROS: no fever    Allergies    No Known Allergies    Intolerances        ANTIMICROBIALS:  piperacillin/tazobactam IVPB.. 3.375 every 8 hours      MEDICATIONS  (STANDING):  budesonide 160 MICROgram(s)/formoterol 4.5 MICROgram(s) Inhaler 2 Puff(s) Inhalation two times a day  enoxaparin Injectable 30 milliGRAM(s) SubCutaneous every 24 hours  hydrALAZINE Injectable 5 milliGRAM(s) IV Push every 8 hours  lactated ringers. 1000 milliLiter(s) (75 mL/Hr) IV Continuous <Continuous>  levothyroxine Injectable 20 MICROGram(s) IV Push <User Schedule>  levothyroxine Injectable 40 MICROGram(s) IV Push <User Schedule>  metoprolol tartrate Injectable 5 milliGRAM(s) IV Push every 6 hours  piperacillin/tazobactam IVPB.. 3.375 Gram(s) IV Intermittent every 8 hours    MEDICATIONS  (PRN):  albuterol/ipratropium for Nebulization 3 milliLiter(s) Nebulizer every 6 hours PRN Shortness of Breath and/or Wheezing        Vital Signs Last 24 Hrs  T(C): 37.1 (07 Dec 2022 11:14), Max: 37.2 (07 Dec 2022 05:20)  T(F): 98.8 (07 Dec 2022 11:14), Max: 99 (07 Dec 2022 05:20)  HR: 77 (07 Dec 2022 14:43) (77 - 101)  BP: 135/62 (07 Dec 2022 14:43) (122/94 - 139/60)  BP(mean): --  RR: 19 (07 Dec 2022 11:14) (19 - 19)  SpO2: 98% (07 Dec 2022 11:14) (97% - 100%)    Parameters below as of 07 Dec 2022 11:14  Patient On (Oxygen Delivery Method): room air        CBC Full  -  ( 07 Dec 2022 06:16 )  WBC Count : 6.34 K/uL  RBC Count : 3.52 M/uL  Hemoglobin : 11.2 g/dL  Hematocrit : 33.1 %  Platelet Count - Automated : 278 K/uL  Mean Cell Volume : 94.0 fL  Mean Cell Hemoglobin : 31.8 pg  Mean Cell Hemoglobin Concentration : 33.8 gm/dL  Auto Neutrophil # : 4.93 K/uL  Auto Lymphocyte # : 0.83 K/uL  Auto Monocyte # : 0.49 K/uL  Auto Eosinophil # : 0.01 K/uL  Auto Basophil # : 0.04 K/uL  Auto Neutrophil % : 77.8 %  Auto Lymphocyte % : 13.1 %  Auto Monocyte % : 7.7 %  Auto Eosinophil % : 0.2 %  Auto Basophil % : 0.6 %    12-07    135  |  101  |  17  ----------------------------<  89  3.7   |  19<L>  |  0.94    Ca    8.3<L>      07 Dec 2022 06:16  Phos  3.1     12-07  Mg     1.70     12-07    TPro  7.1  /  Alb  2.7<L>  /  TBili  0.8  /  DBili  x   /  AST  31  /  ALT  36<H>  /  AlkPhos  153<H>  12-06    LIVER FUNCTIONS - ( 06 Dec 2022 06:00 )  Alb: 2.7 g/dL / Pro: 7.1 g/dL / ALK PHOS: 153 U/L / ALT: 36 U/L / AST: 31 U/L / GGT: x               MICROBIOLOGY:  .Blood Blood-Peripheral  12-04-22   No growth to date.  --  --      .Blood Blood-Peripheral  12-04-22   No growth to date.  --  --              v    Rapid RVP Result: Kia (12-04 @ 18:00)          RADIOLOGY

## 2022-12-07 NOTE — PROGRESS NOTE ADULT - PROBLEM SELECTOR PLAN 1
-still with fever  -cx negative  -?aspiration   -started on zosyn already - for aspiration   -hand does not appear infected

## 2022-12-07 NOTE — CHART NOTE - NSCHARTNOTEFT_GEN_A_CORE
Case discussed with daughter x 35 minutes.  S&S- NPO, TTE ordered.  Chris is in.  Crista LOU Case discussed with daughter x 35 minutes.  S&S- NPO, Not a candidate for Peg placement due to her status.  TTE ordered.  Chris is in. Will obtain labs.   Crista LOU

## 2022-12-07 NOTE — SWALLOW BEDSIDE ASSESSMENT ADULT - COMMENTS
Progress Note- Hospitalist 12/6: "89yoF hx hypothyroidism, AF, CHB s/p PPM, Parkinson disease, asthma, and recurrent UTI's admitted for sepsis."    CR Chest 12/4: "IMPRESSION: Scattered areas of subsegmental atelectasis within the lungs. No focal alveolar infiltrate/consolidation."    CT Head 12/4: "IMPRESSION: No acute intracranial abnormalities. Small vessel and atrophic changes."    Patient seen at bedside this AM with daughter present at bedside with patient not in alert state despite verbal and tactile stimuli and oral stimulation wth oral swabbed from daughter. Patient did not open eyes. RN and nursing aide arrived and also indicated that patient is not alert for clinical swallow evaluation. PO trials deferred at this time. Called out to ACP ([id number]). Progress Note- Hospitalist 12/6: "89yoF hx hypothyroidism, AF, CHB s/p PPM, Parkinson disease, asthma, and recurrent UTI's admitted for sepsis."    CR Chest 12/4: "IMPRESSION: Scattered areas of subsegmental atelectasis within the lungs. No focal alveolar infiltrate/consolidation."    CT Head 12/4: "IMPRESSION: No acute intracranial abnormalities. Small vessel and atrophic changes."    Patient seen at bedside this AM with daughter present at bedside with patient not in alert state despite verbal and tactile stimuli and oral stimulation wth oral swabbed from daughter. Patient did not open eyes. RN and nursing aide arrived and also indicated that patient is not alert for clinical swallow evaluation. PO trials deferred at this time. ACP ([id number]) reached and informed.

## 2022-12-07 NOTE — PROGRESS NOTE ADULT - CONVERSATION DETAILS
hospice and gOC discussed with daughter, she will discuss with the rest of the family and let me know the final decision in am

## 2022-12-07 NOTE — SWALLOW BEDSIDE ASSESSMENT ADULT - SWALLOW EVAL: RECOMMENDED DIET
Consider NPO with consideration of short term nonoral means of nutrition.
Consider NPO with consideration for short term nonoral means of nutrition.

## 2022-12-07 NOTE — PROGRESS NOTE ADULT - NUTRITIONAL ASSESSMENT
This patient has been assessed with a concern for Malnutrition and has been determined to have a diagnosis/diagnoses of Severe protein-calorie malnutrition.    This patient is being managed with:   Diet NPO-  Entered: Dec  4 2022 11:55PM

## 2022-12-07 NOTE — SWALLOW BEDSIDE ASSESSMENT ADULT - ASR SWALLOW RECOMMEND DIAG
Objective testing not warranted at this time given patient with reduced mentation/cognitive state at this time.
Objective testing not warranted at this time given patient's current mentation/cognitive state.

## 2022-12-08 LAB
ANION GAP SERPL CALC-SCNC: 12 MMOL/L — SIGNIFICANT CHANGE UP (ref 7–14)
BUN SERPL-MCNC: 14 MG/DL — SIGNIFICANT CHANGE UP (ref 7–23)
CALCIUM SERPL-MCNC: 8.5 MG/DL — SIGNIFICANT CHANGE UP (ref 8.4–10.5)
CHLORIDE SERPL-SCNC: 103 MMOL/L — SIGNIFICANT CHANGE UP (ref 98–107)
CO2 SERPL-SCNC: 21 MMOL/L — LOW (ref 22–31)
CREAT SERPL-MCNC: 0.79 MG/DL — SIGNIFICANT CHANGE UP (ref 0.5–1.3)
EGFR: 71 ML/MIN/1.73M2 — SIGNIFICANT CHANGE UP
GLUCOSE BLDC GLUCOMTR-MCNC: 88 MG/DL — SIGNIFICANT CHANGE UP (ref 70–99)
GLUCOSE BLDC GLUCOMTR-MCNC: 88 MG/DL — SIGNIFICANT CHANGE UP (ref 70–99)
GLUCOSE SERPL-MCNC: 76 MG/DL — SIGNIFICANT CHANGE UP (ref 70–99)
HCT VFR BLD CALC: 34 % — LOW (ref 34.5–45)
HGB BLD-MCNC: 11.1 G/DL — LOW (ref 11.5–15.5)
MAGNESIUM SERPL-MCNC: 1.8 MG/DL — SIGNIFICANT CHANGE UP (ref 1.6–2.6)
MCHC RBC-ENTMCNC: 30.8 PG — SIGNIFICANT CHANGE UP (ref 27–34)
MCHC RBC-ENTMCNC: 32.6 GM/DL — SIGNIFICANT CHANGE UP (ref 32–36)
MCV RBC AUTO: 94.4 FL — SIGNIFICANT CHANGE UP (ref 80–100)
NRBC # BLD: 0 /100 WBCS — SIGNIFICANT CHANGE UP (ref 0–0)
NRBC # FLD: 0 K/UL — SIGNIFICANT CHANGE UP (ref 0–0)
PHOSPHATE SERPL-MCNC: 2.9 MG/DL — SIGNIFICANT CHANGE UP (ref 2.5–4.5)
PLATELET # BLD AUTO: 276 K/UL — SIGNIFICANT CHANGE UP (ref 150–400)
POTASSIUM SERPL-MCNC: 3.7 MMOL/L — SIGNIFICANT CHANGE UP (ref 3.5–5.3)
POTASSIUM SERPL-SCNC: 3.7 MMOL/L — SIGNIFICANT CHANGE UP (ref 3.5–5.3)
RBC # BLD: 3.6 M/UL — LOW (ref 3.8–5.2)
RBC # FLD: 13.2 % — SIGNIFICANT CHANGE UP (ref 10.3–14.5)
SODIUM SERPL-SCNC: 136 MMOL/L — SIGNIFICANT CHANGE UP (ref 135–145)
WBC # BLD: 6.67 K/UL — SIGNIFICANT CHANGE UP (ref 3.8–10.5)
WBC # FLD AUTO: 6.67 K/UL — SIGNIFICANT CHANGE UP (ref 3.8–10.5)

## 2022-12-08 RX ADMIN — Medication 5 MILLIGRAM(S): at 22:08

## 2022-12-08 RX ADMIN — PIPERACILLIN AND TAZOBACTAM 25 GRAM(S): 4; .5 INJECTION, POWDER, LYOPHILIZED, FOR SOLUTION INTRAVENOUS at 22:34

## 2022-12-08 RX ADMIN — Medication 5 MILLIGRAM(S): at 18:02

## 2022-12-08 RX ADMIN — Medication 20 MICROGRAM(S): at 05:13

## 2022-12-08 RX ADMIN — Medication 5 MILLIGRAM(S): at 12:01

## 2022-12-08 RX ADMIN — Medication 5 MILLIGRAM(S): at 05:12

## 2022-12-08 RX ADMIN — PIPERACILLIN AND TAZOBACTAM 25 GRAM(S): 4; .5 INJECTION, POWDER, LYOPHILIZED, FOR SOLUTION INTRAVENOUS at 14:38

## 2022-12-08 RX ADMIN — BUDESONIDE AND FORMOTEROL FUMARATE DIHYDRATE 2 PUFF(S): 160; 4.5 AEROSOL RESPIRATORY (INHALATION) at 22:05

## 2022-12-08 RX ADMIN — Medication 5 MILLIGRAM(S): at 00:46

## 2022-12-08 RX ADMIN — SODIUM CHLORIDE 75 MILLILITER(S): 9 INJECTION, SOLUTION INTRAVENOUS at 22:11

## 2022-12-08 RX ADMIN — PIPERACILLIN AND TAZOBACTAM 25 GRAM(S): 4; .5 INJECTION, POWDER, LYOPHILIZED, FOR SOLUTION INTRAVENOUS at 05:13

## 2022-12-08 RX ADMIN — ENOXAPARIN SODIUM 30 MILLIGRAM(S): 100 INJECTION SUBCUTANEOUS at 11:59

## 2022-12-08 NOTE — PROGRESS NOTE ADULT - SUBJECTIVE AND OBJECTIVE BOX
Patient is a 89y old  Female who presents with a chief complaint of b/l shaking (07 Dec 2022 14:52)    Date of servie : 12-08-22 @ 13:40  INTERVAL HPI/OVERNIGHT EVENTS:  T(C): 36.8 (12-08-22 @ 05:02), Max: 36.8 (12-07-22 @ 21:20)  HR: 74 (12-08-22 @ 05:02) (73 - 85)  BP: 139/63 (12-08-22 @ 05:02) (128/80 - 141/72)  RR: 18 (12-08-22 @ 05:02) (18 - 18)  SpO2: 98% (12-08-22 @ 05:02) (98% - 99%)  Wt(kg): --  I&O's Summary    07 Dec 2022 07:01  -  08 Dec 2022 07:00  --------------------------------------------------------  IN: 0 mL / OUT: 700 mL / NET: -700 mL        LABS:                        11.1   6.67  )-----------( 276      ( 08 Dec 2022 05:30 )             34.0     12-08    136  |  103  |  14  ----------------------------<  76  3.7   |  21<L>  |  0.79    Ca    8.5      08 Dec 2022 05:30  Phos  2.9     12-08  Mg     1.80     12-08          CAPILLARY BLOOD GLUCOSE      POCT Blood Glucose.: 88 mg/dL (08 Dec 2022 07:37)  POCT Blood Glucose.: 88 mg/dL (08 Dec 2022 00:41)  POCT Blood Glucose.: 83 mg/dL (07 Dec 2022 16:46)            MEDICATIONS  (STANDING):  budesonide 160 MICROgram(s)/formoterol 4.5 MICROgram(s) Inhaler 2 Puff(s) Inhalation two times a day  enoxaparin Injectable 30 milliGRAM(s) SubCutaneous every 24 hours  hydrALAZINE Injectable 5 milliGRAM(s) IV Push every 8 hours  lactated ringers. 1000 milliLiter(s) (75 mL/Hr) IV Continuous <Continuous>  levothyroxine Injectable 20 MICROGram(s) IV Push <User Schedule>  levothyroxine Injectable 40 MICROGram(s) IV Push <User Schedule>  metoprolol tartrate Injectable 5 milliGRAM(s) IV Push every 6 hours  piperacillin/tazobactam IVPB.. 3.375 Gram(s) IV Intermittent every 8 hours    MEDICATIONS  (PRN):  albuterol/ipratropium for Nebulization 3 milliLiter(s) Nebulizer every 6 hours PRN Shortness of Breath and/or Wheezing          PHYSICAL EXAM:  GENERAL: frail  HEAD:  Atraumatic, Normocephalic  CHEST/LUNG: Clear to percussion bilaterally; No rales, rhonchi, wheezing, or rubs  HEART: Regular rate and rhythm; No murmurs, rubs, or gallops  ABDOMEN: Soft, Nontender, Nondistended; Bowel sounds present  EXTREMITIES: no edema    Care Discussed with Consultants/Other Providers [x ] YES  [ ] NO

## 2022-12-08 NOTE — PROGRESS NOTE ADULT - ASSESSMENT
89yoF hx hypothyroidism, AF, CHB s/p PPM, Parkinson disease, asthma, and recurrent UTI's admitted for sepsis    Problem/Plan - 1:  ·  Problem: Sepsis.   ·  Plan: -Pt p/w fever, tachy and leukocytosis  -UA negative, RVP negative, CXR with no consolidation. Procal 0.07  -CTA negative for PE     -Pt septic likely 2/2 aspiration PNA  - speech and swallow     Problem/Plan - 2:  ·  Problem: Elevated troponin.   ·  Plan: -Pt with elevated trop on admission 63 -> 70 -> 75. No EKG changes from last admission. Will get repeat EKG to evaluate.  -Will cont to trend until downtrending.    Problem/Plan - 3:  ·  Problem: Parkinsons.   ·  Plan: - neuro fu       prognosis guarded  dw family  dnr, dni , hospice panning 
89yoF hx hypothyroidism, AF, CHB s/p PPM, Parkinson disease, asthma, and recurrent UTI's admitted for sepsis    Problem/Plan - 1:  ·  Problem: Sepsis.   ·  Plan: -Pt p/w fever, tachy and leukocytosis  -UA negative, RVP negative, CXR with no consolidation. Procal 0.07  -CTA negative for PE   -Pt was previously evaluated for S&S in 2020 with contraindication for PO diet. Extensive conversation with family with decision for pleasure feeds  -Pt septic likely 2/2 aspiration PNA  - speech and swallow appreciated  - NPO     Problem/Plan - 2:  ·  Problem: Elevated troponin.   ·  Plan: -Pt with elevated trop on admission 63 -> 70 -> 75. No EKG changes from last admission. Will get repeat EKG to evaluate.  -Will cont to trend until downtrending.    Problem/Plan - 3:  ·  Problem: Parkinsons.   ·  Plan: - neuro fu     
89yoF hx hypothyroidism, AF, CHB s/p PPM, Parkinson disease, asthma, and recurrent UTI's admitted for sepsis    Problem/Plan - 1:  ·  Problem: Sepsis.   ·  Plan: -Pt p/w fever, tachy and leukocytosis  -UA negative, RVP negative, CXR with no consolidation. Procal 0.07  -CTA negative for PE   -Pt was previously evaluated for S&S in 2020 with contraindication for PO diet. Extensive conversation with family with decision for pleasure feeds  -Pt septic likely 2/2 aspiration PNA  - speech and swallow     Problem/Plan - 2:  ·  Problem: Elevated troponin.   ·  Plan: -Pt with elevated trop on admission 63 -> 70 -> 75. No EKG changes from last admission. Will get repeat EKG to evaluate.  -Will cont to trend until downtrending.    Problem/Plan - 3:  ·  Problem: Parkinsons.   ·  Plan: - neuro fu       prognosis guarded  palliative fu       
89yoF hx hypothyroidism, AF, CHB s/p PPM, Parkinson disease, asthma, and recurrent UTI's admitted for sepsis    Problem/Plan - 1:  ·  Problem: Sepsis.   ·  Plan: -Pt p/w fever, tachy and leukocytosis  -UA negative, RVP negative, CXR with no consolidation. Procal 0.07  -CTA negative for PE   -Pt was previously evaluated for S&S in 2020 with contraindication for PO diet. Extensive conversation with family with decision for pleasure feeds  -Pt septic likely 2/2 aspiration PNA  -Pt failed bedside bedside swallow eval. Family agree to keep pt NPO will S&S eval in the AM  -Will hold off on abx for now. Monitor fever and wbc curve   -BCx and UCx pending.    Problem/Plan - 2:  ·  Problem: Elevated troponin.   ·  Plan: -Pt with elevated trop on admission 63 -> 70 -> 75. No EKG changes from last admission. Will get repeat EKG to evaluate.  -Will cont to trend until downtrending.    Problem/Plan - 3:  ·  Problem: Parkinsons.   ·  Plan: -Will need to get official med rec in the AM  -Will need neurology consult in the AM.    Problem/Plan - 4:  ·  Problem: Hypothyroidism.   ·  Plan: -Will need official med rec in the AM.    Problem/Plan - 5:  ·  Problem: Atrial fibrillation.   ·  Plan: -Will need official med rec in the AM.    Problem/Plan - 6:  ·  Problem: Hypertension.   ·  Plan: -Will need official med rec in the AM.    Problem/Plan - 7:  ·  Problem: Prophylactic measure.   ·  Plan: DVT ppx: Lovenox  Code status: Full Code.
89yoF hx hypothyroidism, AF, CHB s/p PPM, Parkinson disease, asthma, and recurrent UTI's p/w shaking, fever    Julio Pritchard  Attending Physician   Division of Infectious Disease  Office #[phone]  Available on [employer] Teams also  After 5pm/weekend or no response, call #[phone]
89yoF hx hypothyroidism, AF, CHB s/p PPM, Parkinson disease, asthma, and recurrent UTI's p/w shaking, fever    Julio Pritchard  Attending Physician   Division of Infectious Disease  Office #[phone]  Available on [employer] Teams also  After 5pm/weekend or no response, call #[phone]

## 2022-12-08 NOTE — PROVIDER CONTACT NOTE (OTHER) - SITUATION
pt  have blood in the urine from the indwelling cath  . Notified ACP, she stated that will continue monitor
Patient febrile 100.4 rectal and remains hypertensive

## 2022-12-08 NOTE — PROGRESS NOTE ADULT - NUTRITIONAL ASSESSMENT
This patient has been assessed with a concern for Malnutrition and has been determined to have a diagnosis/diagnoses of Severe protein-calorie malnutrition.    This patient is being managed with:   Diet Pureed-  Entered: Dec  8 2022  9:57AM

## 2022-12-08 NOTE — CHART NOTE - NSCHARTNOTEFT_GEN_A_CORE
Medical attending spoke with family and filled out MOLST form with family. Patient to be DNR/DNI and home hospice referral to be made. CM aware. Patient also started on pleasure feeds and comfort measures started. As per attending, keep on IV abx for now.

## 2022-12-09 ENCOUNTER — TRANSCRIPTION ENCOUNTER (OUTPATIENT)
Age: 87
End: 2022-12-09

## 2022-12-09 VITALS
HEART RATE: 96 BPM | RESPIRATION RATE: 18 BRPM | DIASTOLIC BLOOD PRESSURE: 65 MMHG | OXYGEN SATURATION: 97 % | SYSTOLIC BLOOD PRESSURE: 127 MMHG

## 2022-12-09 LAB
CULTURE RESULTS: SIGNIFICANT CHANGE UP
CULTURE RESULTS: SIGNIFICANT CHANGE UP
SPECIMEN SOURCE: SIGNIFICANT CHANGE UP
SPECIMEN SOURCE: SIGNIFICANT CHANGE UP

## 2022-12-09 RX ORDER — DOCUSATE SODIUM 100 MG
1 CAPSULE ORAL
Qty: 0 | Refills: 0 | DISCHARGE

## 2022-12-09 RX ADMIN — Medication 5 MILLIGRAM(S): at 06:09

## 2022-12-09 RX ADMIN — PIPERACILLIN AND TAZOBACTAM 25 GRAM(S): 4; .5 INJECTION, POWDER, LYOPHILIZED, FOR SOLUTION INTRAVENOUS at 06:30

## 2022-12-09 RX ADMIN — Medication 5 MILLIGRAM(S): at 11:50

## 2022-12-09 RX ADMIN — Medication 5 MILLIGRAM(S): at 06:12

## 2022-12-09 RX ADMIN — Medication 40 MICROGRAM(S): at 07:53

## 2022-12-09 RX ADMIN — ENOXAPARIN SODIUM 30 MILLIGRAM(S): 100 INJECTION SUBCUTANEOUS at 11:50

## 2022-12-09 RX ADMIN — Medication 5 MILLIGRAM(S): at 00:15

## 2022-12-09 NOTE — DISCHARGE NOTE PROVIDER - NSDCCPCAREPLAN_GEN_ALL_CORE_FT
PRINCIPAL DISCHARGE DIAGNOSIS  Diagnosis: Sepsis  Assessment and Plan of Treatment: You were admitted for fever and high white blood cell count. Viral panel/COVID negative, Urinalysis negative and Chest x ray negative. CTA scan of your chest was negative for blood clots. You were given IV antibiotics and Infectious disease team was consulted. Blood cultures and Urine cultures were negative. Infectious disease team believed sepsis due to aspiration pneumonia and IV antibiotics continued.   Palliative care was consulted. Family made you DNR/DNI and comfort measures and home hospice referral made.      SECONDARY DISCHARGE DIAGNOSES  Diagnosis: Elevated troponin  Assessment and Plan of Treatment: Cardiac enzymes were elevated, but no EKG changes noted. Cardiology was called, but no intervention as most likely stress induced.

## 2022-12-09 NOTE — DISCHARGE NOTE PROVIDER - DETAILS OF MALNUTRITION DIAGNOSIS/DIAGNOSES
This patient has been assessed with a concern for Malnutrition and was treated during this hospitalization for the following Nutrition diagnosis/diagnoses:     -  12/06/2022: Severe protein-calorie malnutrition

## 2022-12-09 NOTE — DISCHARGE NOTE PROVIDER - HOSPITAL COURSE
89yoF hx hypothyroidism, AF, CHB s/p PPM, Parkinson disease, asthma, and recurrent UTI's p/w shaking. Per pt grandsons (HCP) pt has been having increase shaking for the past few months and has rigidity in her right and keeps it in a fist. it has gotten worse in the past 2 days and they were told to go to the ED. States that she has low grade fever of  at home. Denies any coughing with food. Per grandson, pt had similar symptoms in 2020 when they found she had a UTI.  No nausea or vomiting. Pt at baseline is full nonverbal and bedbound per son. In the ED, pt was febrile to 100.7, , /99 and saturating well on RA.      Patient was admitted for fever and leukocytosis. RVP/COVID negative, UA negative and CXR negative. CTA chest was negative for PE. Patient was given Zosyn and ID was consulted. Bcx and Ucx negative. ID believed sepsis due to aspiration PNA and zosyn continued. Troponins were elevated, but no EKG changes. Cardiology was called, but no intervention as most likely stress induced. Patient had RUE swelling and contracted hand. [facility] doppler was negative. Palliative care was consulted. Family made patient DNR/DNI and comfort measures and home hospice referral made.     On ___ this case was reviewed with  ____, the patient is medically stable and optimized for discharge to home hospice. All medications were reviewed and prescriptions were sent to mutually agreed upon pharmacy.              89yoF hx hypothyroidism, AF, CHB s/p PPM, Parkinson disease, asthma, and recurrent UTI's p/w shaking. Per pt grandsons (HCP) pt has been having increase shaking for the past few months and has rigidity in her right and keeps it in a fist. it has gotten worse in the past 2 days and they were told to go to the ED. States that she has low grade fever of  at home. Denies any coughing with food. Per grandson, pt had similar symptoms in 2020 when they found she had a UTI.  No nausea or vomiting. Pt at baseline is full nonverbal and bedbound per son. In the ED, pt was febrile to 100.7, , /99 and saturating well on RA.      Patient was admitted for fever and leukocytosis. RVP/COVID negative, UA negative and CXR negative. CTA chest was negative for PE. Patient was given Zosyn and ID was consulted. Bcx and Ucx negative. ID believed sepsis due to aspiration PNA and zosyn continued. Troponins were elevated, but no EKG changes. Cardiology was called, but no intervention as most likely stress induced. Patient had RUE swelling and contracted hand. VA doppler was negative. Palliative care was consulted. Family made patient DNR/DNI and comfort measures and home hospice referral made.     On 12/9/2022 this case was reviewed with Dr. Xavier, the patient is medically stable and optimized for discharge to home hospice. All medications were reviewed and prescriptions were sent to mutually agreed upon pharmacy.

## 2022-12-09 NOTE — DISCHARGE NOTE NURSING/CASE MANAGEMENT/SOCIAL WORK - NSSCNAMETXT_GEN_ALL_CORE
[facility] [phone].  CDPap aide through [facility], [facility] [phone]. Agency [facility] [phone].

## 2022-12-09 NOTE — DISCHARGE NOTE NURSING/CASE MANAGEMENT/SOCIAL WORK - PATIENT PORTAL LINK FT
You can access the FollowMyHealth Patient Portal offered by [facility] by registering at the following website: [email or web address]. By joining [facility] FollowMyHealth portal, you will also be able to view your health information using other applications (apps) compatible with our system.

## 2022-12-09 NOTE — CHART NOTE - NSCHARTNOTEFT_GEN_A_CORE
Source: Patient [ ]    Family [ x] Daughter by bedside    other [ x] Chart review    Current Diet : Diet, Pureed:   Supplement Feeding Modality:  Oral  Ensure Enlive Cans or Servings Per Day:  1       Frequency:  Three Times a day (12-09-22 @ 09:21) [Active]    PO intake:  < 50% [x ]   Weight (kg): 44.8 (06 Dec 2022 08:15), no new weight to assess    Nutrition Note:   89yoF hx hypothyroidism, AF, CHB s/p PPM, Parkinson disease, asthma, and recurrent UTI's admitted for sepsis, per chart.  Patient appears lethargic during visit. Patient's daughter reports patient is tolerating pureed diet consistency, consuming ~50% of Ensure supplement, and likes juice. Food preferences obtained. Daughter denies any GI distress at this time. As per chart review, patient's family is planning for home hospice. As per event note dated 12/8/2022, patient started on pleasure feeds and comfort care. As per event note dated 12/7/2022, patient is not a candidate for peg placement due to her status. Patient has 1+edema to left arm, right arm, unstageable pressure injury to right palm., per flow sheet. Recommend to add multivitamin and vitamin c, to promote wound healing.     __________________ Pertinent Medications__________________   MEDICATIONS  (STANDING):  budesonide 160 MICROgram(s)/formoterol 4.5 MICROgram(s) Inhaler 2 Puff(s) Inhalation two times a day  enoxaparin Injectable 30 milliGRAM(s) SubCutaneous every 24 hours  hydrALAZINE Injectable 5 milliGRAM(s) IV Push every 8 hours  lactated ringers. 1000 milliLiter(s) (75 mL/Hr) IV Continuous <Continuous>  levothyroxine Injectable 20 MICROGram(s) IV Push <User Schedule>  levothyroxine Injectable 40 MICROGram(s) IV Push <User Schedule>  metoprolol tartrate Injectable 5 milliGRAM(s) IV Push every 6 hours  piperacillin/tazobactam IVPB.. 3.375 Gram(s) IV Intermittent every 8 hours    MEDICATIONS  (PRN):  albuterol/ipratropium for Nebulization 3 milliLiter(s) Nebulizer every 6 hours PRN Shortness of Breath and/or Wheezing      __________________ Pertinent Labs__________________   12-08 Na136 mmol/L Glu 76 mg/dL K+ 3.7 mmol/L Cr  0.79 mg/dL BUN 14 mg/dL 12-08 Phos 2.9 mg/dL 12-06 Alb 2.7 g/dL<L>    Estimated Needs:   [x ] no change since previous assessment    Previous Nutrition Diagnosis:    [ x] Malnutrition     Nutrition Diagnosis is [ x] ongoing      New Nutrition Diagnosis: [x ] not applicable    Interventions:     Recommend  [ x] Continue with current diet: pureed and supplement: Ensure Enlive/Plus High Protein 8oz 3x/day(1050kcal,60gm protein).   [x ] Recommend adding multivitamin, vitamin c to promote wound healing.   [x ] Encourage PO intake and honor food preferences as able.   [x ] Monitor and Replete electrolytes.    Monitoring and Evaluation:   [x ] PO intake [ x] Tolerance to diet prescription [x ] weights [ x] follow up per protocol  [ x] other: bowel movement, labs, skin integrity.

## 2022-12-09 NOTE — DISCHARGE NOTE PROVIDER - NSDCMRMEDTOKEN_GEN_ALL_CORE_FT
albuterol 2.5 mg/3 mL (0.083%) inhalation solution: 3 milliliter(s) inhaled every 4 to 6 hours, As Needed  docusate sodium 100 mg oral capsule: 1 cap(s) orally 2 times a day, As Needed  levothyroxine 25 mcg (0.025 mg) oral tablet: 1 tab(s) orally every other day  Alternating with 50mcg tablet  levothyroxine 50 mcg (0.05 mg) oral tablet: 1 tab(s) orally every other day  Alternating with 25mcg tablet  Metoprolol Succinate ER 50 mg oral tablet, extended release: 1 tab(s) orally once a day  Multiple Vitamins oral tablet: 1 tab(s) orally once a day  ProAir HFA 90 mcg/inh inhalation aerosol: 2 puff(s) inhaled 4 times a day, As Needed  Vitamin D3 1000 intl units (25 mcg) oral tablet: 1 tab(s) orally once a day   albuterol 2.5 mg/3 mL (0.083%) inhalation solution: 3 milliliter(s) inhaled every 4 to 6 hours, As Needed  levothyroxine 25 mcg (0.025 mg) oral tablet: 1 tab(s) orally every other day  Alternating with 50mcg tablet  levothyroxine 50 mcg (0.05 mg) oral tablet: 1 tab(s) orally every other day  Alternating with 25mcg tablet  Metoprolol Succinate ER 50 mg oral tablet, extended release: 1 tab(s) orally once a day  Multiple Vitamins oral tablet: 1 tab(s) orally once a day  ProAir HFA 90 mcg/inh inhalation aerosol: 2 puff(s) inhaled 4 times a day, As Needed  Vitamin D3 1000 intl units (25 mcg) oral tablet: 1 tab(s) orally once a day

## 2023-01-01 ENCOUNTER — APPOINTMENT (OUTPATIENT)
Dept: ELECTROPHYSIOLOGY | Facility: CLINIC | Age: 88
End: 2023-01-01
Payer: MEDICAID

## 2023-01-01 ENCOUNTER — APPOINTMENT (OUTPATIENT)
Dept: ELECTROPHYSIOLOGY | Facility: CLINIC | Age: 88
End: 2023-01-01

## 2023-01-01 ENCOUNTER — NON-APPOINTMENT (OUTPATIENT)
Age: 88
End: 2023-01-01

## 2023-01-01 VITALS — DIASTOLIC BLOOD PRESSURE: 93 MMHG | SYSTOLIC BLOOD PRESSURE: 168 MMHG | HEART RATE: 70 BPM | RESPIRATION RATE: 14 BRPM

## 2023-01-01 DIAGNOSIS — I44.2 ATRIOVENTRICULAR BLOCK, COMPLETE: ICD-10-CM

## 2023-01-01 PROCEDURE — 93280 PM DEVICE PROGR EVAL DUAL: CPT

## 2023-01-01 PROCEDURE — 93294 REM INTERROG EVL PM/LDLS PM: CPT

## 2023-01-01 PROCEDURE — 93296 REM INTERROG EVL PM/IDS: CPT

## 2023-01-01 RX ORDER — LORAZEPAM 0.5 MG/1
0.5 TABLET ORAL
Refills: 0 | Status: ACTIVE | COMMUNITY

## 2023-03-28 NOTE — DISCHARGE NOTE ADULT - DISCHARGE DATE
HGB 11-> 8 3 -> 8 1 -> 7 7 -> 7 3 -> 7 8 -> 7 3 > 7 7  Likely acute blood loss anemia from surgery  · B12 322  · Folate 13 6  · Iron panel suspicious for DANIELLA   · Stop iron supplementation at this time   · Venofer 200 mg IV daily for 3 doses  · Unable to transfuse 1 unit of PRBCs as her blood type is O- and there is a critical shortage; blood bank recommends transfusion for hemoglobin is less than 6  · Added B12 supplement   · Monitor HGB and transfuse if less than 6 or if patient becomes symptomatic 19-Feb-2018

## 2023-09-07 NOTE — DISCHARGE NOTE ADULT - HOSPITAL COURSE
HPI: Mr. Luan Mitchell is a 80 year old year old male presenting today for evaluation of chief complaint(s): Follow Up (Routine prostate check up. 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 Murphy in 1998),  more recent abnormal LEAH s/p prostate MRI on 7/6/18 showing a PIRADS 3 lesion (intermediate probability).  For this he was subsequently seen by Dr. Gentile, last on 6/29/20 with a PSA of 1.91ug/L (3.82ug/L adjusted for finasteride).  At that time it was recommended that he continue on finasteride with continued observation of PSAs. Has had 4 prostate biopsies in the distant past, all negative.       6/1/21 - LEAH showed asymmetry R>L but no nodules.  PSA was stable at 1.95ug/L (3.90 adjusted for finasteride).    5/26/22 - Patient very concerned about PSA increase to 2.29ug/L (4.6 adjusted for finasteride).  We reviewed PCPT nomogram and continued finasteride.   8/25/22 - PSA of 2.09ug/L. Finasteride continued  3/2/23 - PSA 2.28, stable from previous.  Having some urinary frequency.  We continued finasteride    Today he returns for a PSA update. See below - PSA is normal.    May be having a little more frequency over the past 6-12 months, but he does drink a lot of water.  Notes there is a positional effect (when he moves after staying still for a long while).  Also an emotional effect - sometimes can hold his urine for a long time without difficulty.    Also feels he is emptying his bladder fine.    Health is good.  Nothing new.    Pemphigus is stable.  Has torso lesions that are mild, transient and well-controlled.  Continues to have an itchy scalp.    Has annual physical next spring and plans to discuss routine 10 year colonoscopy (last one was normal).      REVIEW OF DIAGNOSTICS:  9/5/23 - PSA 1.90  2/27/23 - PSA 2.28ug/L  8/24/22 - PSA 2.09ug/L  3/14/22 - PSA 2.29ug/L  3/16/21 - PSA  "1.95ug/L  6/12/20 - PSA 1.91  6/24/19 - PSA 1.86  9/25/18 - PSA 2.05    Current Outpatient Medications   Medication Sig Dispense Refill    ARTIFICIAL TEAR SOLUTION OP Apply to eye daily       atorvastatin (LIPITOR) 20 MG tablet Take 1 tablet (20 mg) by mouth daily 90 tablet 4    clobetasol (TEMOVATE) 0.05 % external ointment Apply topically 2 times daily For more bothersome lesions 120 g 1    finasteride (PROSCAR) 5 MG tablet Take 1 tablet (5 mg) by mouth daily 90 tablet 3    hydrochlorothiazide (HYDRODIURIL) 25 MG tablet Take 1 tablet (25 mg) by mouth daily 90 tablet 4    hydrocortisone butyrate (LOCOID) 0.1 % SOLN Apply sparingly to affected area(s) of beard twice daily 180 mL 3    ketoconazole (NIZORAL) 2 % cream Apply twice daily to the nose as needed for white/scaling. 60 g 2    ketoconazole (NIZORAL) 2 % external shampoo Three times per week in the shower: Lather into scalp, leave in place for 3-5 minutes, then rinse. 360 mL 12    Multiple Vitamin (MULTIVITAMIN) per tablet Take 1 tablet by mouth daily 1 tab daily      mycophenolate (GENERIC EQUIVALENT) 500 MG tablet Take 1 tablet (500 mg) by mouth every morning (before breakfast) 90 tablet 4    salicyclic acid-sulfur (SEBULEX) 2-2 % external shampoo Apply topically 3 times daily as needed for itching In the shower. 200 g 11    tacrolimus (PROTOPIC) 0.1 % external ointment Apply topically to affected areas as instructed. 300 g 3    triamcinolone (KENALOG) 0.1 % external ointment Apply topically 2 times daily For lesions that are more mild 453.6 g 1       ALLERGIES: Patient has no known allergies.      REVIEW OF SYSTEMS:  As above in HPI     GENERAL PHYSICAL EXAM:   Vitals: BP (!) 144/85 (BP Location: Right arm, Patient Position: Sitting, Cuff Size: Adult Small)   Pulse 51   Resp 17   Ht 1.753 m (5' 9\")   Wt 79.4 kg (175 lb)   SpO2 96%   BMI 25.84 kg/m    There is no height or weight on file to calculate BMI.    GENERAL: Well groomed, well developed, well " nourished male in NAD.  NEURO: Alert and oriented x 3.  PSYCH: Normal mood and affect, pleasant and agreeable during interview and exam.    LEAH:      normal rectal tone, small soft amount of stool in the rectal vault.      medium sized prostate, without tenderness, nodules.  + prominent and slightly more firm on right side but no distinct nodules     RADIOLOGY: The following tests were reviewed:   MRI PROSTATE:  7/6/2018 2:48 PM  IMPRESSION:   1. Based on the most suspicious abnormality, this exam is  characterized as PIRADS 3 - Intermediate probability.  The presence of  clinically significant cancer is equivocal.  The most suspicious  abnormality is a ill-defined 1.3 cm region in the left mid gland  transitional zone from 2-3 o'clock, and there is no evidence of  extracapsular extension.  2. No evidence of extraprostatic malignancy. No suspicious adenopathy  or evidence of pelvic metastases.    LABS: The last test results for Mr. Luan Mitchell were reviewed:  PSA -   Lab Results   Component Value Date    PSA 1.90 09/05/2023    PSA 2.28 02/27/2023    PSA 2.09 08/24/2022    PSA 2.29 03/14/2022    PSA 1.95 03/16/2021    PSA 1.91 06/12/2020    PSA 1.86 06/24/2019    PSA 1.82 03/14/2019    PSA 2.05 09/25/2018    PSA 2.10 06/21/2018    PSA 2.42 02/05/2018    PSA 2.09 01/10/2017    PSA 1.96 02/14/2011    PSA 2.47 02/25/2010     BMP -   Recent Labs   Lab Test 06/08/23  0839 03/21/23  1303 12/22/22  1050    140 141   POTASSIUM 4.1 4.4 3.9   CHLORIDE 104 101 103   CO2 29 30* 27   BUN 23.0 15.8 27.7*   CR 1.09 1.00 1.04   * 113* 110*   JOSHUA 10.0 10.0 9.9       CBC -   Recent Labs   Lab Test 06/08/23  0839 12/22/22  1050 06/30/22  1148   WBC 7.1 7.6 7.4   HGB 15.0 15.0 15.5    161 159       ASSESSMENT:   1) BPH on finasteride with stable PSA    PLAN:   - Finasteride refilled today  - Return in 6 months for visit with another PSA first.  Patient would like to continue rechecks at the interval of every 6  months.      VISIT DURATION: 17 minutes (4:00 - 4:12 + 5 minutes documentation on DOS)    Carolynn Carroll PA-C  Department of Urologic Surgery     87F with history of Asthma, HTN, Hypothyroidism, and s/p PPM (2/2 3rd degree block) presented to the hospital with complaints of a severe cough and shortness of breath since night PTA. As per the daughter, the patient started to initially have a severe cough with production of some whitish sputum, said that the patient then started to have shortness of breath that was minimally improved with her inhalers. The patient denies any nasal congestion, sore throat, or myalgias. Said that she is having some n/v (threw up her dinner last night) but otherwise denies any other GI complaints. Pts daughter does state that her appetite has been poor since her coughing started. The daughter said that she had noted the patient's PPM would beep when ever she had a coughing fit but that has since resolved. No f/c at home, no known sick contacts and no recent travel. No other complaints.     In the ED, her vitals were T 98.4, P 82, /75, R 16, O2 sat 100% RA. Lab work was significant for mild hyponatremia and RVP positive for influenza AH3. CXR clear lungs. She was given NS 500cc x1, duonebs x2, and prednisone 50mg PO x1. She was  seen by EP in the ED and her PPM was interrogated and was functioning normally.     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 Sodium started to improve. Patient was continued on prednisone 40 daily as well as home inhalers. Cough and SOB improved and patient did not have any wheezing or crackles on exam. Pt remained afebrile and HDS throughout admission and did not require supp O2. 87F with history of Asthma, HTN, Hypothyroidism, and s/p PPM (2/2 3rd degree block) presented to the hospital with complaints of a severe cough and shortness of breath since night PTA. As per the daughter, the patient started to initially have a severe cough with production of some whitish sputum, said that the patient then started to have shortness of breath that was minimally improved with her inhalers. The patient denies any nasal congestion, sore throat, or myalgias. Said that she is having some n/v (threw up her dinner last night) but otherwise denies any other GI complaints. Pts daughter does state that her appetite has been poor since her coughing started. The daughter said that she had noted the patient's PPM would beep when ever she had a coughing fit but that has since resolved. No f/c at home, no known sick contacts and no recent travel. No other complaints.     In the ED, her vitals were T 98.4, P 82, /75, R 16, O2 sat 100% RA. Lab work was significant for mild hyponatremia and RVP positive for influenza AH3. CXR clear lungs. She was given NS 500cc x1, duonebs x2, and prednisone 50mg PO x1. She was seen by EP in the ED and her PPM was interrogated and was functioning normally.     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 Sodium started to improve. Patient was continued on prednisone 40 daily as well as home inhalers. Cough and SOB improved. Pt remained afebrile and HDS throughout admission and did not require supp O2. Patient was evaluated by PT and recommended for home PT. 87F with history of Asthma, HTN, Hypothyroidism, and s/p PPM (2/2 3rd degree block) presented to the hospital with complaints of a severe cough and shortness of breath since night PTA. As per the daughter, the patient started to initially have a severe cough with production of some whitish sputum, said that the patient then started to have shortness of breath that was minimally improved with her inhalers. The patient denies any nasal congestion, sore throat, or myalgias. Said that she is having some n/v (threw up her dinner last night) but otherwise denies any other GI complaints. Pts daughter does state that her appetite has been poor since her coughing started. The daughter said that she had noted the patient's PPM would beep when ever she had a coughing fit but that has since resolved. No f/c at home, no known sick contacts and no recent travel. No other complaints.     In the ED, her vitals were T 98.4, P 82, /75, R 16, O2 sat 100% RA. Lab work was significant for mild hyponatremia and RVP positive for influenza AH3. CXR clear lungs. She was given NS 500cc x1, duonebs x2, and prednisone 50mg PO x1. She was seen by EP in the ED and her PPM was interrogated and was functioning normally.     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 Sodium started to improve. Patient was continued on prednisone 40 daily as well as home inhalers. Cough and SOB improved. Pt remained afebrile and HDS throughout admission and did not require supp O2. Patient was evaluated by PT and recommended for home PT however daughter states she walks with her mother and refused home PT set-up.

## 2023-10-23 NOTE — PROGRESS NOTE ADULT - PROBLEM/PLAN-7
Patient informed, verbally understood.
DISPLAY PLAN FREE TEXT

## 2023-11-01 NOTE — PATIENT PROFILE ADULT - NSPROMEDDEVPACEMAKER_GEN_A_NUR
Detail Level: Simple Render In Strict Bullet Format?: No Initiate Treatment: clindamycin 1 % lotion - Apply to face twice a day\\n\\ndoxycycline hyclate 100 mg - 1 PO BID with food Initiate Treatment: triamcinolone acetonide 0.1 % topical cream  - Apply twice daily to affected areas on body up to 2 weeks unknown

## 2024-03-25 NOTE — ED PROVIDER NOTE - RE-EVALUATION DATE/TIME:
[de-identified] : No scars palpable umbilical hernia and fullness in the supraumbilical area [de-identified] : Patient appears well her abdomen is soft she does have a palpable umbilical hernia but the area that she is complaining about is more cephalad 04-Dec-2022 23:07

## 2024-04-18 ENCOUNTER — APPOINTMENT (OUTPATIENT)
Dept: ELECTROPHYSIOLOGY | Facility: CLINIC | Age: 89
End: 2024-04-18

## 2024-05-09 NOTE — ED PROVIDER NOTE - CHIEF COMPLAINT
The Delivery OB Provider certifies that vaginal examination and/or abdominal examination after the delivery was done and no foreign body was found. The patient is a 87y Female complaining of cough.

## 2024-10-22 NOTE — PROGRESS NOTE ADULT - PROBLEM SELECTOR PLAN 1
- Given acute onset of her symptoms, would start her on tamiflu and observe response (tamiflu 30mg BID dosed for creatinine clearance of 37)  - c/w supportive care as needed normal...

## 2024-12-31 NOTE — PHYSICAL THERAPY INITIAL EVALUATION ADULT - TRANSFER SKILLS, REHAB EVAL
needs device and assist [Dysphagia] : no dysphagia [Loss of Hearing] : no loss of hearing [Nosebleeds] : no nosebleeds [Hoarseness] : no hoarseness [Odynophagia] : no odynophagia [Mucosal Pain] : no mucosal pain [Negative] : Allergic/Immunologic [FreeTextEntry4] : Lump in throat
